# Patient Record
Sex: FEMALE | NOT HISPANIC OR LATINO | Employment: OTHER | URBAN - METROPOLITAN AREA
[De-identification: names, ages, dates, MRNs, and addresses within clinical notes are randomized per-mention and may not be internally consistent; named-entity substitution may affect disease eponyms.]

---

## 2017-01-17 ENCOUNTER — OFFICE VISIT - HEALTHEAST (OUTPATIENT)
Dept: RHEUMATOLOGY | Facility: CLINIC | Age: 62
End: 2017-01-17

## 2017-01-17 DIAGNOSIS — Z79.899 HIGH RISK MEDICATION USE: ICD-10-CM

## 2017-01-17 DIAGNOSIS — M05.79 SEROPOSITIVE RHEUMATOID ARTHRITIS OF MULTIPLE SITES (H): ICD-10-CM

## 2017-04-04 ENCOUNTER — HOSPITAL ENCOUNTER (OUTPATIENT)
Dept: MAMMOGRAPHY | Facility: CLINIC | Age: 62
Discharge: HOME | End: 2017-04-04
Attending: FAMILY MEDICINE

## 2017-04-04 DIAGNOSIS — Z12.31 VISIT FOR SCREENING MAMMOGRAM: ICD-10-CM

## 2017-04-07 ENCOUNTER — COMMUNICATION - HEALTHEAST (OUTPATIENT)
Dept: FAMILY MEDICINE | Facility: CLINIC | Age: 62
End: 2017-04-07

## 2017-04-07 ENCOUNTER — HOSPITAL ENCOUNTER (OUTPATIENT)
Dept: MAMMOGRAPHY | Facility: CLINIC | Age: 62
Discharge: HOME | End: 2017-04-07
Attending: FAMILY MEDICINE

## 2017-04-07 ENCOUNTER — HOSPITAL ENCOUNTER (OUTPATIENT)
Dept: ULTRASOUND IMAGING | Facility: CLINIC | Age: 62
Discharge: HOME | End: 2017-04-07
Attending: FAMILY MEDICINE

## 2017-04-07 DIAGNOSIS — N64.89 BREAST ASYMMETRY: ICD-10-CM

## 2017-07-19 ENCOUNTER — COMMUNICATION - HEALTHEAST (OUTPATIENT)
Dept: ADMINISTRATIVE | Facility: CLINIC | Age: 62
End: 2017-07-19

## 2017-07-19 DIAGNOSIS — M05.79 SEROPOSITIVE RHEUMATOID ARTHRITIS OF MULTIPLE SITES (H): ICD-10-CM

## 2017-07-20 ENCOUNTER — COMMUNICATION - HEALTHEAST (OUTPATIENT)
Dept: LAB | Facility: CLINIC | Age: 62
End: 2017-07-20

## 2017-07-20 ENCOUNTER — COMMUNICATION - HEALTHEAST (OUTPATIENT)
Dept: SCHEDULING | Facility: CLINIC | Age: 62
End: 2017-07-20

## 2017-07-21 ENCOUNTER — AMBULATORY - HEALTHEAST (OUTPATIENT)
Dept: LAB | Facility: CLINIC | Age: 62
End: 2017-07-21

## 2017-07-21 DIAGNOSIS — M05.79 SEROPOSITIVE RHEUMATOID ARTHRITIS OF MULTIPLE SITES (H): ICD-10-CM

## 2017-07-21 LAB
ALT SERPL W P-5'-P-CCNC: 16 U/L (ref 0–45)
CREAT SERPL-MCNC: 0.83 MG/DL (ref 0.6–1.1)
GFR SERPL CREATININE-BSD FRML MDRD: >60 ML/MIN/1.73M2

## 2017-07-26 ENCOUNTER — OFFICE VISIT - HEALTHEAST (OUTPATIENT)
Dept: RHEUMATOLOGY | Facility: CLINIC | Age: 62
End: 2017-07-26

## 2017-07-26 DIAGNOSIS — Z79.899 HIGH RISK MEDICATION USE: ICD-10-CM

## 2017-07-26 DIAGNOSIS — M05.79 SEROPOSITIVE RHEUMATOID ARTHRITIS OF MULTIPLE SITES (H): ICD-10-CM

## 2017-07-26 DIAGNOSIS — R76.8 CYCLIC CITRULLINATED PEPTIDE (CCP) ANTIBODY POSITIVE: ICD-10-CM

## 2017-07-26 ASSESSMENT — MIFFLIN-ST. JEOR: SCORE: 1416.35

## 2017-09-06 ENCOUNTER — COMMUNICATION - HEALTHEAST (OUTPATIENT)
Dept: RHEUMATOLOGY | Facility: CLINIC | Age: 62
End: 2017-09-06

## 2017-09-06 DIAGNOSIS — M05.79 SEROPOSITIVE RHEUMATOID ARTHRITIS OF MULTIPLE SITES (H): ICD-10-CM

## 2017-09-29 ENCOUNTER — RECORDS - HEALTHEAST (OUTPATIENT)
Dept: ADMINISTRATIVE | Facility: OTHER | Age: 62
End: 2017-09-29

## 2017-09-29 ENCOUNTER — TELEPHONE (OUTPATIENT)
Dept: TRANSPLANT | Facility: CLINIC | Age: 62
End: 2017-09-29

## 2017-09-29 NOTE — TELEPHONE ENCOUNTER
"[facility] B[facility]ZE    [id number]     LIVING KIDNEY DONOR EVALUATION  Donor First Name Elza Donor MRN Not in Epic  Donor Last Name Jam Completed 2017 12:02 AM   1955 Record ID [id number]  BREEZE Screen PASSED      Intended Recipient  Recipient First Name Basia Recipient MRN    Recipient Last Name Gillian Relationship Member of the same community  Recipient    Recipient Diagnosis    Recipient's ABO        Donor Information  Age 62 Gender Female  Height 5' 7'' Race Black or African American  Weight 177 Ethnicity Not /  BMI 27.7 Preferred Language English       Required No      Blood Type Unknown  Demographics  Home Address [address] [place] [address] # [phone]  [facility] Type [facility] #    [facility] Code [address] Type    Country [place] Preferred Contact day Mon  Email [email or web address] Preferred Contact time 09:00 AM-11:00 AM  Donor's Medical Information  Medical History Bile Duct Obstruction NOS   History of    History of pregnancy   Joint Pain/Arthritis NOS   Menopause   Perimenopausal   Postmenopausal   Rheumatoid Arthritis   Uterine Fibroids Medications \"flintstones complete children's multivitamin supplement\"   Calcium and Vitamin D   Hydroxychloroquine   Premarin  Surgical History Hysterectomy   Hysterectomy and Oophorectomy   Myomectomy, NOS Allergies Penicillin : Rash  Social History EtOH: Rare (1-2 drinks/month)   Illicit Drug Use: Denies   Tobacco: Remote; Quit ; (1 ppd x 20 years) Self-Reported Functional Status \"I am able to participate in moderate recreational activities like golf, double tennis, dancing, throwing a baseball or football\"  Family Medical History Cancer (Father, Mother)   Diabetes (Sibling, Mother, Aunt or Uncle)   Heart Disease (Sibling)   Hypertension (Father)   Kidney Disease (denies)   Kidney Stones (denies) Exercise Frequency Exercise (3 X per week)  Review of Organ Systems  Review of " Systems Airway or Lungs: No   Blood Disorder: No   Cancer: No   Diabetes,Thyroid,Adrenal,Endocrine Disorder: No   Digestive or Liver: Yes   Female Health: Yes   Heart or Circulatory System: No   Immune Diseases: Yes   Kidneys and Bladder: No   Muscles,Bones,Joints: Yes   Neuro: No   Psych: No  Donor's Social Information  Marital Status  Living Accommodation Owns own home/apartment  Level of Education Graduate or professional degree complete Living Arrangement Alone  Employment Status Full Time Concerns: health and life insurance No  Employer [employer] Concerns: job security and lost income No  Occupation        Medical Insurance Status Has medical insurance      High Risk Behavior  High Risk Behaviors Blood transfusion < 12 months. (NO)   Commercial sex < 12 months. (NO)   Illicit IV drug use < 5yrs. (NO)   Other high risk sexual contact < 12 months. (NO)  Reason for Donation  Referral From Other Donor Reason for Donation I'm a designated donor upon my death. I feel if someone is in need and I can gel, that I should.  Permission to Disclose Inquiry Yes Patient Comments    Donor Motivation Level Ready to start evaluation with reservations      PCP Contact  PCP Name Dr. Resendiz  [facility]  PCP Phone [phone]  Emergency Contact  First Name Yobani First Name Jaylan  Last Name Jam Last Name Jam  Phone # [phone] Phone # [phone]  Relationship Child Relationship Child  Office Use  Reviewed By Danisha  Reviewed 9/26/2017 11:04 AM  Admin Folder Accept  Comments 9/26: Passed eval. Not in Epic. - Danisha  Lost for Followup Not Checked  Extended Comments    BREEZE ID [email or web address]:[id number] survey status completed  Open Activities

## 2017-09-29 NOTE — TELEPHONE ENCOUNTER
"Unknown abo. Interested in PEP. 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  "

## 2017-12-06 ENCOUNTER — COMMUNICATION - HEALTHEAST (OUTPATIENT)
Dept: RHEUMATOLOGY | Facility: CLINIC | Age: 62
End: 2017-12-06

## 2017-12-06 DIAGNOSIS — M05.79 SEROPOSITIVE RHEUMATOID ARTHRITIS OF MULTIPLE SITES (H): ICD-10-CM

## 2018-01-19 ENCOUNTER — COMMUNICATION - HEALTHEAST (OUTPATIENT)
Dept: ADMINISTRATIVE | Facility: CLINIC | Age: 63
End: 2018-01-19

## 2018-01-23 ENCOUNTER — OFFICE VISIT - HEALTHEAST (OUTPATIENT)
Dept: RHEUMATOLOGY | Facility: CLINIC | Age: 63
End: 2018-01-23

## 2018-01-23 DIAGNOSIS — Z79.899 HIGH RISK MEDICATION USE: ICD-10-CM

## 2018-01-23 DIAGNOSIS — M05.79 SEROPOSITIVE RHEUMATOID ARTHRITIS OF MULTIPLE SITES (H): ICD-10-CM

## 2018-01-23 DIAGNOSIS — R76.8 CYCLIC CITRULLINATED PEPTIDE (CCP) ANTIBODY POSITIVE: ICD-10-CM

## 2018-01-23 LAB
ALBUMIN SERPL-MCNC: 3.8 G/DL (ref 3.5–5)
ALT SERPL W P-5'-P-CCNC: 16 U/L (ref 0–45)
CREAT SERPL-MCNC: 0.79 MG/DL (ref 0.6–1.1)
ERYTHROCYTE [DISTWIDTH] IN BLOOD BY AUTOMATED COUNT: 10.5 % (ref 11–14.5)
GFR SERPL CREATININE-BSD FRML MDRD: >60 ML/MIN/1.73M2
HCT VFR BLD AUTO: 36.1 % (ref 35–47)
HGB BLD-MCNC: 12.2 G/DL (ref 12–16)
MCH RBC QN AUTO: 30.3 PG (ref 27–34)
MCHC RBC AUTO-ENTMCNC: 33.9 G/DL (ref 32–36)
MCV RBC AUTO: 89 FL (ref 80–100)
PLATELET # BLD AUTO: 314 THOU/UL (ref 140–440)
PMV BLD AUTO: 7.6 FL (ref 7–10)
RBC # BLD AUTO: 4.04 MILL/UL (ref 3.8–5.4)
WBC: 7 THOU/UL (ref 4–11)

## 2018-01-23 ASSESSMENT — MIFFLIN-ST. JEOR: SCORE: 1416.35

## 2018-03-05 ENCOUNTER — MEDICAL CORRESPONDENCE (OUTPATIENT)
Dept: HEALTH INFORMATION MANAGEMENT | Facility: CLINIC | Age: 63
End: 2018-03-05

## 2018-03-12 DIAGNOSIS — Z00.5 TRANSPLANT DONOR EVALUATION: Primary | ICD-10-CM

## 2018-03-13 ENCOUNTER — AMBULATORY - HEALTHEAST (OUTPATIENT)
Dept: NURSING | Facility: CLINIC | Age: 63
End: 2018-03-13

## 2018-03-13 ENCOUNTER — AMBULATORY - HEALTHEAST (OUTPATIENT)
Dept: LAB | Facility: CLINIC | Age: 63
End: 2018-03-13

## 2018-03-13 DIAGNOSIS — Z00.5 EXAMINATION OF POTENTIAL DONOR OF ORGAN AND TISSUE: ICD-10-CM

## 2018-03-13 LAB
ALBUMIN UR-MCNC: NEGATIVE MG/DL
APPEARANCE UR: CLEAR
BILIRUB UR QL STRIP: NEGATIVE
COLOR UR AUTO: YELLOW
CREAT SERPL-MCNC: 0.79 MG/DL (ref 0.6–1.1)
CREAT UR-MCNC: 240.4 MG/DL
FASTING STATUS PATIENT QL REPORTED: YES
GFR SERPL CREATININE-BSD FRML MDRD: >60 ML/MIN/1.73M2
GLUCOSE BLD-MCNC: 94 MG/DL (ref 70–99)
GLUCOSE UR STRIP-MCNC: NEGATIVE MG/DL
HGB BLD-MCNC: 12.2 G/DL (ref 12–16)
HGB UR QL STRIP: NEGATIVE
KETONES UR STRIP-MCNC: NEGATIVE MG/DL
LEUKOCYTE ESTERASE UR QL STRIP: NEGATIVE
MICROALBUMIN UR-MCNC: 0.89 MG/DL (ref 0–1.99)
MICROALBUMIN/CREAT UR: 3.7 MG/G
NITRATE UR QL: NEGATIVE
PH UR STRIP: 6 [PH] (ref 5–8)
SP GR UR STRIP: 1.02 (ref 1–1.03)
UROBILINOGEN UR STRIP-ACNC: NORMAL

## 2018-03-13 ASSESSMENT — MIFFLIN-ST. JEOR: SCORE: 1400.24

## 2018-03-14 ENCOUNTER — DOCUMENTATION ONLY (OUTPATIENT)
Dept: TRANSPLANT | Facility: CLINIC | Age: 63
End: 2018-03-14

## 2018-03-14 LAB
ABO/RH(D): NORMAL
ABORH REPEAT: NORMAL

## 2018-03-20 ENCOUNTER — TELEPHONE (OUTPATIENT)
Dept: TRANSPLANT | Facility: CLINIC | Age: 63
End: 2018-03-20

## 2018-03-20 NOTE — TELEPHONE ENCOUNTER
Informed Elza her Phase 1's are OK. Average GFR=92Hesitant about PEP. Will now send her swabs to PROCESS.Req they be sent to work address:[address] [facility],[id number].

## 2018-04-17 ENCOUNTER — COMMUNICATION - HEALTHEAST (OUTPATIENT)
Dept: RHEUMATOLOGY | Facility: CLINIC | Age: 63
End: 2018-04-17

## 2018-04-17 DIAGNOSIS — M05.79 SEROPOSITIVE RHEUMATOID ARTHRITIS OF MULTIPLE SITES (H): ICD-10-CM

## 2018-06-29 ENCOUNTER — OFFICE VISIT - HEALTHEAST (OUTPATIENT)
Dept: FAMILY MEDICINE | Facility: CLINIC | Age: 63
End: 2018-06-29

## 2018-06-29 DIAGNOSIS — M05.79 SEROPOSITIVE RHEUMATOID ARTHRITIS OF MULTIPLE SITES (H): ICD-10-CM

## 2018-06-29 DIAGNOSIS — Z90.710 S/P TOTAL HYSTERECTOMY: ICD-10-CM

## 2018-06-29 DIAGNOSIS — Z00.00 ROUTINE GENERAL MEDICAL EXAMINATION AT A HEALTH CARE FACILITY: ICD-10-CM

## 2018-06-29 DIAGNOSIS — Z78.0 MENOPAUSE: ICD-10-CM

## 2018-06-29 DIAGNOSIS — Z12.31 VISIT FOR SCREENING MAMMOGRAM: ICD-10-CM

## 2018-06-29 DIAGNOSIS — Z12.11 SCREEN FOR COLON CANCER: ICD-10-CM

## 2018-06-29 LAB
ALBUMIN SERPL-MCNC: 3.9 G/DL (ref 3.5–5)
ALP SERPL-CCNC: 51 U/L (ref 45–120)
ALT SERPL W P-5'-P-CCNC: 23 U/L (ref 0–45)
ANION GAP SERPL CALCULATED.3IONS-SCNC: 9 MMOL/L (ref 5–18)
AST SERPL W P-5'-P-CCNC: 32 U/L (ref 0–40)
BASOPHILS # BLD AUTO: 0 THOU/UL (ref 0–0.2)
BASOPHILS NFR BLD AUTO: 1 % (ref 0–2)
BILIRUB SERPL-MCNC: 0.5 MG/DL (ref 0–1)
BUN SERPL-MCNC: 12 MG/DL (ref 8–22)
CALCIUM SERPL-MCNC: 9.7 MG/DL (ref 8.5–10.5)
CHLORIDE BLD-SCNC: 107 MMOL/L (ref 98–107)
CHOLEST SERPL-MCNC: 195 MG/DL
CO2 SERPL-SCNC: 26 MMOL/L (ref 22–31)
CREAT SERPL-MCNC: 0.76 MG/DL (ref 0.6–1.1)
EOSINOPHIL # BLD AUTO: 0.2 THOU/UL (ref 0–0.4)
EOSINOPHIL NFR BLD AUTO: 3 % (ref 0–6)
ERYTHROCYTE [DISTWIDTH] IN BLOOD BY AUTOMATED COUNT: 10.8 % (ref 11–14.5)
FASTING STATUS PATIENT QL REPORTED: YES
GFR SERPL CREATININE-BSD FRML MDRD: >60 ML/MIN/1.73M2
GLUCOSE BLD-MCNC: 88 MG/DL (ref 70–125)
HCT VFR BLD AUTO: 38 % (ref 35–47)
HDLC SERPL-MCNC: 78 MG/DL
HGB BLD-MCNC: 12.7 G/DL (ref 12–16)
LDLC SERPL CALC-MCNC: 103 MG/DL
LYMPHOCYTES # BLD AUTO: 1.9 THOU/UL (ref 0.8–4.4)
LYMPHOCYTES NFR BLD AUTO: 32 % (ref 20–40)
MCH RBC QN AUTO: 30.4 PG (ref 27–34)
MCHC RBC AUTO-ENTMCNC: 33.3 G/DL (ref 32–36)
MCV RBC AUTO: 91 FL (ref 80–100)
MONOCYTES # BLD AUTO: 0.3 THOU/UL (ref 0–0.9)
MONOCYTES NFR BLD AUTO: 5 % (ref 2–10)
NEUTROPHILS # BLD AUTO: 3.6 THOU/UL (ref 2–7.7)
NEUTROPHILS NFR BLD AUTO: 60 % (ref 50–70)
PLATELET # BLD AUTO: 269 THOU/UL (ref 140–440)
PMV BLD AUTO: 9 FL (ref 7–10)
POTASSIUM BLD-SCNC: 5.1 MMOL/L (ref 3.5–5)
PROT SERPL-MCNC: 6.7 G/DL (ref 6–8)
RBC # BLD AUTO: 4.16 MILL/UL (ref 3.8–5.4)
SODIUM SERPL-SCNC: 142 MMOL/L (ref 136–145)
TRIGL SERPL-MCNC: 72 MG/DL
WBC: 5.9 THOU/UL (ref 4–11)

## 2018-06-29 ASSESSMENT — MIFFLIN-ST. JEOR: SCORE: 1424.51

## 2018-07-02 LAB
25(OH)D3 SERPL-MCNC: 33.4 NG/ML (ref 30–80)
25(OH)D3 SERPL-MCNC: 33.4 NG/ML (ref 30–80)

## 2018-07-03 ENCOUNTER — HOSPITAL ENCOUNTER (OUTPATIENT)
Dept: MAMMOGRAPHY | Facility: CLINIC | Age: 63
Discharge: HOME | End: 2018-07-03
Attending: FAMILY MEDICINE

## 2018-07-03 DIAGNOSIS — Z12.31 VISIT FOR SCREENING MAMMOGRAM: ICD-10-CM

## 2018-07-17 ENCOUNTER — COMMUNICATION - HEALTHEAST (OUTPATIENT)
Dept: RHEUMATOLOGY | Facility: CLINIC | Age: 63
End: 2018-07-17

## 2018-07-17 DIAGNOSIS — M05.79 SEROPOSITIVE RHEUMATOID ARTHRITIS OF MULTIPLE SITES (H): ICD-10-CM

## 2018-08-13 ENCOUNTER — COMMUNICATION - HEALTHEAST (OUTPATIENT)
Dept: SCHEDULING | Facility: CLINIC | Age: 63
End: 2018-08-13

## 2018-08-14 ENCOUNTER — COMMUNICATION - HEALTHEAST (OUTPATIENT)
Dept: FAMILY MEDICINE | Facility: CLINIC | Age: 63
End: 2018-08-14

## 2018-08-22 ENCOUNTER — COMMUNICATION - HEALTHEAST (OUTPATIENT)
Dept: FAMILY MEDICINE | Facility: CLINIC | Age: 63
End: 2018-08-22

## 2018-08-24 ENCOUNTER — COMMUNICATION - HEALTHEAST (OUTPATIENT)
Dept: SCHEDULING | Facility: CLINIC | Age: 63
End: 2018-08-24

## 2018-08-24 ENCOUNTER — COMMUNICATION - HEALTHEAST (OUTPATIENT)
Dept: LAB | Facility: CLINIC | Age: 63
End: 2018-08-24

## 2018-08-27 ENCOUNTER — AMBULATORY - HEALTHEAST (OUTPATIENT)
Dept: LAB | Facility: CLINIC | Age: 63
End: 2018-08-27

## 2018-08-27 ENCOUNTER — AMBULATORY - HEALTHEAST (OUTPATIENT)
Dept: RHEUMATOLOGY | Facility: CLINIC | Age: 63
End: 2018-08-27

## 2018-08-27 DIAGNOSIS — M05.79 SEROPOSITIVE RHEUMATOID ARTHRITIS OF MULTIPLE SITES (H): ICD-10-CM

## 2018-08-27 LAB
ALBUMIN SERPL-MCNC: 3.9 G/DL (ref 3.5–5)
ALT SERPL W P-5'-P-CCNC: 20 U/L (ref 0–45)
CREAT SERPL-MCNC: 0.77 MG/DL (ref 0.6–1.1)
ERYTHROCYTE [DISTWIDTH] IN BLOOD BY AUTOMATED COUNT: 10.9 % (ref 11–14.5)
GFR SERPL CREATININE-BSD FRML MDRD: >60 ML/MIN/1.73M2
HCT VFR BLD AUTO: 35 % (ref 35–47)
HGB BLD-MCNC: 11.9 G/DL (ref 12–16)
MCH RBC QN AUTO: 30.4 PG (ref 27–34)
MCHC RBC AUTO-ENTMCNC: 34 G/DL (ref 32–36)
MCV RBC AUTO: 89 FL (ref 80–100)
PLATELET # BLD AUTO: 268 THOU/UL (ref 140–440)
PMV BLD AUTO: 7.8 FL (ref 7–10)
RBC # BLD AUTO: 3.92 MILL/UL (ref 3.8–5.4)
WBC: 5.6 THOU/UL (ref 4–11)

## 2018-08-28 ENCOUNTER — OFFICE VISIT - HEALTHEAST (OUTPATIENT)
Dept: RHEUMATOLOGY | Facility: CLINIC | Age: 63
End: 2018-08-28

## 2018-08-28 DIAGNOSIS — M05.79 SEROPOSITIVE RHEUMATOID ARTHRITIS OF MULTIPLE SITES (H): ICD-10-CM

## 2018-08-28 DIAGNOSIS — R76.8 CYCLIC CITRULLINATED PEPTIDE (CCP) ANTIBODY POSITIVE: ICD-10-CM

## 2018-08-28 DIAGNOSIS — M70.62 TROCHANTERIC BURSITIS, LEFT HIP: ICD-10-CM

## 2018-08-28 DIAGNOSIS — Z79.899 HIGH RISK MEDICATION USE: ICD-10-CM

## 2018-09-17 ENCOUNTER — OFFICE VISIT - HEALTHEAST (OUTPATIENT)
Dept: FAMILY MEDICINE | Facility: CLINIC | Age: 63
End: 2018-09-17

## 2018-09-17 DIAGNOSIS — M62.830 LUMBAR PARASPINAL MUSCLE SPASM: ICD-10-CM

## 2018-11-21 ENCOUNTER — RECORDS - HEALTHEAST (OUTPATIENT)
Dept: ADMINISTRATIVE | Facility: OTHER | Age: 63
End: 2018-11-21

## 2019-02-18 ENCOUNTER — AMBULATORY - HEALTHEAST (OUTPATIENT)
Dept: NURSING | Facility: CLINIC | Age: 64
End: 2019-02-18

## 2019-02-27 ENCOUNTER — OFFICE VISIT - HEALTHEAST (OUTPATIENT)
Dept: RHEUMATOLOGY | Facility: CLINIC | Age: 64
End: 2019-02-27

## 2019-02-27 DIAGNOSIS — R76.8 CYCLIC CITRULLINATED PEPTIDE (CCP) ANTIBODY POSITIVE: ICD-10-CM

## 2019-02-27 DIAGNOSIS — Z79.899 HIGH RISK MEDICATION USE: ICD-10-CM

## 2019-02-27 DIAGNOSIS — M05.79 SEROPOSITIVE RHEUMATOID ARTHRITIS OF MULTIPLE SITES (H): ICD-10-CM

## 2019-03-28 ENCOUNTER — COMMUNICATION - HEALTHEAST (OUTPATIENT)
Dept: RHEUMATOLOGY | Facility: CLINIC | Age: 64
End: 2019-03-28

## 2019-03-28 DIAGNOSIS — M05.79 SEROPOSITIVE RHEUMATOID ARTHRITIS OF MULTIPLE SITES (H): ICD-10-CM

## 2019-07-07 ENCOUNTER — COMMUNICATION - HEALTHEAST (OUTPATIENT)
Dept: RHEUMATOLOGY | Facility: CLINIC | Age: 64
End: 2019-07-07

## 2019-07-07 DIAGNOSIS — M05.79 SEROPOSITIVE RHEUMATOID ARTHRITIS OF MULTIPLE SITES (H): ICD-10-CM

## 2019-08-13 ENCOUNTER — COMMUNICATION - HEALTHEAST (OUTPATIENT)
Dept: FAMILY MEDICINE | Facility: CLINIC | Age: 64
End: 2019-08-13

## 2019-08-13 DIAGNOSIS — Z78.0 MENOPAUSE: ICD-10-CM

## 2019-08-27 ENCOUNTER — AMBULATORY - HEALTHEAST (OUTPATIENT)
Dept: LAB | Facility: CLINIC | Age: 64
End: 2019-08-27

## 2019-08-27 DIAGNOSIS — M05.79 SEROPOSITIVE RHEUMATOID ARTHRITIS OF MULTIPLE SITES (H): ICD-10-CM

## 2019-08-27 LAB
ALBUMIN SERPL-MCNC: 3.7 G/DL (ref 3.5–5)
ALT SERPL W P-5'-P-CCNC: 15 U/L (ref 0–45)
CREAT SERPL-MCNC: 0.75 MG/DL (ref 0.6–1.1)
ERYTHROCYTE [DISTWIDTH] IN BLOOD BY AUTOMATED COUNT: 11.1 % (ref 11–14.5)
GFR SERPL CREATININE-BSD FRML MDRD: >60 ML/MIN/1.73M2
HCT VFR BLD AUTO: 36.9 % (ref 35–47)
HGB BLD-MCNC: 12.3 G/DL (ref 12–16)
MCH RBC QN AUTO: 30.4 PG (ref 27–34)
MCHC RBC AUTO-ENTMCNC: 33.4 G/DL (ref 32–36)
MCV RBC AUTO: 91 FL (ref 80–100)
PLATELET # BLD AUTO: 291 THOU/UL (ref 140–440)
PMV BLD AUTO: 8.2 FL (ref 7–10)
RBC # BLD AUTO: 4.06 MILL/UL (ref 3.8–5.4)
WBC: 8.1 THOU/UL (ref 4–11)

## 2019-08-30 ENCOUNTER — OFFICE VISIT - HEALTHEAST (OUTPATIENT)
Dept: FAMILY MEDICINE | Facility: CLINIC | Age: 64
End: 2019-08-30

## 2019-08-30 DIAGNOSIS — Z11.4 ENCOUNTER FOR SCREENING FOR HIV: ICD-10-CM

## 2019-08-30 DIAGNOSIS — E55.9 VITAMIN D DEFICIENCY: ICD-10-CM

## 2019-08-30 DIAGNOSIS — Z00.00 ROUTINE GENERAL MEDICAL EXAMINATION AT A HEALTH CARE FACILITY: ICD-10-CM

## 2019-08-30 LAB
CHOLEST SERPL-MCNC: 203 MG/DL
FASTING STATUS PATIENT QL REPORTED: NO
HDLC SERPL-MCNC: 85 MG/DL
HIV 1+2 AB+HIV1 P24 AG SERPL QL IA: NEGATIVE
LDLC SERPL CALC-MCNC: 87 MG/DL
TRIGL SERPL-MCNC: 157 MG/DL

## 2019-08-30 ASSESSMENT — MIFFLIN-ST. JEOR: SCORE: 1456.37

## 2019-09-03 LAB
25(OH)D3 SERPL-MCNC: 28.7 NG/ML (ref 30–80)
25(OH)D3 SERPL-MCNC: 28.7 NG/ML (ref 30–80)

## 2019-09-04 ENCOUNTER — OFFICE VISIT - HEALTHEAST (OUTPATIENT)
Dept: RHEUMATOLOGY | Facility: CLINIC | Age: 64
End: 2019-09-04

## 2019-09-04 DIAGNOSIS — R76.8 CYCLIC CITRULLINATED PEPTIDE (CCP) ANTIBODY POSITIVE: ICD-10-CM

## 2019-09-04 DIAGNOSIS — M05.79 SEROPOSITIVE RHEUMATOID ARTHRITIS OF MULTIPLE SITES (H): ICD-10-CM

## 2019-09-04 DIAGNOSIS — M70.62 TROCHANTERIC BURSITIS, LEFT HIP: ICD-10-CM

## 2019-09-04 DIAGNOSIS — Z79.899 HIGH RISK MEDICATION USE: ICD-10-CM

## 2019-10-26 ENCOUNTER — COMMUNICATION - HEALTHEAST (OUTPATIENT)
Dept: RHEUMATOLOGY | Facility: CLINIC | Age: 64
End: 2019-10-26

## 2019-10-26 DIAGNOSIS — M05.79 SEROPOSITIVE RHEUMATOID ARTHRITIS OF MULTIPLE SITES (H): ICD-10-CM

## 2019-11-05 ENCOUNTER — COMMUNICATION - HEALTHEAST (OUTPATIENT)
Dept: RHEUMATOLOGY | Facility: CLINIC | Age: 64
End: 2019-11-05

## 2019-11-05 DIAGNOSIS — M05.79 SEROPOSITIVE RHEUMATOID ARTHRITIS OF MULTIPLE SITES (H): ICD-10-CM

## 2019-11-15 ENCOUNTER — AMBULATORY - HEALTHEAST (OUTPATIENT)
Dept: NURSING | Facility: CLINIC | Age: 64
End: 2019-11-15

## 2020-02-03 ENCOUNTER — RECORDS - HEALTHEAST (OUTPATIENT)
Dept: ADMINISTRATIVE | Facility: OTHER | Age: 65
End: 2020-02-03

## 2020-02-08 ENCOUNTER — COMMUNICATION - HEALTHEAST (OUTPATIENT)
Dept: RHEUMATOLOGY | Facility: CLINIC | Age: 65
End: 2020-02-08

## 2020-02-08 DIAGNOSIS — M05.79 SEROPOSITIVE RHEUMATOID ARTHRITIS OF MULTIPLE SITES (H): ICD-10-CM

## 2020-03-04 ENCOUNTER — OFFICE VISIT - HEALTHEAST (OUTPATIENT)
Dept: RHEUMATOLOGY | Facility: CLINIC | Age: 65
End: 2020-03-04

## 2020-03-04 DIAGNOSIS — M05.79 SEROPOSITIVE RHEUMATOID ARTHRITIS OF MULTIPLE SITES (H): ICD-10-CM

## 2020-03-04 DIAGNOSIS — Z79.899 HIGH RISK MEDICATION USE: ICD-10-CM

## 2020-03-04 DIAGNOSIS — R76.8 CYCLIC CITRULLINATED PEPTIDE (CCP) ANTIBODY POSITIVE: ICD-10-CM

## 2020-03-04 LAB
ALBUMIN SERPL-MCNC: 4 G/DL (ref 3.5–5)
ALT SERPL W P-5'-P-CCNC: 20 U/L (ref 0–45)
CREAT SERPL-MCNC: 0.72 MG/DL (ref 0.6–1.1)
ERYTHROCYTE [DISTWIDTH] IN BLOOD BY AUTOMATED COUNT: 10.5 % (ref 11–14.5)
GFR SERPL CREATININE-BSD FRML MDRD: >60 ML/MIN/1.73M2
HCT VFR BLD AUTO: 37.2 % (ref 35–47)
HGB BLD-MCNC: 12.7 G/DL (ref 12–16)
MCH RBC QN AUTO: 31.1 PG (ref 27–34)
MCHC RBC AUTO-ENTMCNC: 34.2 G/DL (ref 32–36)
MCV RBC AUTO: 91 FL (ref 80–100)
PLATELET # BLD AUTO: 263 THOU/UL (ref 140–440)
PMV BLD AUTO: 8.7 FL (ref 7–10)
RBC # BLD AUTO: 4.09 MILL/UL (ref 3.8–5.4)
WBC: 7.5 THOU/UL (ref 4–11)

## 2020-05-11 ENCOUNTER — COMMUNICATION - HEALTHEAST (OUTPATIENT)
Dept: RHEUMATOLOGY | Facility: CLINIC | Age: 65
End: 2020-05-11

## 2020-05-11 DIAGNOSIS — M05.79 SEROPOSITIVE RHEUMATOID ARTHRITIS OF MULTIPLE SITES (H): ICD-10-CM

## 2020-08-02 ENCOUNTER — COMMUNICATION - HEALTHEAST (OUTPATIENT)
Dept: FAMILY MEDICINE | Facility: CLINIC | Age: 65
End: 2020-08-02

## 2020-08-02 DIAGNOSIS — Z78.0 MENOPAUSE: ICD-10-CM

## 2020-08-19 ENCOUNTER — HOSPITAL ENCOUNTER (OUTPATIENT)
Dept: MAMMOGRAPHY | Facility: CLINIC | Age: 65
Discharge: HOME | End: 2020-08-19
Attending: FAMILY MEDICINE

## 2020-08-19 DIAGNOSIS — Z12.31 VISIT FOR SCREENING MAMMOGRAM: ICD-10-CM

## 2020-08-27 ENCOUNTER — COMMUNICATION - HEALTHEAST (OUTPATIENT)
Dept: RHEUMATOLOGY | Facility: CLINIC | Age: 65
End: 2020-08-27

## 2020-08-27 DIAGNOSIS — M05.79 SEROPOSITIVE RHEUMATOID ARTHRITIS OF MULTIPLE SITES (H): ICD-10-CM

## 2020-08-28 ENCOUNTER — AMBULATORY - HEALTHEAST (OUTPATIENT)
Dept: MAMMOGRAPHY | Facility: CLINIC | Age: 65
End: 2020-08-28

## 2020-08-28 ENCOUNTER — HOSPITAL ENCOUNTER (OUTPATIENT)
Dept: MAMMOGRAPHY | Facility: CLINIC | Age: 65
Discharge: HOME | End: 2020-08-28
Attending: FAMILY MEDICINE

## 2020-08-28 DIAGNOSIS — Z11.59 ENCOUNTER FOR SCREENING FOR OTHER VIRAL DISEASES: ICD-10-CM

## 2020-08-28 DIAGNOSIS — N64.89 BREAST ASYMMETRY: ICD-10-CM

## 2020-08-29 ENCOUNTER — AMBULATORY - HEALTHEAST (OUTPATIENT)
Dept: FAMILY MEDICINE | Facility: CLINIC | Age: 65
End: 2020-08-29

## 2020-08-29 DIAGNOSIS — Z11.59 ENCOUNTER FOR SCREENING FOR OTHER VIRAL DISEASES: ICD-10-CM

## 2020-08-31 ENCOUNTER — OFFICE VISIT - HEALTHEAST (OUTPATIENT)
Dept: FAMILY MEDICINE | Facility: CLINIC | Age: 65
End: 2020-08-31

## 2020-08-31 DIAGNOSIS — Z23 IMMUNIZATION DUE: ICD-10-CM

## 2020-08-31 DIAGNOSIS — Z00.00 ROUTINE GENERAL MEDICAL EXAMINATION AT A HEALTH CARE FACILITY: ICD-10-CM

## 2020-08-31 DIAGNOSIS — B36.0 TINEA VERSICOLOR: ICD-10-CM

## 2020-08-31 LAB
ALBUMIN SERPL-MCNC: 3.9 G/DL (ref 3.5–5)
ALP SERPL-CCNC: 58 U/L (ref 45–120)
ALT SERPL W P-5'-P-CCNC: 19 U/L (ref 0–45)
ANION GAP SERPL CALCULATED.3IONS-SCNC: 10 MMOL/L (ref 5–18)
AST SERPL W P-5'-P-CCNC: 23 U/L (ref 0–40)
BASOPHILS # BLD AUTO: 0 THOU/UL (ref 0–0.2)
BASOPHILS NFR BLD AUTO: 1 % (ref 0–2)
BILIRUB SERPL-MCNC: 0.4 MG/DL (ref 0–1)
BUN SERPL-MCNC: 11 MG/DL (ref 8–22)
CALCIUM SERPL-MCNC: 9.6 MG/DL (ref 8.5–10.5)
CHLORIDE BLD-SCNC: 105 MMOL/L (ref 98–107)
CHOLEST SERPL-MCNC: 199 MG/DL
CO2 SERPL-SCNC: 25 MMOL/L (ref 22–31)
CREAT SERPL-MCNC: 0.8 MG/DL (ref 0.6–1.1)
EOSINOPHIL # BLD AUTO: 0.2 THOU/UL (ref 0–0.4)
EOSINOPHIL NFR BLD AUTO: 3 % (ref 0–6)
ERYTHROCYTE [DISTWIDTH] IN BLOOD BY AUTOMATED COUNT: 10.5 % (ref 11–14.5)
FASTING STATUS PATIENT QL REPORTED: NO
GFR SERPL CREATININE-BSD FRML MDRD: >60 ML/MIN/1.73M2
GLUCOSE BLD-MCNC: 86 MG/DL (ref 70–125)
HCT VFR BLD AUTO: 38.1 % (ref 35–47)
HDLC SERPL-MCNC: 82 MG/DL
HGB BLD-MCNC: 12.6 G/DL (ref 12–16)
LDLC SERPL CALC-MCNC: 98 MG/DL
LYMPHOCYTES # BLD AUTO: 2 THOU/UL (ref 0.8–4.4)
LYMPHOCYTES NFR BLD AUTO: 34 % (ref 20–40)
MCH RBC QN AUTO: 30.1 PG (ref 27–34)
MCHC RBC AUTO-ENTMCNC: 32.9 G/DL (ref 32–36)
MCV RBC AUTO: 91 FL (ref 80–100)
MONOCYTES # BLD AUTO: 0.4 THOU/UL (ref 0–0.9)
MONOCYTES NFR BLD AUTO: 7 % (ref 2–10)
NEUTROPHILS # BLD AUTO: 3.3 THOU/UL (ref 2–7.7)
NEUTROPHILS NFR BLD AUTO: 56 % (ref 50–70)
PLATELET # BLD AUTO: 293 THOU/UL (ref 140–440)
PMV BLD AUTO: 9 FL (ref 7–10)
POTASSIUM BLD-SCNC: 4.6 MMOL/L (ref 3.5–5)
PROT SERPL-MCNC: 6.5 G/DL (ref 6–8)
RBC # BLD AUTO: 4.17 MILL/UL (ref 3.8–5.4)
SODIUM SERPL-SCNC: 140 MMOL/L (ref 136–145)
TRIGL SERPL-MCNC: 93 MG/DL
TSH SERPL DL<=0.005 MIU/L-ACNC: 1.32 UIU/ML (ref 0.3–5)
WBC: 6 THOU/UL (ref 4–11)

## 2020-08-31 ASSESSMENT — MIFFLIN-ST. JEOR: SCORE: 1497.65

## 2020-09-01 LAB
25(OH)D3 SERPL-MCNC: 30.2 NG/ML (ref 30–80)
25(OH)D3 SERPL-MCNC: 30.2 NG/ML (ref 30–80)
HPV SOURCE: NORMAL
HUMAN PAPILLOMA VIRUS 16 DNA: NEGATIVE
HUMAN PAPILLOMA VIRUS 18 DNA: NEGATIVE
HUMAN PAPILLOMA VIRUS FINAL DIAGNOSIS: NORMAL
HUMAN PAPILLOMA VIRUS OTHER HR: NEGATIVE
SPECIMEN DESCRIPTION: NORMAL

## 2020-09-02 ENCOUNTER — COMMUNICATION - HEALTHEAST (OUTPATIENT)
Dept: SCHEDULING | Facility: CLINIC | Age: 65
End: 2020-09-02

## 2020-09-03 ENCOUNTER — HOSPITAL ENCOUNTER (OUTPATIENT)
Dept: MAMMOGRAPHY | Facility: CLINIC | Age: 65
Discharge: HOME | End: 2020-09-03
Attending: FAMILY MEDICINE

## 2020-09-03 ENCOUNTER — AMBULATORY - HEALTHEAST (OUTPATIENT)
Dept: SURGERY | Facility: CLINIC | Age: 65
End: 2020-09-03

## 2020-09-03 DIAGNOSIS — N63.20 LEFT BREAST MASS: ICD-10-CM

## 2020-09-03 DIAGNOSIS — R22.32 AXILLARY MASS, LEFT: ICD-10-CM

## 2020-09-03 DIAGNOSIS — N64.89 BREAST ASYMMETRY: ICD-10-CM

## 2020-09-04 ENCOUNTER — COMMUNICATION - HEALTHEAST (OUTPATIENT)
Dept: ONCOLOGY | Facility: HOSPITAL | Age: 65
End: 2020-09-04

## 2020-09-04 LAB
CAP COMMENT: NORMAL
LAB AP CHARGES (HE HISTORICAL CONVERSION): NORMAL
LAB AP INITIAL CYTO EVAL (HE HISTORICAL CONVERSION): NORMAL
LAB MED GENERAL PATH INTERP (HE HISTORICAL CONVERSION): NORMAL
PATH REPORT.COMMENTS IMP SPEC: NORMAL
PATH REPORT.COMMENTS IMP SPEC: NORMAL
PATH REPORT.FINAL DX SPEC: NORMAL
PATH REPORT.MICROSCOPIC SPEC OTHER STN: NORMAL
PATH REPORT.RELEVANT HX SPEC: NORMAL
SPECIMEN DESCRIPTION: NORMAL

## 2020-09-08 ENCOUNTER — COMMUNICATION - HEALTHEAST (OUTPATIENT)
Dept: FAMILY MEDICINE | Facility: CLINIC | Age: 65
End: 2020-09-08

## 2020-09-08 ENCOUNTER — OFFICE VISIT - HEALTHEAST (OUTPATIENT)
Dept: RHEUMATOLOGY | Facility: CLINIC | Age: 65
End: 2020-09-08

## 2020-09-08 ENCOUNTER — COMMUNICATION - HEALTHEAST (OUTPATIENT)
Dept: ONCOLOGY | Facility: HOSPITAL | Age: 65
End: 2020-09-08

## 2020-09-08 DIAGNOSIS — Z79.899 HIGH RISK MEDICATION USE: ICD-10-CM

## 2020-09-08 DIAGNOSIS — R76.8 CYCLIC CITRULLINATED PEPTIDE (CCP) ANTIBODY POSITIVE: ICD-10-CM

## 2020-09-08 DIAGNOSIS — M05.79 SEROPOSITIVE RHEUMATOID ARTHRITIS OF MULTIPLE SITES (H): ICD-10-CM

## 2020-09-09 LAB
BKR LAB AP ABNORMAL BLEEDING: NO
BKR LAB AP BIRTH CONTROL/HORMONES: NORMAL
BKR LAB AP CERVICAL APPEARANCE: NORMAL
BKR LAB AP GYN ADEQUACY: NORMAL
BKR LAB AP GYN INTERPRETATION: NORMAL
BKR LAB AP HPV REFLEX: NORMAL
BKR LAB AP LMP: 2007
BKR LAB AP PATIENT STATUS: NORMAL
BKR LAB AP PREVIOUS ABNORMAL: NORMAL
BKR LAB AP PREVIOUS NORMAL: NORMAL
HIGH RISK?: NO
PATH REPORT.COMMENTS IMP SPEC: NORMAL
RESULT FLAG (HE HISTORICAL CONVERSION): NORMAL

## 2020-09-11 ENCOUNTER — HOSPITAL ENCOUNTER (OUTPATIENT)
Dept: SURGERY | Facility: CLINIC | Age: 65
Discharge: HOME | End: 2020-09-11
Attending: SPECIALIST

## 2020-09-11 DIAGNOSIS — C50.112 MALIGNANT NEOPLASM OF CENTRAL PORTION OF LEFT BREAST IN FEMALE, ESTROGEN RECEPTOR POSITIVE (H): ICD-10-CM

## 2020-09-11 DIAGNOSIS — Z17.0 MALIGNANT NEOPLASM OF CENTRAL PORTION OF LEFT BREAST IN FEMALE, ESTROGEN RECEPTOR POSITIVE (H): ICD-10-CM

## 2020-09-11 ASSESSMENT — MIFFLIN-ST. JEOR: SCORE: 1493.12

## 2020-09-15 ENCOUNTER — COMMUNICATION - HEALTHEAST (OUTPATIENT)
Dept: SURGERY | Facility: CLINIC | Age: 65
End: 2020-09-15

## 2020-09-16 ENCOUNTER — COMMUNICATION - HEALTHEAST (OUTPATIENT)
Dept: ONCOLOGY | Facility: HOSPITAL | Age: 65
End: 2020-09-16

## 2020-09-16 ENCOUNTER — AMBULATORY - HEALTHEAST (OUTPATIENT)
Dept: SURGERY | Facility: AMBULATORY SURGERY CENTER | Age: 65
End: 2020-09-16

## 2020-09-16 DIAGNOSIS — Z11.59 ENCOUNTER FOR SCREENING FOR OTHER VIRAL DISEASES: ICD-10-CM

## 2020-09-20 ENCOUNTER — AMBULATORY - HEALTHEAST (OUTPATIENT)
Dept: FAMILY MEDICINE | Facility: CLINIC | Age: 65
End: 2020-09-20

## 2020-09-20 DIAGNOSIS — Z11.59 ENCOUNTER FOR SCREENING FOR OTHER VIRAL DISEASES: ICD-10-CM

## 2020-09-22 ENCOUNTER — COMMUNICATION - HEALTHEAST (OUTPATIENT)
Dept: SCHEDULING | Facility: CLINIC | Age: 65
End: 2020-09-22

## 2020-09-22 ENCOUNTER — ANESTHESIA - HEALTHEAST (OUTPATIENT)
Dept: SURGERY | Facility: AMBULATORY SURGERY CENTER | Age: 65
End: 2020-09-22

## 2020-09-22 ASSESSMENT — MIFFLIN-ST. JEOR: SCORE: 1493.12

## 2020-09-23 ENCOUNTER — HOSPITAL ENCOUNTER (OUTPATIENT)
Dept: MAMMOGRAPHY | Facility: CLINIC | Age: 65
Discharge: HOME | End: 2020-09-23
Attending: SPECIALIST

## 2020-09-23 ENCOUNTER — HOSPITAL ENCOUNTER (OUTPATIENT)
Dept: NUCLEAR MEDICINE | Facility: HOSPITAL | Age: 65
Discharge: HOME | End: 2020-09-23
Attending: SPECIALIST

## 2020-09-23 ENCOUNTER — SURGERY - HEALTHEAST (OUTPATIENT)
Dept: SURGERY | Facility: AMBULATORY SURGERY CENTER | Age: 65
End: 2020-09-23

## 2020-09-23 DIAGNOSIS — C50.112 MALIGNANT NEOPLASM OF CENTRAL PORTION OF LEFT BREAST IN FEMALE, ESTROGEN RECEPTOR POSITIVE (H): ICD-10-CM

## 2020-09-23 DIAGNOSIS — Z17.0 MALIGNANT NEOPLASM OF CENTRAL PORTION OF LEFT BREAST IN FEMALE, ESTROGEN RECEPTOR POSITIVE (H): ICD-10-CM

## 2020-09-23 ASSESSMENT — MIFFLIN-ST. JEOR: SCORE: 1493.12

## 2020-09-28 ENCOUNTER — AMBULATORY - HEALTHEAST (OUTPATIENT)
Dept: SURGERY | Facility: CLINIC | Age: 65
End: 2020-09-28

## 2020-09-29 ENCOUNTER — HOSPITAL ENCOUNTER (OUTPATIENT)
Dept: SURGERY | Facility: CLINIC | Age: 65
Discharge: HOME | End: 2020-09-29
Attending: SPECIALIST

## 2020-09-29 DIAGNOSIS — Z17.0 MALIGNANT NEOPLASM OF CENTRAL PORTION OF LEFT BREAST IN FEMALE, ESTROGEN RECEPTOR POSITIVE (H): ICD-10-CM

## 2020-09-29 DIAGNOSIS — C50.112 MALIGNANT NEOPLASM OF CENTRAL PORTION OF LEFT BREAST IN FEMALE, ESTROGEN RECEPTOR POSITIVE (H): ICD-10-CM

## 2020-09-30 ENCOUNTER — AMBULATORY - HEALTHEAST (OUTPATIENT)
Dept: SURGERY | Facility: AMBULATORY SURGERY CENTER | Age: 65
End: 2020-09-30

## 2020-09-30 DIAGNOSIS — Z11.59 ENCOUNTER FOR SCREENING FOR OTHER VIRAL DISEASES: ICD-10-CM

## 2020-10-02 ENCOUNTER — COMMUNICATION - HEALTHEAST (OUTPATIENT)
Dept: SURGERY | Facility: CLINIC | Age: 65
End: 2020-10-02

## 2020-10-05 ENCOUNTER — COMMUNICATION - HEALTHEAST (OUTPATIENT)
Dept: SURGERY | Facility: CLINIC | Age: 65
End: 2020-10-05

## 2020-10-05 ENCOUNTER — RECORDS - HEALTHEAST (OUTPATIENT)
Dept: ADMINISTRATIVE | Facility: OTHER | Age: 65
End: 2020-10-05

## 2020-10-07 ENCOUNTER — RECORDS - HEALTHEAST (OUTPATIENT)
Dept: ADMINISTRATIVE | Facility: OTHER | Age: 65
End: 2020-10-07

## 2020-10-13 ENCOUNTER — COMMUNICATION - HEALTHEAST (OUTPATIENT)
Dept: SURGERY | Facility: CLINIC | Age: 65
End: 2020-10-13

## 2020-10-15 ENCOUNTER — COMMUNICATION - HEALTHEAST (OUTPATIENT)
Dept: FAMILY MEDICINE | Facility: CLINIC | Age: 65
End: 2020-10-15

## 2020-10-15 ENCOUNTER — AMBULATORY - HEALTHEAST (OUTPATIENT)
Dept: NURSING | Facility: CLINIC | Age: 65
End: 2020-10-15

## 2020-10-15 ENCOUNTER — AMBULATORY - HEALTHEAST (OUTPATIENT)
Dept: FAMILY MEDICINE | Facility: CLINIC | Age: 65
End: 2020-10-15

## 2020-10-15 DIAGNOSIS — Z23 NEED FOR IMMUNIZATION AGAINST INFLUENZA: ICD-10-CM

## 2020-10-28 LAB
LAB AP CHARGES (HE HISTORICAL CONVERSION): ABNORMAL
PATH REPORT.ADDENDUM SPEC: ABNORMAL
PATH REPORT.COMMENTS IMP SPEC: ABNORMAL
PATH REPORT.COMMENTS IMP SPEC: ABNORMAL
PATH REPORT.FINAL DX SPEC: ABNORMAL
PATH REPORT.GROSS SPEC: ABNORMAL
PATH REPORT.MICROSCOPIC SPEC OTHER STN: ABNORMAL
PATH REPORT.RELEVANT HX SPEC: ABNORMAL
RESULT FLAG (HE HISTORICAL CONVERSION): ABNORMAL

## 2020-11-05 ENCOUNTER — COMMUNICATION - HEALTHEAST (OUTPATIENT)
Dept: RHEUMATOLOGY | Facility: CLINIC | Age: 65
End: 2020-11-05

## 2020-11-05 DIAGNOSIS — M05.79 SEROPOSITIVE RHEUMATOID ARTHRITIS OF MULTIPLE SITES (H): ICD-10-CM

## 2020-12-31 ENCOUNTER — COMMUNICATION - HEALTHEAST (OUTPATIENT)
Dept: FAMILY MEDICINE | Facility: CLINIC | Age: 65
End: 2020-12-31

## 2021-01-05 ENCOUNTER — RECORDS - HEALTHEAST (OUTPATIENT)
Dept: ADMINISTRATIVE | Facility: OTHER | Age: 66
End: 2021-01-05

## 2021-01-06 ENCOUNTER — AMBULATORY - HEALTHEAST (OUTPATIENT)
Dept: NURSING | Facility: CLINIC | Age: 66
End: 2021-01-06

## 2021-01-06 ENCOUNTER — COMMUNICATION - HEALTHEAST (OUTPATIENT)
Dept: FAMILY MEDICINE | Facility: CLINIC | Age: 66
End: 2021-01-06

## 2021-01-06 DIAGNOSIS — Z78.0 MENOPAUSE: ICD-10-CM

## 2021-01-21 ENCOUNTER — RECORDS - HEALTHEAST (OUTPATIENT)
Dept: ADMINISTRATIVE | Facility: OTHER | Age: 66
End: 2021-01-21

## 2021-01-22 ENCOUNTER — RECORDS - HEALTHEAST (OUTPATIENT)
Dept: BONE DENSITY | Facility: CLINIC | Age: 66
End: 2021-01-22

## 2021-01-22 ENCOUNTER — RECORDS - HEALTHEAST (OUTPATIENT)
Dept: ADMINISTRATIVE | Facility: OTHER | Age: 66
End: 2021-01-22

## 2021-01-22 DIAGNOSIS — Z78.0 ASYMPTOMATIC MENOPAUSAL STATE: ICD-10-CM

## 2021-02-24 ENCOUNTER — RECORDS - HEALTHEAST (OUTPATIENT)
Dept: ADMINISTRATIVE | Facility: OTHER | Age: 66
End: 2021-02-24

## 2021-04-01 ENCOUNTER — COMMUNICATION - HEALTHEAST (OUTPATIENT)
Dept: RHEUMATOLOGY | Facility: CLINIC | Age: 66
End: 2021-04-01

## 2021-04-01 DIAGNOSIS — M05.79 SEROPOSITIVE RHEUMATOID ARTHRITIS OF MULTIPLE SITES (H): ICD-10-CM

## 2021-04-06 ENCOUNTER — COMMUNICATION - HEALTHEAST (OUTPATIENT)
Dept: RHEUMATOLOGY | Facility: CLINIC | Age: 66
End: 2021-04-06

## 2021-04-06 ENCOUNTER — OFFICE VISIT - HEALTHEAST (OUTPATIENT)
Dept: RHEUMATOLOGY | Facility: CLINIC | Age: 66
End: 2021-04-06

## 2021-04-06 DIAGNOSIS — Z79.899 HIGH RISK MEDICATION USE: ICD-10-CM

## 2021-04-06 DIAGNOSIS — M05.79 SEROPOSITIVE RHEUMATOID ARTHRITIS OF MULTIPLE SITES (H): ICD-10-CM

## 2021-04-06 DIAGNOSIS — R76.8 CYCLIC CITRULLINATED PEPTIDE (CCP) ANTIBODY POSITIVE: ICD-10-CM

## 2021-04-06 RX ORDER — ANASTROZOLE 1 MG/1
1 TABLET ORAL DAILY
Status: SHIPPED | COMMUNITY
Start: 2021-03-08

## 2021-04-06 RX ORDER — HYDROXYCHLOROQUINE SULFATE 200 MG/1
TABLET, FILM COATED ORAL
Qty: 180 TABLET | Refills: 0 | Status: SHIPPED | OUTPATIENT
Start: 2021-04-06 | End: 2021-10-11

## 2021-04-08 ENCOUNTER — AMBULATORY - HEALTHEAST (OUTPATIENT)
Dept: LAB | Facility: CLINIC | Age: 66
End: 2021-04-08

## 2021-04-08 DIAGNOSIS — M05.79 SEROPOSITIVE RHEUMATOID ARTHRITIS OF MULTIPLE SITES (H): ICD-10-CM

## 2021-04-08 LAB
ALBUMIN SERPL-MCNC: 3.8 G/DL (ref 3.5–5)
ALT SERPL W P-5'-P-CCNC: 22 U/L (ref 0–45)
CREAT SERPL-MCNC: 0.77 MG/DL (ref 0.6–1.1)
ERYTHROCYTE [DISTWIDTH] IN BLOOD BY AUTOMATED COUNT: 12.2 % (ref 11–14.5)
GFR SERPL CREATININE-BSD FRML MDRD: >60 ML/MIN/1.73M2
HCT VFR BLD AUTO: 38.4 % (ref 35–47)
HGB BLD-MCNC: 12.3 G/DL (ref 12–16)
MCH RBC QN AUTO: 29 PG (ref 27–34)
MCHC RBC AUTO-ENTMCNC: 32 G/DL (ref 32–36)
MCV RBC AUTO: 91 FL (ref 80–100)
PLATELET # BLD AUTO: 275 THOU/UL (ref 140–440)
PMV BLD AUTO: 9.7 FL (ref 7–10)
RBC # BLD AUTO: 4.24 MILL/UL (ref 3.8–5.4)
WBC: 5.1 THOU/UL (ref 4–11)

## 2021-04-21 ENCOUNTER — RECORDS - HEALTHEAST (OUTPATIENT)
Dept: ADMINISTRATIVE | Facility: OTHER | Age: 66
End: 2021-04-21

## 2021-05-24 ENCOUNTER — RECORDS - HEALTHEAST (OUTPATIENT)
Dept: ADMINISTRATIVE | Facility: CLINIC | Age: 66
End: 2021-05-24

## 2021-05-25 ENCOUNTER — RECORDS - HEALTHEAST (OUTPATIENT)
Dept: ADMINISTRATIVE | Facility: CLINIC | Age: 66
End: 2021-05-25

## 2021-05-26 ENCOUNTER — RECORDS - HEALTHEAST (OUTPATIENT)
Dept: ADMINISTRATIVE | Facility: CLINIC | Age: 66
End: 2021-05-26

## 2021-05-27 ENCOUNTER — RECORDS - HEALTHEAST (OUTPATIENT)
Dept: ADMINISTRATIVE | Facility: CLINIC | Age: 66
End: 2021-05-27

## 2021-05-29 ENCOUNTER — RECORDS - HEALTHEAST (OUTPATIENT)
Dept: ADMINISTRATIVE | Facility: CLINIC | Age: 66
End: 2021-05-29

## 2021-05-30 ENCOUNTER — RECORDS - HEALTHEAST (OUTPATIENT)
Dept: ADMINISTRATIVE | Facility: CLINIC | Age: 66
End: 2021-05-30

## 2021-05-30 VITALS — BODY MASS INDEX: 27.25 KG/M2 | WEIGHT: 179.2 LBS

## 2021-05-31 VITALS — BODY MASS INDEX: 27.16 KG/M2 | HEIGHT: 68 IN | WEIGHT: 179.2 LBS

## 2021-05-31 VITALS — HEIGHT: 68 IN | BODY MASS INDEX: 27.16 KG/M2 | WEIGHT: 179.2 LBS

## 2021-06-01 VITALS — BODY MASS INDEX: 27.43 KG/M2 | HEIGHT: 68 IN | WEIGHT: 181 LBS

## 2021-06-01 VITALS — HEIGHT: 68 IN | WEIGHT: 176 LBS | BODY MASS INDEX: 26.67 KG/M2

## 2021-06-01 VITALS — WEIGHT: 178 LBS | BODY MASS INDEX: 27.06 KG/M2

## 2021-06-01 NOTE — PROGRESS NOTES
ASSESSMENT AND PLAN:  Elza Polk 64 y.o. female  is here for follow-up.  She has seropositive rheumatoid arthritis doing great on hydroxychloroquine.  Due for eye examination.  Her joint symptoms likely secondary to osteoarthritis such as the right fifth digit PIP, DIP involvement, and left trochanteric area bursitis.  Management principles were reviewed.  Continue with the current plan.  Follow-up here in 6 months, recent labs normal.     Diagnoses and all orders for this visit:    Seropositive rheumatoid arthritis of multiple sites (H)    Osteoarthritis Of The Knee    Cyclic citrullinated peptide (CCP) antibody positive    High risk medication use    Trochanteric bursitis, left hip           HISTORY OF PRESENTING ILLNESS:  Elza Polk 64 y.o.  is here for followup of Rheumatoid Arthritis.  This is associated with anti-CCP antibody.  She continues doing great with hydroxychloroquine only.  Except pain in her left wrist where she had a fracture some time ago.  Overall pain level to be 2.0 this time.  She has noted intermittent discomfort in her left wrist where she sustained a fracture couple of years ago.  She has noted pain in her right fifth digit PIP DIP areas more so with activity.  Left trochanteric region pain especially at night when she rolls over on that side without radiation.  She is able to do all her day-to-day activities.  She has noted morning stiffness no more than 5 to 10 minutes.  She is aware that she is due for eye examination with hydroxychloroquine on board.  She had a labs drawn last week which are within normal range.  There is no fever or weight loss blurry vision eye redness mouth also nausea there is no rash.  She had some cough she feels that this is likely due to respiratory tract infection which typically self-limited.  Joints affected include multiple joints. Her arthropathy presented a few years ago. Onset was gradual. Her rheumatoid arthritis   symptoms are stable.Symptoms included joint pain, joint swelling and morning stiffness and were of moderate severity.Symptoms are made worse by: nothing. Symptoms are helped by current regimen of only hydroxychloroquine.  Patient denies associated nausea, new headache, nodules, oral ulcers, Raynaud's and seizures.  Overall disease activity:  stable. Limitation on activities as noted in the MDHAQ scanned in the EMR.  Further historical information, including ROS as noted in the multidimensional health assessment questionnaire scanned in the EMR and in the assessment and plan section.  Rheumatoid Arthritis Disease Activity Measure used: RAPID3, reviewed  today and scanned in the chart.        ALLERGIES:Penicillins and Penicillin    PAST MEDICAL/ACTIVE PROBLEMS/MEDICATION/SOCIAL DATA  Past Medical History:   Diagnosis Date     H/O small bowel obstruction      Rheumatoid arthritis (H)      Social History     Tobacco Use   Smoking Status Former Smoker   Smokeless Tobacco Never Used     Patient Active Problem List   Diagnosis     Osteoarthritis Of The Knee     Ulnar neuropathy     High risk medication use     Health care maintenance     Seropositive rheumatoid arthritis of multiple sites (H)     Cyclic citrullinated peptide (CCP) antibody positive     Menopause     S/P total hysterectomy     Trochanteric bursitis, left hip     Lumbar paraspinal muscle spasm     Current Outpatient Medications   Medication Sig Dispense Refill     CALCIUM CARBONATE/VITAMIN D3 (CALCIUM+D ORAL) Take 1 tablet by mouth daily.       hydroxychloroquine (PLAQUENIL) 200 mg tablet TAKE 1 TABLET(200 MG) BY MOUTH TWICE DAILY WITH MEALS 180 tablet 0     multivitamin (ONE A DAY) per tablet Take 1 tablet by mouth daily.       PREMARIN 0.625 mg tablet TAKE 1 TABLET(0.625 MG TOTAL) BY MOUTH DAILY 90 tablet 3     No current facility-administered medications for this visit.        DETAILED EXAMINATION  09/04/19  :  Vitals:    09/04/19 1654   BP: 108/62    Patient Site: Right Arm   Patient Position: Sitting   Cuff Size: Adult Large   Pulse: 68   Weight: 188 lb (85.3 kg)     Alert oriented. Head including the face is examined for malar rash, heliotropes, scarring, lupus pernio. Eyes examined for redness such as in episcleritis/scleritis, periorbital lesions.   Neck/ Face examined for parotid gland swelling, range of motion of neck.  Left upper and lower and right upper and lower extremities examined for tenderness, swelling, warmth of the appendicular joints, range of motion, edema, rash.  Some of the important findings included: She does not have synovitis in the palpable appendicular joints of the upper extremities, knees.  Mild tenderness at the right fifth DIP PIP.  Mild discomfort left wrist.  Scar from previous injury.  LAB / IMAGING DATA:  ALT   Date Value Ref Range Status   08/27/2019 15 0 - 45 U/L Final   08/27/2018 20 0 - 45 U/L Final   06/29/2018 23 0 - 45 U/L Final     Albumin   Date Value Ref Range Status   08/27/2019 3.7 3.5 - 5.0 g/dL Final   08/27/2018 3.9 3.5 - 5.0 g/dL Final   06/29/2018 3.9 3.5 - 5.0 g/dL Final     Creatinine   Date Value Ref Range Status   08/27/2019 0.75 0.60 - 1.10 mg/dL Final   08/27/2018 0.77 0.60 - 1.10 mg/dL Final   06/29/2018 0.76 0.60 - 1.10 mg/dL Final       WBC   Date Value Ref Range Status   08/27/2019 8.1 4.0 - 11.0 thou/uL Final   08/27/2018 5.6 4.0 - 11.0 thou/uL Final   08/06/2015 6.8 4.0 - 11.0 thou/uL Final   06/08/2015 7.5 4.0 - 11.0 thou/uL Final     Hemoglobin   Date Value Ref Range Status   08/27/2019 12.3 12.0 - 16.0 g/dL Final   08/27/2018 11.9 (L) 12.0 - 16.0 g/dL Final   06/29/2018 12.7 12.0 - 16.0 g/dL Final     Platelets   Date Value Ref Range Status   08/27/2019 291 140 - 440 thou/uL Final   08/27/2018 268 140 - 440 thou/uL Final   06/29/2018 269 140 - 440 thou/uL Final       Lab Results   Component Value Date    RF 17 01/14/2011    SEDRATE 28 (H) 01/31/2011

## 2021-06-02 VITALS — WEIGHT: 178 LBS | BODY MASS INDEX: 27.06 KG/M2

## 2021-06-02 NOTE — TELEPHONE ENCOUNTER
I called LM on the pt's personal VM for her to c/b to inform as to when the pt's last eye exam was.

## 2021-06-03 VITALS — WEIGHT: 188.9 LBS | BODY MASS INDEX: 28.63 KG/M2 | HEIGHT: 68 IN

## 2021-06-03 VITALS
DIASTOLIC BLOOD PRESSURE: 62 MMHG | BODY MASS INDEX: 28.8 KG/M2 | WEIGHT: 188 LBS | HEART RATE: 68 BPM | SYSTOLIC BLOOD PRESSURE: 108 MMHG

## 2021-06-04 VITALS
BODY MASS INDEX: 28.34 KG/M2 | DIASTOLIC BLOOD PRESSURE: 76 MMHG | SYSTOLIC BLOOD PRESSURE: 122 MMHG | WEIGHT: 185 LBS | HEART RATE: 74 BPM

## 2021-06-05 VITALS
HEIGHT: 68 IN | RESPIRATION RATE: 16 BRPM | DIASTOLIC BLOOD PRESSURE: 74 MMHG | TEMPERATURE: 97.5 F | BODY MASS INDEX: 30.01 KG/M2 | WEIGHT: 198 LBS | HEART RATE: 80 BPM | SYSTOLIC BLOOD PRESSURE: 130 MMHG

## 2021-06-05 VITALS — HEIGHT: 68 IN | WEIGHT: 197 LBS | BODY MASS INDEX: 29.86 KG/M2

## 2021-06-05 VITALS — WEIGHT: 197 LBS | HEIGHT: 68 IN | BODY MASS INDEX: 29.86 KG/M2

## 2021-06-06 NOTE — PROGRESS NOTES
ASSESSMENT AND PLAN:  Elza Polk 64 y.o. female  is here for follow-up of seropositive rheumatoid arthritis.  She is on hydroxychloroquine.  Her synovitis is well controlled.  She has had her eyes examined last fall.  Due for labs today.  Management principles of OA reviewed.  Follow-up in 6 months.          Diagnoses and all orders for this visit:    Seropositive rheumatoid arthritis of multiple sites (H)  -     hydroxychloroquine (PLAQUENIL) 200 mg tablet; TAKE 1 TABLET(200 MG) BY MOUTH TWICE DAILY WITH MEALS  Dispense: 180 tablet; Refill: 0    High risk medication use    Cyclic citrullinated peptide (CCP) antibody positive           HISTORY OF PRESENTING ILLNESS:  Elza Polk 64 y.o.  is here for followup of seropositive rheumatoid arthritis.  This is associated with anti-CCP antibody.  She continues doing great with hydroxychloroquine only.  Except pain in her left wrist where she had a fracture some time ago.  Overall pain level to be 0/10 this time she has not had any sustained stiffness.  This does happen if she were to take more sugar than she considers higher than usual consumption.  She had her eyes examined this past fall.  She is due for labs.  She is aware that she is due for eye examination with hydroxychloroquine on board.  She had a labs drawn last week which are within normal range.  There is no fever or weight loss blurry vision eye redness mouth also nausea there is no rash.  She had some cough she feels that this is likely due to respiratory tract infection which typically self-limited.  Joints affected include multiple joints. Her arthropathy presented a few years ago. Onset was gradual. Her rheumatoid arthritis  symptoms are stable.Symptoms included joint pain, joint swelling and morning stiffness and were of moderate severity.Symptoms are made worse by: nothing. Symptoms are helped by current regimen of only hydroxychloroquine.  Patient denies associated  nausea, new headache, nodules, oral ulcers, Raynaud's and seizures.  Overall disease activity:  stable. Limitation on activities as noted in the MDHAQ scanned in the EMR.  Further historical information, including ROS as noted in the multidimensional health assessment questionnaire scanned in the EMR and in the assessment and plan section.  Rheumatoid Arthritis Disease Activity Measure used: RAPID3, reviewed  today and scanned in the chart.        ALLERGIES:Penicillins and Penicillin    PAST MEDICAL/ACTIVE PROBLEMS/MEDICATION/SOCIAL DATA  Past Medical History:   Diagnosis Date     H/O small bowel obstruction      Rheumatoid arthritis (H)      Social History     Tobacco Use   Smoking Status Former Smoker   Smokeless Tobacco Never Used     Patient Active Problem List   Diagnosis     Osteoarthritis Of The Knee     Ulnar neuropathy     High risk medication use     Health care maintenance     Seropositive rheumatoid arthritis of multiple sites (H)     Cyclic citrullinated peptide (CCP) antibody positive     Menopause     S/P total hysterectomy     Trochanteric bursitis, left hip     Lumbar paraspinal muscle spasm     Current Outpatient Medications   Medication Sig Dispense Refill     CALCIUM CARBONATE/VITAMIN D3 (CALCIUM+D ORAL) Take 1 tablet by mouth daily.       hydroxychloroquine (PLAQUENIL) 200 mg tablet TAKE 1 TABLET(200 MG) BY MOUTH TWICE DAILY WITH MEALS 180 tablet 0     multivitamin (ONE A DAY) per tablet Take 1 tablet by mouth daily.       PREMARIN 0.625 mg tablet TAKE 1 TABLET(0.625 MG TOTAL) BY MOUTH DAILY 90 tablet 3     No current facility-administered medications for this visit.        DETAILED EXAMINATION  03/04/20  :  Vitals:    03/04/20 1625   BP: 122/76   Patient Site: Right Arm   Patient Position: Sitting   Cuff Size: Adult Regular   Pulse: 74   Weight: 185 lb (83.9 kg)     Alert oriented. Head including the face is examined for malar rash, heliotropes, scarring, lupus pernio. Eyes examined for redness  such as in episcleritis/scleritis, periorbital lesions.   Neck/ Face examined for parotid gland swelling, range of motion of neck.  Left upper and lower and right upper and lower extremities examined for tenderness, swelling, warmth of the appendicular joints, range of motion, edema, rash.  Some of the important findings included: she does not have evidence of synovitis in the palpable joints of the upper extremities.  No significant deformities of the digits.  no Heberden nodes.  Range of motion of the shoulders show full abduction.  No JLT effusion or warmth of the knees.          LAB / IMAGING DATA:  ALT   Date Value Ref Range Status   08/27/2019 15 0 - 45 U/L Final   08/27/2018 20 0 - 45 U/L Final   06/29/2018 23 0 - 45 U/L Final     Albumin   Date Value Ref Range Status   08/27/2019 3.7 3.5 - 5.0 g/dL Final   08/27/2018 3.9 3.5 - 5.0 g/dL Final   06/29/2018 3.9 3.5 - 5.0 g/dL Final     Creatinine   Date Value Ref Range Status   08/27/2019 0.75 0.60 - 1.10 mg/dL Final   08/27/2018 0.77 0.60 - 1.10 mg/dL Final   06/29/2018 0.76 0.60 - 1.10 mg/dL Final       WBC   Date Value Ref Range Status   08/27/2019 8.1 4.0 - 11.0 thou/uL Final   08/27/2018 5.6 4.0 - 11.0 thou/uL Final   08/06/2015 6.8 4.0 - 11.0 thou/uL Final   06/08/2015 7.5 4.0 - 11.0 thou/uL Final     Hemoglobin   Date Value Ref Range Status   08/27/2019 12.3 12.0 - 16.0 g/dL Final   08/27/2018 11.9 (L) 12.0 - 16.0 g/dL Final   06/29/2018 12.7 12.0 - 16.0 g/dL Final     Platelets   Date Value Ref Range Status   08/27/2019 291 140 - 440 thou/uL Final   08/27/2018 268 140 - 440 thou/uL Final   06/29/2018 269 140 - 440 thou/uL Final       Lab Results   Component Value Date    RF 17 01/14/2011    SEDRATE 28 (H) 01/31/2011

## 2021-06-08 NOTE — PROGRESS NOTES
ASSESSMENT AND PLAN:  Elza Polk 61 y.o. female  Has seropositive rheumatoid arthritis, Recently she fell and had fracture of left wrist distal radius.  She had ORIF may require another surgery shortly.  The surgeon had asked her to not take methotrexate.  She has fortunately not seem to have every so far she is on hydroxychloroquine.    I have asked her to return for follow-up in 6 months.    Diagnoses and all orders for this visit:    Seropositive rheumatoid arthritis of multiple sites    Osteoarthritis Of The Knee    High risk medication use           HISTORY OF PRESENTING ILLNESS:  Elza Polk 61 y.o. is here for followup of seropositive Rheumatoid Arthritis. Joints affected include multiple joints. Her arthropathy presented a few years. Onset was gradual. Her rheumatoid arthritis  symptoms are stable.Symptoms included joint pain, joint swelling and morning stiffness and were of moderate severity.Symptoms are made worse by: nothing. Symptoms are helped by current regimen.  Patient denies associated nausea, new headache, nodules, oral ulcers, Raynaud's and seizures.  Overall disease activity:  stable. Limitation on activities as noted in the MDHAQ scanned in the EMR.  Further historical information, including ROS as noted in the multidimensional health assessment questionnaire scanned in the EMR and in the assessment and plan section.  Rheumatoid Arthritis Disease Activity Measure used: RAPID3, reviewed  today and scanned in the chart.        ALLERGIES:Penicillins    PAST MEDICAL/ACTIVE PROBLEMS/MEDICATION/SOCIAL DATA  Past Medical History   Diagnosis Date     H/O small bowel obstruction      Rheumatoid arthritis      History   Smoking Status     Former Smoker   Smokeless Tobacco     Not on file     Patient Active Problem List   Diagnosis     Osteoarthritis Of The Knee     Ulnar neuropathy     High risk medication use     Health care maintenance     Seropositive rheumatoid arthritis of multiple  sites     Left wrist pain     Current Outpatient Prescriptions   Medication Sig Dispense Refill     b complex vitamins tablet Take 1 tablet by mouth daily.       CALCIUM CARBONATE/VITAMIN D3 (CALCIUM+D ORAL) Take 1 tablet by mouth daily.       cyclobenzaprine (FLEXERIL) 10 MG tablet Take 10 mg by mouth bedtime as needed.       doxycycline (ADOXA) 100 MG tablet        estrogens, conjugated, (PREMARIN) 0.625 MG tablet Take 0.625 mg by mouth daily.       hydroxychloroquine (PLAQUENIL) 200 mg tablet TAKE 1 TABLET BY MOUTH TWICE DAILY WITH FOOD. 60 tablet 0     multivitamin (ONE A DAY) per tablet Take 1 tablet by mouth daily.       omega-3 fatty acids (SUPER OMEGA-3) 1,000 mg cap Take 1 capsule by mouth daily.       oxyCODONE-acetaminophen (PERCOCET) 5-325 mg per tablet Take 1 tablet by mouth every 4 (four) hours as needed for pain.       No current facility-administered medications for this visit.          DETAILED EXAMINATION:  Vitals:    01/17/17 1514   BP: 118/78   Patient Site: Right Arm   Patient Position: Sitting   Cuff Size: Adult Regular   Pulse: 64   Weight: 179 lb 3.2 oz (81.3 kg)    Comfortable.  Alert oriented.  Eyes are without inflammatory changes.  Examination of both upper and lower extremities is performed for swollen & tender joints, range of motion, rash, weakness, discoloration, warmth, swelling.  The skin examined for nodules. The salient normal / abnormal findings are appended.  No tenderness, swelling or synovitis in any of the palpable appendicular joints.except swelling of the left hand dorsum/MCP area.  Has a left wrist brace.    LAB / IMAGING DATA:  ALT   Date Value Ref Range Status   12/23/2016 17 0 - 45 U/L Final   09/30/2016 10 0 - 45 U/L Final   07/20/2016 14 0 - 45 U/L Final     ALBUMIN   Date Value Ref Range Status   12/23/2016 3.9 3.5 - 5.0 g/dL Final   09/30/2016 3.8 3.5 - 5.0 g/dL Final   07/20/2016 3.7 3.5 - 5.0 g/dL Final     CREATININE   Date Value Ref Range Status   12/23/2016  0.81 0.60 - 1.10 mg/dL Final   09/30/2016 0.73 0.60 - 1.10 mg/dL Final   07/20/2016 0.77 0.60 - 1.10 mg/dL Final       WBC   Date Value Ref Range Status   12/23/2016 6.0 4.0 - 11.0 thou/uL Final   09/30/2016 6.0 4.0 - 11.0 thou/uL Final   08/06/2015 6.8 4.0 - 11.0 thou/uL Final   06/08/2015 7.5 4.0 - 11.0 thou/uL Final     HEMOGLOBIN   Date Value Ref Range Status   12/23/2016 11.9 (L) 12.0 - 16.0 g/dL Final   09/30/2016 12.4 12.0 - 16.0 g/dL Final   07/20/2016 11.6 (L) 12.0 - 16.0 g/dL Final     PLATELETS   Date Value Ref Range Status   12/23/2016 274 140 - 440 thou/uL Final   09/30/2016 280 140 - 440 thou/uL Final   07/20/2016 265 140 - 440 thou/uL Final       Lab Results   Component Value Date    RF 17 01/14/2011    SEDRATE 28 (H) 01/31/2011

## 2021-06-10 NOTE — TELEPHONE ENCOUNTER
Refill Approved    Rx renewed per Medication Renewal Policy. Medication was last renewed on 8/14/19.    Beth Fletcher, [facility] Triage/Med Refill 8/2/2020     Requested Prescriptions   Pending Prescriptions Disp Refills     PREMARIN 0.625 mg tablet [Pharmacy Med Name: PREMARIN 0.625MG TABS] 90 tablet 3     Sig: TAKE 1 TABLET(0.625 MG TOTAL) BY MOUTH DAILY       Hormone Replacement Therapy Refill Protocol Passed - 8/2/2020  3:11 AM        Passed - PCP or prescribing provider visit in past 12 months       Last office visit with prescriber/PCP: 9/17/2018 Deyanira Meraz MD OR same dept: Visit date not found OR same specialty: 9/17/2018 Deyanira Meraz MD  Last physical: 8/30/2019 Last MTM visit: Visit date not found     Next visit within 3 mo: Visit date not found  Next physical within 3 mo: Visit date not found  Prescriber OR PCP: Deyanira Meraz MD  Last diagnosis associated with med order: 1. Menopause  - PREMARIN 0.625 mg tablet [Pharmacy Med Name: PREMARIN 0.625MG TABS]; TAKE 1 TABLET(0.625 MG TOTAL) BY MOUTH DAILY  Dispense: 90 tablet; Refill: 3     If protocol passes may refill for 3 months.

## 2021-06-11 NOTE — TELEPHONE ENCOUNTER
Telephoned and spoke with Elza to share the pathology results from her left breast US-guided needle core biopsy and FNA of left axillary lymph node performed on 9/3/2020 at [facility].    We discussed breast anatomy, histologic type, and next steps.  Prognostic markers are pending and Elza knows I will call her when those have reported.      Patient already has surgical consult scheduled with Dr. Анна Coffey on 9/11/2020.    Emotional support and encouragement provided and questions answered.  I remain a resource to Elza as needed and an e-mail with links to reputable web sites has been sent. Deena Zepeda RN

## 2021-06-11 NOTE — PROGRESS NOTES
3rd attempt   LVMTCB- to go through rooming questions.   Unable to go via rooming questions, but provider  Successfully completed a VC with pt.     Bárbara Hatfield CMA [facility] 9/8/2020 4:28 PM

## 2021-06-11 NOTE — PROGRESS NOTES
2nd attempt   LVMTCB- to go through rooming questions. Will try again later     Bárbara Hatfield CMA [facility] 9/8/2020 3:57 PM

## 2021-06-11 NOTE — PROGRESS NOTES
"This is a 65 y.o.  female who I'm asked to see by Deyanira Meraz MD for evaluation of a left breast cancer.  This was picked up  on screening mammogram.  The patient cannot feel a mass.  A needle biopsy was done which shows an invasive lobular carcinoma.  It is estrogen receptor positive, progesterone receptor positive and HER-2 Is indeterminate and has been sent for FISH.    She has no family history of breast cancer.      PAST MEDICAL HISTORY:  Past Medical History:   Diagnosis Date     H/O small bowel obstruction      Rheumatoid arthritis (H)      Past Surgical History:   Procedure Laterality Date     HYSTERECTOMY  2008     OOPHORECTOMY  2008     MT FREEING BOWEL ADHESION,ENTEROLYSIS      Description: Laparoscopic Lysis Of Intestinal Adhesions;  Recorded: 08/22/2008;     MT TOTAL ABDOM HYSTERECTOMY      Description: Total Abdominal Hysterectomy;  Proc Date: 01/01/2005;  Comments: for uterine fibroids and adhesions; ovaries are gone, too     US BIOPSY FINE NEEDLE ASPIRATION LYMPH NODE (BREAST) LEFT Left 9/3/2020     US BREAST CORE BIOPSY LEFT Left 9/3/2020       Medications:    Current Outpatient Medications:      CALCIUM CARBONATE/VITAMIN D3 (CALCIUM+D ORAL), Take 1 tablet by mouth daily., Disp: , Rfl:      hydrOXYchloroQUINE (PLAQUENIL) 200 mg tablet, Take 1 tablet (200 mg total) by mouth 2 (two) times a day., Disp: 180 tablet, Rfl: 0     multivitamin (ONE A DAY) per tablet, Take 1 tablet by mouth daily., Disp: , Rfl:     Allergies:  Allergies   Allergen Reactions     Penicillins      Penicillin Rash       Social History:   reports that she has quit smoking. She has never used smokeless tobacco. She reports current alcohol use.    ROS:  A 12 point comprehensive review of systems was negative except as noted.    Physical Exam  Resp 16   Ht 5' 7.75\" (1.721 m)   Wt 197 lb (89.4 kg)   LMP 12/30/2008   Breastfeeding No   BMI 30.18 kg/m    General:alert, appears stated age and " cooperative  Lungs:clear to auscultation bilaterally  CV:regular rate and rhythm, S1, S2 normal, no murmur, click, rub or gallop  Breasts: Subtle palpable mass in the central portion of the left breast just superior medial to the nipple.  Hard to tell if this is just bruising from her needle biopsy.  Lymph Nodes:no axillary nodes palpated  Neuro:Grossly normal  Musculoskeletal: Normal range of motion of her upper extremities.  Skin: Normal skin turgor.  Psych: Very calm demeanor    Reviewed her mammograms and ultrasound and pathology.     Impression: Left Breast Cancer. Clinically T1, N0.  Discussed the surgical options for treatment of breast cancer which generally are a lumpectomy (partial mastectomy) combined with radiation versus a mastectomy.  Explained that the survival benefit is the same for both.  The difference is in local recurrence risk.  The patient is a Excellent candidate for a lumpectomy.  I did warn her that lobular carcinomas can sometimes be a little more tricky and can sometimes be bigger than what they appear to be on mammogram and ultrasound but I think she is a very good candidate to at least try a lumpectomy.  That is what she would like to try to do.  Discussed SLN biopsy.  The procedure and rationale were explained.  Discussed that at this point we do not know yet whether or not she will need chemotherapy and we may not know until we get all of the results of surgery back.  If the HER-2 does come back positive then she for sure will be recommended to do chemotherapy but likely it will come back negative and then we will need an Oncotype after surgery to determine chemotherapy or not.      Plan: We'll schedule for a left  lumpectomy with wire localization with sentinel lymph node biopsy.  This is typically an outpatient procedure under local MAC anesthetic.  The risks and benefits of surgery were explained.  Also talked about expected recovery time.  The patient wants to go over the material  over the weekend and then will contact my  on Monday to get surgery set up.

## 2021-06-11 NOTE — PROGRESS NOTES
Assessment:      Healthy female exam.    1. Routine general medical examination at a health care facility  Comprehensive Metabolic Panel    Lipid Cascade FASTING    HM1(CBC and Differential)    Thyroid Stimulating Hormone (TSH)    Vitamin D, Total (25-Hydroxy)    HM1 (CBC with Diff)    Gynecologic Cytology (PAP Smear)    HPV High Risk DNA Cervical   2. Immunization due  Varicella Zoster, Recombinant Vaccine IM   3. Tinea versicolor  ketoconazole (NIZORAL) 2 % shampoo          Plan:      This is a 64 yo female here for physical exam:  1.  Health Maintenance - due for labs; recent mammogram - had abnormality - needs a biopsy, this is scheduled soon; due for pap smear/HPV - done/sent  2.  Immumizations due - need for shingles vaccine - ordered  3.  Tinea versicolor - discoloration at neck - recurrent - would try Ketoconazole    Subjective:      Elza Polk is a 65 y.o. female who presents for an annual exam.. The patient reports that there is not domestic violence in her life.     Has left breast biopsy on Thursday     Had some skin blotchiness this summer  Had some texture issue on skin   Used Aloe Vera -   Discoloration more severe on neck        Healthy Habits:   Regular Exercise: Yes  Sunscreen Use: No  Healthy Diet: Yes  Dental Visits Regularly: Yes  Seat Belt: Yes  Sexually active: No  Self Breast Exam Monthly:irregular  Hemoccults: No  Flex Sig: No  Colonoscopy: Yes and up to date          Immunization History   Administered Date(s) Administered     DT (pediatric) 07/01/1994, 05/03/2006     DTaP, 5 Pertussis 07/01/1994, 05/03/2006     INFLUENZA,SEASONAL QUAD, PF, =/> 6months 12/23/2016, 02/18/2019, 11/15/2019     Influenza, Seasonal, Inj PF IIV3 01/26/2010     Influenza, inj, historic,unspecified 01/11/2017     Influenza, seasonal,quad inj 6-35 mos 01/26/2010, 11/22/2010, 12/01/2011, 11/26/2014, 10/11/2017     Influenza,seasonal, Inj IIV3 10/31/2003, 11/22/2010, 12/01/2011, 11/26/2014     Td, adult  adsorbed, PF 2006     Td,adult,historic,unspecified 2006     Tdap 06/15/2015     ZOSTER, RECOMBINANT, IM 2019, 2020     Immunization status: reviewed - .    No exam data present    Gynecologic History  Patient's last menstrual period was 2008.  Contraception: post menopausal status  Last Pap: unknown  Results were: normal  Last mammogram: this week. Results were: abnormal      OB History    Para Term  AB Living   2 2 2         SAB TAB Ectopic Multiple Live Births                  # Outcome Date GA Lbr Carlos Alberto/2nd Weight Sex Delivery Anes PTL Lv   2 Term            1 Term                Current Outpatient Medications   Medication Sig Dispense Refill     CALCIUM CARBONATE/VITAMIN D3 (CALCIUM+D ORAL) Take 1 tablet by mouth daily.       hydrOXYchloroQUINE (PLAQUENIL) 200 mg tablet TAKE 1 TABLET(200 MG) BY MOUTH TWICE DAILY WITH MEALS 180 tablet 0     multivitamin (ONE A DAY) per tablet Take 1 tablet by mouth daily.       PREMARIN 0.625 mg tablet TAKE 1 TABLET(0.625 MG TOTAL) BY MOUTH DAILY 90 tablet 0     No current facility-administered medications for this visit.      Past Medical History:   Diagnosis Date     H/O small bowel obstruction      Rheumatoid arthritis (H)      Past Surgical History:   Procedure Laterality Date     HYSTERECTOMY       OOPHORECTOMY       RI FREEING BOWEL ADHESION,ENTEROLYSIS      Description: Laparoscopic Lysis Of Intestinal Adhesions;  Recorded: 2008;     RI TOTAL ABDOM HYSTERECTOMY      Description: Total Abdominal Hysterectomy;  Proc Date: 2005;  Comments: for uterine fibroids and adhesions; ovaries are gone, too     US BIOPSY FINE NEEDLE ASPIRATION LYMPH NODE (BREAST) LEFT Left 9/3/2020     US BREAST CORE BIOPSY LEFT Left 9/3/2020     Penicillins and Penicillin  Family History   Problem Relation Age of Onset     Prostate cancer Father      Hypertension Mother      Social History     Socioeconomic History     Marital status:  "     Spouse name: Not on file     Number of children: Not on file     Years of education: Not on file     Highest education level: Not on file   Occupational History     Not on file   Social Needs     Financial resource strain: Not on file     Food insecurity     Worry: Not on file     Inability: Not on file     Transportation needs     Medical: Not on file     Non-medical: Not on file   Tobacco Use     Smoking status: Former Smoker     Smokeless tobacco: Never Used   Substance and Sexual Activity     Alcohol use: Yes     Comment: occasional      Drug use: Not on file     Sexual activity: Not on file   Lifestyle     Physical activity     Days per week: Not on file     Minutes per session: Not on file     Stress: Not on file   Relationships     Social connections     Talks on phone: Not on file     Gets together: Not on file     Attends Hinduism service: Not on file     Active member of club or organization: Not on file     Attends meetings of clubs or organizations: Not on file     Relationship status: Not on file     Intimate partner violence     Fear of current or ex partner: Not on file     Emotionally abused: Not on file     Physically abused: Not on file     Forced sexual activity: Not on file   Other Topics Concern     Not on file   Social History Narrative     Not on file       Review of Systems  Review of Systems     Pertinent positives as noted in HPI; otherwise 12 point ROS negative.        Objective:         Vitals:    08/31/20 0811   BP: 130/74   Pulse: 80   Resp: 16   Temp: 97.5  F (36.4  C)   TempSrc: Tympanic   Weight: 198 lb (89.8 kg)   Height: 5' 7.75\" (1.721 m)     Body mass index is 30.33 kg/m .    Physical  Physical Exam    EXAM:  /74 (Patient Site: Right Arm, Patient Position: Sitting, Cuff Size: Adult Regular)   Pulse 80   Temp 97.5  F (36.4  C) (Tympanic)   Resp 16   Ht 5' 7.75\" (1.721 m)   Wt 198 lb (89.8 kg)   LMP 12/30/2008   BMI 30.33 kg/m     Gen:  NAD, appears well, " well-hydrated  HEENT:  TMs nl, oropharynx benign, nasal mucosa nl, conjunctiva clear  Neck:  Supple, no adenopathy, no thyromegaly, no carotid bruits, no JVD  Lungs:  Clear to auscultation bilaterally  Breast exam:  No breast lumps, no skin changes, no nipple discharge, no axillary adenopathy  Cor:  RRR no murmur  Abd:  Soft, nontender, BS+, no masses, no guarding or rebound, no HSM  PELVIC EXAM:External genitalia: normal  Vaginal mucosa normal  Vaginal discharge: white - minimal  Speculum exam shows a normal appearing cervix .   Bimanual exam: Cervix closed, firm, non tender  to motion.  Uterus  firm, regular, mobile, non tender to palpation. No adnexal masses or tenderness.   Extr:  Neg.  Neuro:  No asymmetry, Nl motor tone/strength, nl sensation, reflexes =, gait nl, nl coordination, CN intact,   Skin:  Warm/dry; sl darker patches - irregular spaces

## 2021-06-11 NOTE — PROGRESS NOTES
Per Dr. Coffey's  request, order for Oncotype submitted through [facility] online portal.

## 2021-06-11 NOTE — TELEPHONE ENCOUNTER
Telephoned and spoke with Elza informing her of results of HER2 by FISH for her left breast US-guided needle core biopsy and FNA of left axillary lymph node performed on 9/3/2020 at [facility].  She is already scheduled for a lumpectomy and sentinel lymph node biopsy on 9/23.  Deena Zepeda RN

## 2021-06-11 NOTE — PROGRESS NOTES
"Elza Polk is a 65 y.o. female who is being evaluated via a billable video visit.      The patient has been notified of following:     \"This video visit will be conducted via a call between you and your physician/provider. We have found that certain health care needs can be provided without the need for an in-person physical exam.  This service lets us provide the care you need with a video conversation.  If a prescription is necessary we can send it directly to your pharmacy.  If lab work is needed we can place an order for that and you can then stop by our lab to have the test done at a later time.    Video visits are billed at different rates depending on your insurance coverage. Please reach out to your insurance provider with any questions.    If during the course of the call the physician/provider feels a video visit is not appropriate, you will not be charged for this service.\"    Patient has given verbal consent to a Video visit? Yes  How would you like to obtain your AVS? AVS Preference: MyChart.  If dropped by the video visit, the video invitation should be sent to: Text to cell phone: [phone]   Will anyone else be joining your video visit? No          Video-Visit Details    Type of service:  Video Visit    Originating Location (pt. Location): Home    Distant Location (provider location):  [facility]     Platform used for Video Visit: [facility]      ASSESSMENT AND PLAN:    Diagnoses and all orders for this visit:    Seropositive rheumatoid arthritis of multiple sites (H)  -     hydrOXYchloroQUINE (PLAQUENIL) 200 mg tablet; Take 1 tablet (200 mg total) by mouth 2 (two) times a day.  Dispense: 180 tablet; Refill: 0    High risk medication use    Cyclic citrullinated peptide (CCP) antibody positive          HISTORY OF PRESENTING ILLNESS:  Elza Polk 65 y.o. is evaluated here via video link.  She is here for follow-up.  This is for rheumatoid arthritis.  This is gone on for " several years.  This is seropositive (CCP antibody.  She is done remarkably well hydroxychloroquine.  She has noted discomfort in her right ring finger.  This is off and on, sometimes it seems to lock click, without swelling.  No other joint areas similarly affected.  In other joint areas she is doing great.  She had her eyes examination recently.  She had a full physical in August and those labs reviewed within acceptable range.     ROS enquiry held for fever, ocular symptoms, rash, headache,  GI issues.  Today we also discussed the issues related to the current pandemic, the pros and cons of the current treatment plan, the CDC guidelines such as social distancing washing the hands covering the cough.  ALLERGIES:Penicillins and Penicillin    PAST MEDICAL/ACTIVE PROBLEMS/MEDICATION/SOCIAL DATA  Past Medical History:   Diagnosis Date     H/O small bowel obstruction      Rheumatoid arthritis (H)      Social History     Tobacco Use   Smoking Status Former Smoker   Smokeless Tobacco Never Used     Patient Active Problem List   Diagnosis     Osteoarthritis Of The Knee     Ulnar neuropathy     High risk medication use     Health care maintenance     Seropositive rheumatoid arthritis of multiple sites (H)     Cyclic citrullinated peptide (CCP) antibody positive     Menopause     S/P total hysterectomy     Trochanteric bursitis, left hip     Lumbar paraspinal muscle spasm     Current Outpatient Medications   Medication Sig Dispense Refill     CALCIUM CARBONATE/VITAMIN D3 (CALCIUM+D ORAL) Take 1 tablet by mouth daily.       hydrOXYchloroQUINE (PLAQUENIL) 200 mg tablet TAKE 1 TABLET(200 MG) BY MOUTH TWICE DAILY WITH MEALS 180 tablet 0     multivitamin (ONE A DAY) per tablet Take 1 tablet by mouth daily.       PREMARIN 0.625 mg tablet TAKE 1 TABLET(0.625 MG TOTAL) BY MOUTH DAILY 90 tablet 0     No current facility-administered medications for this visit.          EXAMINATION:    Using the audio and video link as best as  possible the constitutional, neck, neurologic, psych, skin, both upper extremities areas/organ system were evaluated during this assessment.  Some of the important findings: There is no swelling of the small joints of the fingers she is able to fully flex all the digits into a fist bilaterally abduction shoulder is normal.      LAB / IMAGING DATA:  ALT   Date Value Ref Range Status   08/31/2020 19 0 - 45 U/L Final   03/04/2020 20 0 - 45 U/L Final   08/27/2019 15 0 - 45 U/L Final     Albumin   Date Value Ref Range Status   08/31/2020 3.9 3.5 - 5.0 g/dL Final   03/04/2020 4.0 3.5 - 5.0 g/dL Final   08/27/2019 3.7 3.5 - 5.0 g/dL Final     Creatinine   Date Value Ref Range Status   08/31/2020 0.80 0.60 - 1.10 mg/dL Final   03/04/2020 0.72 0.60 - 1.10 mg/dL Final   08/27/2019 0.75 0.60 - 1.10 mg/dL Final       WBC   Date Value Ref Range Status   08/31/2020 6.0 4.0 - 11.0 thou/uL Final   03/04/2020 7.5 4.0 - 11.0 thou/uL Final   08/06/2015 6.8 4.0 - 11.0 thou/uL Final   06/08/2015 7.5 4.0 - 11.0 thou/uL Final     Hemoglobin   Date Value Ref Range Status   08/31/2020 12.6 12.0 - 16.0 g/dL Final   03/04/2020 12.7 12.0 - 16.0 g/dL Final   08/27/2019 12.3 12.0 - 16.0 g/dL Final     Platelets   Date Value Ref Range Status   08/31/2020 293 140 - 440 thou/uL Final   03/04/2020 263 140 - 440 thou/uL Final   08/27/2019 291 140 - 440 thou/uL Final       Lab Results   Component Value Date    RF 17 01/14/2011    SEDRATE 28 (H) 01/31/2011     Duration of the call:8  Minutes  Start: 09/08/2020 04:14 pm   Stop: 09/08/2020 04:22 pm

## 2021-06-11 NOTE — PROGRESS NOTES
1st attempt   LVMTCB- to go through rooming questions. Will try again later     Bárbara Hatfield CMA [facility] 9/8/2020 1:56 PM

## 2021-06-11 NOTE — ANESTHESIA CARE TRANSFER NOTE
Last vitals:   Vitals:    09/23/20 1604   BP: 120/62   Pulse: 78   Resp: 16   Temp: 36.1  C (96.9  F)   SpO2: 100%     Patient's level of consciousness is drowsy  Spontaneous respirations: yes  Maintains airway independently: yes  Dentition unchanged: yes  Oropharynx: oropharynx clear of all foreign objects    QCDR Measures:  ASA# 20 - Surgical Safety Checklist: WHO surgical safety checklist completed prior to induction    PQRS# 430 - Adult PONV Prevention: 4558F - Pt received => 2 anti-emetic agents (different classes) preop & intraop  ASA# 8 - Peds PONV Prevention: NA - Not pediatric patient, not GA or 2 or more risk factors NOT present  PQRS# 424 - Felicia-op Temp Management: 4559F - At least one body temp DOCUMENTED => 35.5C or 95.9F within required timeframe  PQRS# 426 - PACU Transfer Protocol: - Transfer of care checklist used  ASA# 14 - Acute Post-op Pain: ASA14B - Patient did NOT experience pain >= 7 out of 10

## 2021-06-11 NOTE — PROGRESS NOTES
"In for follow-up of her left lumpectomy  with sentinel lymph node biopsy.  She is feeling well.  She is having very minimal pain.      Physical exam:  Appears well.  Does not appear in any discomfort.  Breasts: Incisions are healing nicely with no signs of infection.  No swelling.    Pathology: The tumor was 1.7 cm.  The margins are positive.  Her sentinel lymph node is positive for isolated tumor cells.    Impression: Postop visit.  Doing well physically.  I went over her pathology.  She unfortunately does need a reexcision lumpectomy.  I also explained that we are waiting for the Oncotype to come back to help determine whether or not she needs chemotherapy.  Unfortunately she was extremely disappointed about anything I had to say.  She felt that I had not warned her that this was a possibility even though it is standard practice for me to tell people that a reexcision lumpectomy is a possibility and I even particularly reinforce this with people with lobular carcinomas.  They are just trickier.  They are harder to feel.  The only way to make sure you do not have to do a reexcision is to do a mastectomy on everyone.  I also tell everyone that we do not know for sure that you do not need chemotherapy until we get all of the final pathology back including test that can take a couple weeks after surgery and that is the Oncotype.  She was very down throughout the visit but is agreeable to going ahead with the reexcision.  Even my  felt that some of her comments were worrisome.  Using wording such as \"money you do not have, life you do not have, great\".  I tried to be reassuring to her to let her know that this surgery is a very minor operation compared to the first one she had.  I also was very clear that there are no guarantees that we will have a negative margin with this operation, again unless I remove her entire breast.  It is always a little tricky to know just how much tissue to take as you do not want " to remove so much as to cause a horrible cosmetic deficit but you want to try to get around the cancer.  Plan: Reexcision lumpectomy.  And holding off on referral to oncology until we get all of the final results back.

## 2021-06-11 NOTE — ANESTHESIA POSTPROCEDURE EVALUATION
Patient: Elza Polk  Procedure(s):  Left Lumpectomy after Wire Loclaization; [place] Lymph Node Biopsy (Left)  Anesthesia type: MAC    Patient location: Phase II Recovery  Last vitals:   Vitals Value Taken Time   /64 9/23/2020  4:30 PM   Temp 36.1  C (96.9  F) 9/23/2020  4:04 PM   Pulse 75 9/23/2020  4:35 PM   Resp 16 9/23/2020  4:04 PM   SpO2 97 % 9/23/2020  4:35 PM   Vitals shown include unvalidated device data.  Post vital signs: stable  Level of consciousness: awake and responds to simple questions  Post-anesthesia pain: pain controlled  Post-anesthesia nausea and vomiting: no  Pulmonary: unassisted, return to baseline  Cardiovascular: stable and blood pressure at baseline  Hydration: adequate  Anesthetic events: no    QCDR Measures:  ASA# 11 - Felicia-op Cardiac Arrest: ASA11B - Patient did NOT experience unanticipated cardiac arrest  ASA# 12 - Felicia-op Mortality Rate: ASA12B - Patient did NOT die  ASA# 13 - PACU Re-Intubation Rate: NA - No ETT / LMA used for case  ASA# 10 - Composite Anes Safety: ASA10A - No serious adverse event    Additional Notes:

## 2021-06-11 NOTE — PROGRESS NOTES
Elza presents to [facility] for a post op appointment with Dr. Coffey .  She is accompanied by herself for appointment.    Patient met with Dr. Coffey .  See dictation for details of visit.  She needs to have re-excision of margins.  Walked her to [facility] for surgery scheduling.  Oncotype has been ordered and is pending.    RN time 12 mins

## 2021-06-11 NOTE — PROGRESS NOTES
Per radiologist request, made patient an appointment to see Dr. Coffey for surgical consult on Friday, 9-11-20 at 0940.

## 2021-06-11 NOTE — TELEPHONE ENCOUNTER
Telephoned and spoke with Elza returning her call from this morning.  The ER/OK from her left breast US-guided needle core biopsy and FNA of left axillary lymph node performed on 9/3/2020 at [facility] have also reported.  We discussed the prognostic markers and she knows HER2 by FISH is pending and writer will call her in 7-10 business days when those report.  We reviewed the pathology report again and her questions were answered.    Elza has been taking Premarin 0.625 mg tablet, by mouth, daily.  Advised her to communicate with prescribing provider about the new finding of estrogen/progesterone sensitive breast cancer at her earliest convenience.      Deena Zeepda RN

## 2021-06-11 NOTE — PROGRESS NOTES
"Elza presents to [facility] [place] today for a surgical consult with Dr. oCffey  regarding her newly diagnosed left breast cancer.  She is accompanied by herself for consult.  RN assessment and EMR update. 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  Patient given a Breast Cancer Packet, contents reviewed.  She met with Dr. Coffey  see dictation for details of visit. She will plan to think over her options and call to schedule next week. Her 2 pending by FISH, will await those results.   Support provided, invited calls.  RN time 18 mins  "

## 2021-06-11 NOTE — ANESTHESIA PREPROCEDURE EVALUATION
Anesthesia Evaluation      No history of anesthetic complications     Airway   Mallampati: III  Neck ROM: full   Pulmonary - normal exam   (+) a smoker (former)                         Cardiovascular - negative ROS  Exercise tolerance: > or = 4 METS  (-) angina  Rhythm: regular        Neuro/Psych    Neuromuscular disease: Ulnar neuropathy.    Endo/Other    (+) arthritis (RA), obesity,      GI/Hepatic/Renal - negative ROS     Comments: Hx of SBO     Other findings:     NPO > 8 hrs     Results for HO BROOKS (MRN [id number]) as of 9/23/2020 9/20/2020 14:06  2019-nCOV: Not Detected        Dental    (+) upper dentures    Comment: Partial                       Anesthesia Plan  Planned anesthetic: MAC      Toradol if ok with surgeon    ASA 3     Anesthetic plan and risks discussed with: patient  Anesthesia plan special considerations: antiemetics,   Post-op plan: routine recovery

## 2021-06-12 NOTE — PROGRESS NOTES
ASSESSMENT AND PLAN:  Elza Polk 61 y.o. female is here for follow-up of seropositive rheumatoid arthritis.  She is doing great just on hydroxychloroquine reminded her of the eye examination.  Recent labs are within acceptable range.  Persistent mild anemia.  Her left is no longer troubles at that is where she had fallen and fractured and had ORIF.  The knees do not trouble her either.  She is able to do all her day-to-day activities without difficulty.  Continue hydroxychloroquine return for follow-up in 6 months or sooner.    .    Diagnoses and all orders for this visit:    Seropositive rheumatoid arthritis of multiple sites    High risk medication use    Cyclic citrullinated peptide (CCP) antibody positive           HISTORY OF PRESENTING ILLNESS:  Elza Polk 61 y.o.  is here for followup of Rheumatoid Arthritis.  This is associated with anti-CCP antibody.  Joints affected include multiple joints. Her arthropathy presented a few years ago. Onset was gradual. Her rheumatoid arthritis  symptoms are stable.Symptoms included joint pain, joint swelling and morning stiffness and were of moderate severity.Symptoms are made worse by: nothing. Symptoms are helped by current regimen of only hydroxychloroquine.  Patient denies associated nausea, new headache, nodules, oral ulcers, Raynaud's and seizures.  Overall disease activity:  stable. Limitation on activities as noted in the MDHAQ scanned in the EMR.  Further historical information, including ROS as noted in the multidimensional health assessment questionnaire scanned in the EMR and in the assessment and plan section.  Rheumatoid Arthritis Disease Activity Measure used: RAPID3, reviewed  today and scanned in the chart.        ALLERGIES:Penicillins    PAST MEDICAL/ACTIVE PROBLEMS/MEDICATION/SOCIAL DATA  Past Medical History:   Diagnosis Date     H/O small bowel obstruction      Rheumatoid arthritis      History   Smoking Status     Former Smoker  "  Smokeless Tobacco     Not on file     Patient Active Problem List   Diagnosis     Osteoarthritis Of The Knee     Ulnar neuropathy     High risk medication use     Health care maintenance     Seropositive rheumatoid arthritis of multiple sites     Current Outpatient Prescriptions   Medication Sig Dispense Refill     CALCIUM CARBONATE/VITAMIN D3 (CALCIUM+D ORAL) Take 1 tablet by mouth daily.       estrogens, conjugated, (PREMARIN) 0.625 MG tablet Take 0.625 mg by mouth daily.       hydroxychloroquine (PLAQUENIL) 200 mg tablet TAKE 1 TABLET BY MOUTH TWICE DAILY WITH FOOD. 180 tablet 0     multivitamin (ONE A DAY) per tablet Take 1 tablet by mouth daily.       No current facility-administered medications for this visit.        DETAILED EXAMINATION  07/26/17  :  Vitals:    07/26/17 1458   BP: 108/66   Patient Site: Left Arm   Patient Position: Sitting   Cuff Size: Adult Regular   Pulse: 68   Weight: 179 lb 3.2 oz (81.3 kg)   Height: 5' 8\" (1.727 m)     Alert oriented. Head including the face is examined for malar rash, heliotropes, scarring, lupus pernio. Eyes examined for redness such as in episcleritis/scleritis, periorbital lesions.   Neck examined  for lymph nodes, range of motion Both upper and lower extremities (all four) examined for swollen, warm &/or  tender joints, range of motion, rash, muscle weakness, edema. The salient normal / abnormal findings are appended. No appreciable synovitis in any of the palpable appendicular joints.  She has a scar on her left wrist from the ORIF.  There is no joint line tenderness.  Full range of motion of the shoulders.   LAB / IMAGING DATA:  ALT   Date Value Ref Range Status   07/21/2017 16 0 - 45 U/L Final   12/23/2016 17 0 - 45 U/L Final   09/30/2016 10 0 - 45 U/L Final     Albumin   Date Value Ref Range Status   07/21/2017 3.6 3.5 - 5.0 g/dL Final   12/23/2016 3.9 3.5 - 5.0 g/dL Final   09/30/2016 3.8 3.5 - 5.0 g/dL Final     Creatinine   Date Value Ref Range Status "   07/21/2017 0.83 0.60 - 1.10 mg/dL Final   12/23/2016 0.81 0.60 - 1.10 mg/dL Final   09/30/2016 0.73 0.60 - 1.10 mg/dL Final       WBC   Date Value Ref Range Status   07/21/2017 6.7 4.0 - 11.0 thou/uL Final   12/23/2016 6.0 4.0 - 11.0 thou/uL Final   08/06/2015 6.8 4.0 - 11.0 thou/uL Final   06/08/2015 7.5 4.0 - 11.0 thou/uL Final     Hemoglobin   Date Value Ref Range Status   07/21/2017 11.6 (L) 12.0 - 16.0 g/dL Final   12/23/2016 11.9 (L) 12.0 - 16.0 g/dL Final   09/30/2016 12.4 12.0 - 16.0 g/dL Final     Platelets   Date Value Ref Range Status   07/21/2017 318 140 - 440 thou/uL Final   12/23/2016 274 140 - 440 thou/uL Final   09/30/2016 280 140 - 440 thou/uL Final       Lab Results   Component Value Date    RF 17 01/14/2011    SEDRATE 28 (H) 01/31/2011

## 2021-06-12 NOTE — TELEPHONE ENCOUNTER
Received a message from Elza today at 3:59 PM. Elza would like to cancel her Surgery on 10/7 with Dr. Coffey as well as her COVID19 test and post op appointment.    Kay Louis  Surgery Scheduler  [facility]  Surgery [facility]  Direct line: [phone]   Main Line: [phone]

## 2021-06-12 NOTE — TELEPHONE ENCOUNTER
Called Elza after hearing from [facility] that she cancelled her surgery for her + margins and post op appointment with Dr. Coffey.  LMOM for her that I was calling to check in and see how she is doing.  Asked her if there was anything I could help her with, such as setting up a second opinion surgical consult.  Told her to call back to let me know her plans as we just want to make sure she is receiving care somewhere.

## 2021-06-12 NOTE — TELEPHONE ENCOUNTER
Relayed Provider's message re: PCV 13 and 23 to patient via voice mail.  Included clinic phone number and date of call in message.

## 2021-06-12 NOTE — TELEPHONE ENCOUNTER
Called PCP clinic.  PCP is being covered by another Provider; Viviane Medrano.    Message forwarded to covering Provider.  Unable to reach anyone by phone.    Will inform patient attempt was made to obtain vaccine order.

## 2021-06-12 NOTE — TELEPHONE ENCOUNTER
At 3:13 patient scheduled appt for flu and pneumonia vaccines 4:15 today.     Left voice message informing patient that flu shot will be available for her but no order in place for pneumonia vaccine.    Included clinic phone number and date of call in message.    Will Provider place order for pneumonia vaccine if indicated?  Thank you.

## 2021-06-14 NOTE — TELEPHONE ENCOUNTER
See 10/15/20 encounter.  Called patient re: PCV-13 injection not administered that date; order now in place.  Patient scheduled for CSS appt 1/6/21 3:00.

## 2021-06-14 NOTE — TELEPHONE ENCOUNTER
I'm not sure what a referral to a radiologist is for?  What is this for?  I will set up referral for DXA scan.

## 2021-06-14 NOTE — TELEPHONE ENCOUNTER
I called and spoke with patient. She stated that her [facility] doctors is wanting her to do a follow up radiology appointment with them at the [facility]. She is not able to go down to [facility] and wants to get it done in the [facility]. I ask patient to call her [facility] since they are the one ordering for the radiology appointment. Patient understood no further questions.     Other message was given to patient about the DXA scan.

## 2021-06-14 NOTE — TELEPHONE ENCOUNTER
Reason for Call: Request for an order or referral:    Order or referral being requested: bone dexa scan      Also would like recommendation for radiologist here in the city   Date needed: as soon as possible    Has the patient been seen by the PCP for this problem? YES    Additional comments: [facility] is recommending she have this scan  Prior to medication to be prescribed   results to be sent to [facility]  Phone number Patient can be reached at:    Cell number on file:    Telephone Information:   Mobile [phone]       Best Time:  anytime    Can we leave a detailed message on this number?  Yes    Call taken on 1/6/2021 at 1:04 PM by Catrachita Duncan

## 2021-06-14 NOTE — PROGRESS NOTES
During chart review, noted that PCV 13 was not administered.  Future order for PCV-23 is in and is to be administered at or greater than 8 weeks after PCV-13.  Will call patient to schedule CSS appointment.

## 2021-06-15 NOTE — PROGRESS NOTES
ASSESSMENT AND PLAN:  Elza Polk 62 y.o. female is here for follow-up.  She has seropositive rheumatoid arthritis with significant titers of anti-CCP antibody.  She is doing great simply with the hydroxychloroquine.  Reminded of the eye examination.  She is due for labs to be done today.  She is going to return for follow-up here in 6 months or sooner.  .    Diagnoses and all orders for this visit:    Seropositive rheumatoid arthritis of multiple sites  -     hydroxychloroquine (PLAQUENIL) 200 mg tablet; TAKE 1 TABLET BY MOUTH TWICE DAILY WITH FOOD  Dispense: 180 tablet; Refill: 1  -     ALT (SGPT)  -     Creatinine  -     Albumin  -     HM2(CBC w/o Differential)    Osteoarthritis Of The Knee    High risk medication use  -     ALT (SGPT)  -     Creatinine  -     Albumin  -     HM2(CBC w/o Differential)    Cyclic citrullinated peptide (CCP) antibody positive         HISTORY OF PRESENTING ILLNESS:  Elza Polk 62 y.o.  is here for followup of Rheumatoid Arthritis.  This is associated with anti-CCP antibody.  She continues doing great with hydroxychloroquine only.  She noted pain level of 0.5/10.  Mild discomfort in the left wrist especially after long days work.  This is where she had at fracture.  She is able to do otherwise all her day-to-day activities without difficulty.  She has noted morning stiffness is limited to 30 minutes there is no fever or weight loss blurry vision eye redness mouth also nausea there is no rash.  She had some cough she feels that this is likely due to respiratory tract infection which typically self-limited.  Joints affected include multiple joints. Her arthropathy presented a few years ago. Onset was gradual. Her rheumatoid arthritis  symptoms are stable.Symptoms included joint pain, joint swelling and morning stiffness and were of moderate severity.Symptoms are made worse by: nothing. Symptoms are helped by current regimen of only hydroxychloroquine.  Patient denies  "associated nausea, new headache, nodules, oral ulcers, Raynaud's and seizures.  Overall disease activity:  stable. Limitation on activities as noted in the MDHAQ scanned in the EMR.  Further historical information, including ROS as noted in the multidimensional health assessment questionnaire scanned in the EMR and in the assessment and plan section.  Rheumatoid Arthritis Disease Activity Measure used: RAPID3, reviewed  today and scanned in the chart.        ALLERGIES:Penicillins    PAST MEDICAL/ACTIVE PROBLEMS/MEDICATION/SOCIAL DATA  Past Medical History:   Diagnosis Date     H/O small bowel obstruction      Rheumatoid arthritis      History   Smoking Status     Former Smoker   Smokeless Tobacco     Never Used     Patient Active Problem List   Diagnosis     Osteoarthritis Of The Knee     Ulnar neuropathy     High risk medication use     Health care maintenance     Seropositive rheumatoid arthritis of multiple sites     Cyclic citrullinated peptide (CCP) antibody positive     Current Outpatient Prescriptions   Medication Sig Dispense Refill     CALCIUM CARBONATE/VITAMIN D3 (CALCIUM+D ORAL) Take 1 tablet by mouth daily.       estrogens, conjugated, (PREMARIN) 0.625 MG tablet Take 0.625 mg by mouth daily.       hydroxychloroquine (PLAQUENIL) 200 mg tablet TAKE 1 TABLET BY MOUTH TWICE DAILY WITH FOOD 180 tablet 0     multivitamin (ONE A DAY) per tablet Take 1 tablet by mouth daily.       No current facility-administered medications for this visit.        DETAILED EXAMINATION  01/23/18  :  Vitals:    01/23/18 1557   BP: 122/78   Weight: 179 lb 3.2 oz (81.3 kg)   Height: 5' 8\" (1.727 m)     Alert oriented. Head including the face is examined for malar rash, heliotropes, scarring, lupus pernio. Eyes examined for redness such as in episcleritis/scleritis, periorbital lesions.   Neck/ Face examined for parotid gland swelling, range of motion of neck.  Left upper and lower and right upper and lower extremities examined for " tenderness, swelling, warmth of the appendicular joints, range of motion, edema, rash.  Some of the important findings included: She does not have synovitis in any of the palpable appendicular joints.  She has a scar on the left wrist dorsum off.  No JLT of the knees.     LAB / IMAGING DATA:  ALT   Date Value Ref Range Status   07/21/2017 16 0 - 45 U/L Final   12/23/2016 17 0 - 45 U/L Final   09/30/2016 10 0 - 45 U/L Final     Albumin   Date Value Ref Range Status   07/21/2017 3.6 3.5 - 5.0 g/dL Final   12/23/2016 3.9 3.5 - 5.0 g/dL Final   09/30/2016 3.8 3.5 - 5.0 g/dL Final     Creatinine   Date Value Ref Range Status   07/21/2017 0.83 0.60 - 1.10 mg/dL Final   12/23/2016 0.81 0.60 - 1.10 mg/dL Final   09/30/2016 0.73 0.60 - 1.10 mg/dL Final       WBC   Date Value Ref Range Status   07/21/2017 6.7 4.0 - 11.0 thou/uL Final   12/23/2016 6.0 4.0 - 11.0 thou/uL Final   08/06/2015 6.8 4.0 - 11.0 thou/uL Final   06/08/2015 7.5 4.0 - 11.0 thou/uL Final     Hemoglobin   Date Value Ref Range Status   07/21/2017 11.6 (L) 12.0 - 16.0 g/dL Final   12/23/2016 11.9 (L) 12.0 - 16.0 g/dL Final   09/30/2016 12.4 12.0 - 16.0 g/dL Final     Platelets   Date Value Ref Range Status   07/21/2017 318 140 - 440 thou/uL Final   12/23/2016 274 140 - 440 thou/uL Final   09/30/2016 280 140 - 440 thou/uL Final       Lab Results   Component Value Date    RF 17 01/14/2011    SEDRATE 28 (H) 01/31/2011

## 2021-06-16 PROBLEM — C50.112 MALIGNANT NEOPLASM OF CENTRAL PORTION OF LEFT BREAST IN FEMALE, ESTROGEN RECEPTOR POSITIVE (H): Status: ACTIVE | Noted: 2020-09-16

## 2021-06-16 PROBLEM — Z17.0 MALIGNANT NEOPLASM OF CENTRAL PORTION OF LEFT BREAST IN FEMALE, ESTROGEN RECEPTOR POSITIVE (H): Status: ACTIVE | Noted: 2020-09-16

## 2021-06-16 PROBLEM — M70.62 TROCHANTERIC BURSITIS, LEFT HIP: Status: ACTIVE | Noted: 2018-08-28

## 2021-06-16 PROBLEM — R76.8 CYCLIC CITRULLINATED PEPTIDE (CCP) ANTIBODY POSITIVE: Status: ACTIVE | Noted: 2017-07-26

## 2021-06-16 PROBLEM — Z90.710 S/P TOTAL HYSTERECTOMY: Status: ACTIVE | Noted: 2018-06-29

## 2021-06-16 PROBLEM — M62.830 LUMBAR PARASPINAL MUSCLE SPASM: Status: ACTIVE | Noted: 2018-09-17

## 2021-06-16 PROBLEM — Z78.0 MENOPAUSE: Status: ACTIVE | Noted: 2018-06-29

## 2021-06-16 NOTE — TELEPHONE ENCOUNTER
Refill request from [facility] [place]    Medication: Hydroxychloroquine 200mg  Last Refill: 1/4/21  Last OV: 9/8/20  Last lab: 8/31/20  No future appt

## 2021-06-16 NOTE — PROGRESS NOTES
Elza Polk is a 65 y.o. female who is being evaluated via a billable video visit.      How would you like to obtain your AVS? MyChart.  If dropped from the video visit, the video invitation should be resent by: [phone]  Will anyone else be joining your video visit? No      Video Start Time: 2:34 PM  Video-Visit Details    Type of service:  Video Visit    Video End Time (time video stopped): 2:40 PM  Originating Location (pt. Location): Home    [facility]     Platform used for Video Visit: Mindie     This document was created using a software with less than 100% fidelity, at times resulting in unintended, even erroneous syntax and grammar.  The reader is advised to keep this under consideration while reviewing, interpreting this note.           ASSESSMENT AND PLAN:    Diagnoses and all orders for this visit:    Seropositive rheumatoid arthritis of multiple sites (H)  -     hydrOXYchloroQUINE (PLAQUENIL) 200 mg tablet; TAKE 1 TABLET(200 MG) BY MOUTH TWICE DAILY WITH MEALS  Dispense: 180 tablet; Refill: 0    Cyclic citrullinated peptide (CCP) antibody positive    High risk medication use    Osteoarthritis Of The Knee        Follow up in 6 months      HISTORY OF PRESENTING ILLNESS:  Elza Polk 65 y.o. is evaluated here via video/audio link. She is here for follow-up.  This is for rheumatoid arthritis.  This is gone on for several years.  This is seropositive (CCP antibody.  She is done remarkably well hydroxychloroquine.  Since her previous visit she has had breast cancer that is been addressed.  She has no residual issues there.  She has had eye examination coming up in August.  She will come in for labs.  Follow-up in 6 months.  Continue hydroxychloroquine.  Osteoarthritis such as the knees management principles reviewed.     ROS enquiry held for fever, ocular symptoms, rash, headache,  GI issues.  Today we also discussed the  issues related to the current pandemic, the pros and cons of the current treatment plan, the CDC guidelines such as social distancing washing the hands covering the cough.  ALLERGIES:Penicillins and Penicillin    PAST MEDICAL/ACTIVE PROBLEMS/MEDICATION/SOCIAL DATA  Past Medical History:   Diagnosis Date     H/O small bowel obstruction      Rheumatoid arthritis (H)      Social History     Tobacco Use   Smoking Status Former Smoker   Smokeless Tobacco Never Used     Patient Active Problem List   Diagnosis     Osteoarthritis Of The Knee     Ulnar neuropathy     High risk medication use     Health care maintenance     Seropositive rheumatoid arthritis of multiple sites (H)     Cyclic citrullinated peptide (CCP) antibody positive     Menopause     S/P total hysterectomy     Trochanteric bursitis, left hip     Lumbar paraspinal muscle spasm     Malignant neoplasm of central portion of left breast in female, estrogen receptor positive (H)     Current Outpatient Medications   Medication Sig Dispense Refill     anastrozole (ARIMIDEX) 1 mg tablet Take 1 mg by mouth daily.       CALCIUM CARBONATE/VITAMIN D3 (CALCIUM+D ORAL) Take 1 tablet by mouth daily.       hydrOXYchloroQUINE (PLAQUENIL) 200 mg tablet TAKE 1 TABLET(200 MG) BY MOUTH TWICE DAILY WITH MEALS 180 tablet 0     multivitamin (ONE A DAY) per tablet Take 1 tablet by mouth daily.       HYDROcodone-acetaminophen 5-325 mg per tablet Take 1 tablet by mouth every 4 (four) hours as needed for pain. 25 tablet 0     No current facility-administered medications for this visit.          EXAMINATION:    Using the audio and video link as best as possible the constitutional, neck, neurologic, psych, skin, both upper extremities areas/organ system were evaluated during this assessment.  Some of the important findings: Alert, oriented, speech fluent.    Able to fully flex the digits, into fists bilaterally, wrist and elbow range of motion appear normal, abduction of the shoulder is  normal.      LAB / IMAGING DATA:  ALT   Date Value Ref Range Status   08/31/2020 19 0 - 45 U/L Final   03/04/2020 20 0 - 45 U/L Final   08/27/2019 15 0 - 45 U/L Final     Albumin   Date Value Ref Range Status   08/31/2020 3.9 3.5 - 5.0 g/dL Final   03/04/2020 4.0 3.5 - 5.0 g/dL Final   08/27/2019 3.7 3.5 - 5.0 g/dL Final     Creatinine   Date Value Ref Range Status   08/31/2020 0.80 0.60 - 1.10 mg/dL Final   03/04/2020 0.72 0.60 - 1.10 mg/dL Final   08/27/2019 0.75 0.60 - 1.10 mg/dL Final       WBC   Date Value Ref Range Status   08/31/2020 6.0 4.0 - 11.0 thou/uL Final   03/04/2020 7.5 4.0 - 11.0 thou/uL Final   08/06/2015 6.8 4.0 - 11.0 thou/uL Final   06/08/2015 7.5 4.0 - 11.0 thou/uL Final     Hemoglobin   Date Value Ref Range Status   08/31/2020 12.6 12.0 - 16.0 g/dL Final   03/04/2020 12.7 12.0 - 16.0 g/dL Final   08/27/2019 12.3 12.0 - 16.0 g/dL Final     Platelets   Date Value Ref Range Status   08/31/2020 293 140 - 440 thou/uL Final   03/04/2020 263 140 - 440 thou/uL Final   08/27/2019 291 140 - 440 thou/uL Final       Lab Results   Component Value Date    RF 17 01/14/2011    SEDRATE 28 (H) 01/31/2011

## 2021-06-19 ENCOUNTER — HEALTH MAINTENANCE LETTER (OUTPATIENT)
Age: 66
End: 2021-06-19

## 2021-06-19 NOTE — PROGRESS NOTES
Assessment:      Healthy female exam.    1. Routine general medical examination at a health care facility  Comprehensive Metabolic Panel    Lipid Profile    HM1(CBC and Differential)    HM1 (CBC with Diff)    Vitamin D, Total (25-Hydroxy)   2. Screen for colon cancer  Ambulatory referral for Colonoscopy   3. Seropositive rheumatoid arthritis of multiple sites (H)     4. Menopause  estrogens, conjugated, (PREMARIN) 0.625 MG tablet   5. S/P total hysterectomy     6. Visit for screening mammogram  Mammo Screening Bilateral          Plan:      This is a 61 yo female here for physical exam:  1.  Health Maintenance - is s/p hysterectomy - not due for pap smear screening; due for mammogram - will refer; due for colonoscopy - referred  2.  Seropositive rheumatoid arthritis - sees rheumatology  3.  Menopause - takes estrogen therapy currently      Subjective:      Elza Polk is a 62 y.o. female who presents for an annual exam. The patient reports that there is not domestic violence in her life.     Here for physical  No specific concerns        Healthy Habits:   Regular Exercise: Yes  Sunscreen Use: Yes  Healthy Diet: Yes  Dental Visits Regularly: Yes  Seat Belt: Yes  Sexually active: No  Self Breast Exam Monthly:Yes  Hemoccults: No  Flex Sig: No  Colonoscopy: last done 10/2018        Immunization History   Administered Date(s) Administered     DT (pediatric) 07/01/1994, 05/03/2006     DTaP, 5 Pertussis 07/01/1994, 05/03/2006     Influenza, Seasonal, Inj PF IIV3 01/26/2010     Influenza, inj, historic,unspecified 01/11/2017     Influenza, seasonal,quad inj 6-35 mos 01/26/2010, 11/22/2010, 12/01/2011, 11/26/2014     Influenza,seasonal quad, PF, 36+MOS 12/23/2016     Influenza,seasonal, Inj IIV3 10/31/2003, 11/22/2010, 12/01/2011, 11/26/2014     Td, adult adsorbed, PF 05/03/2006     Td,adult,historic,unspecified 05/03/2006     Tdap 06/15/2015     Immunization status: reviewed.    No exam data present    Gynecologic  History  Patient's last menstrual period was 2008.  Contraception: post menopausal status  Last Pap: unknown. Results were: normal  Last mammogram: 2017. Results were: normal      OB History    Para Term  AB Living   2 2 2      SAB TAB Ectopic Multiple Live Births             # Outcome Date GA Lbr Carlos Alberto/2nd Weight Sex Delivery Anes PTL Lv   2 Term            1 Term                   Current Outpatient Prescriptions   Medication Sig Dispense Refill     CALCIUM CARBONATE/VITAMIN D3 (CALCIUM+D ORAL) Take 1 tablet by mouth daily.       estrogens, conjugated, (PREMARIN) 0.625 MG tablet Take 1 tablet (0.625 mg total) by mouth daily. 90 tablet 3     multivitamin (ONE A DAY) per tablet Take 1 tablet by mouth daily.       hydroxychloroquine (PLAQUENIL) 200 mg tablet TAKE 1 TABLET BY MOUTH TWICE DAILY WITH FOOD 180 tablet 0     No current facility-administered medications for this visit.      Past Medical History:   Diagnosis Date     H/O small bowel obstruction      Rheumatoid arthritis (H)      Past Surgical History:   Procedure Laterality Date     HYSTERECTOMY       OOPHORECTOMY       ID FREEING BOWEL ADHESION,ENTEROLYSIS      Description: Laparoscopic Lysis Of Intestinal Adhesions;  Recorded: 2008;     ID TOTAL ABDOM HYSTERECTOMY      Description: Total Abdominal Hysterectomy;  Proc Date: 2005;  Comments: for uterine fibroids and adhesions; ovaries are gone, too     Penicillins  Family History   Problem Relation Age of Onset     Prostate cancer Father      Hypertension Mother      Social History     Social History     Marital status:      Spouse name: N/A     Number of children: N/A     Years of education: N/A     Occupational History     Not on file.     Social History Main Topics     Smoking status: Former Smoker     Smokeless tobacco: Never Used     Alcohol use Yes      Comment: occasional      Drug use: Not on file     Sexual activity: Not on file     Other Topics Concern  "    Not on file     Social History Narrative       Review of Systems  Review of Systems     Pertinent positives as noted in HPI; otherwise 12 point ROS negative.        Objective:         Vitals:    06/29/18 0821   BP: 114/76   Pulse: 66   Resp: 14   Temp: 97.8  F (36.6  C)   TempSrc: Oral   SpO2: 96%   Weight: 181 lb (82.1 kg)   Height: 5' 8\" (1.727 m)     Body mass index is 27.52 kg/(m^2).    Physical  Physical Exam    EXAM:  /76 (Patient Site: Right Arm, Patient Position: Sitting, Cuff Size: Adult Regular)  Pulse 66  Temp 97.8  F (36.6  C) (Oral)   Resp 14  Ht 5' 8\" (1.727 m)  Wt 181 lb (82.1 kg)  LMP 12/30/2008  SpO2 96%  BMI 27.52 kg/m2   Gen:  NAD, appears well, well-hydrated  HEENT:  TMs nl, oropharynx benign, nasal mucosa nl, conjunctiva clear  Neck:  Supple, no adenopathy, no thyromegaly, no carotid bruits, no JVD  Lungs:  Clear to auscultation bilaterally  Breast exam:  No breast lumps, no skin changes, no nipple discharge, no axillary adenopathy  Cor:  RRR no murmur  Abd:  Soft, nontender, BS+, no masses, no guarding or rebound, no HSM  Extr:  Neg.  Neuro:  No asymmetry  Skin:  Warm/dry        "
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
## 2021-06-20 NOTE — PROGRESS NOTES
ASSESSMENT AND PLAN:  Elza Polk 63 y.o. female is here for follow-up.  She has seropositive rheumatoid arthritis with significant titers of anti-CCP antibody.  She is doing great simply with the hydroxychloroquine. She has noted pain in the left trochanteric area consistent with trochanteric bursitis.  We discussed options.  She would rather defer the corticosteroid injection.  She may consider physical therapy this is being arranged.  She has had eye examination.  We will meet in 6 months or sooner.        .    Diagnoses and all orders for this visit:    Seropositive rheumatoid arthritis of multiple sites (H)  -     hydroxychloroquine (PLAQUENIL) 200 mg tablet; Take 1 tablet (200 mg total) by mouth 2 (two) times a day with meals.  Dispense: 180 tablet; Refill: 0  -     Ambulatory referral to PT/OT    High risk medication use    Cyclic citrullinated peptide (CCP) antibody positive    Trochanteric bursitis, left hip  -     Ambulatory referral to PT/OT           HISTORY OF PRESENTING ILLNESS:  Elza Polk 63 y.o.  is here for followup of Rheumatoid Arthritis.  This is associated with anti-CCP antibody.  She continues doing great with hydroxychloroquine only.  Except pain in her hip area.  She rated that is mild with enough to keep her up at night sometimes.  There is no history of fall.  She noted pain level to be 2.5.  There is no radiation down the leg.  The right side does not trouble her the same way.  Rest of her joints doing great.  She has noted low back discomfort for which she is due to be seen her primary physician's office.  Her morning stiffness ranges between 10-20 minutes. there is no fever or weight loss blurry vision eye redness mouth also nausea there is no rash.  She had some cough she feels that this is likely due to respiratory tract infection which typically self-limited.  Joints affected include multiple joints. Her arthropathy presented a few years ago. Onset was gradual. Her  rheumatoid arthritis  symptoms are stable.Symptoms included joint pain, joint swelling and morning stiffness and were of moderate severity.Symptoms are made worse by: nothing. Symptoms are helped by current regimen of only hydroxychloroquine.  Patient denies associated nausea, new headache, nodules, oral ulcers, Raynaud's and seizures.  Overall disease activity:  stable. Limitation on activities as noted in the MDHAQ scanned in the EMR.  Further historical information, including ROS as noted in the multidimensional health assessment questionnaire scanned in the EMR and in the assessment and plan section.  Rheumatoid Arthritis Disease Activity Measure used: RAPID3, reviewed  today and scanned in the chart.        ALLERGIES:Penicillins    PAST MEDICAL/ACTIVE PROBLEMS/MEDICATION/SOCIAL DATA  Past Medical History:   Diagnosis Date     H/O small bowel obstruction      Rheumatoid arthritis (H)      History   Smoking Status     Former Smoker   Smokeless Tobacco     Never Used     Patient Active Problem List   Diagnosis     Osteoarthritis Of The Knee     Ulnar neuropathy     High risk medication use     Health care maintenance     Seropositive rheumatoid arthritis of multiple sites (H)     Cyclic citrullinated peptide (CCP) antibody positive     Menopause     S/P total hysterectomy     Current Outpatient Prescriptions   Medication Sig Dispense Refill     CALCIUM CARBONATE/VITAMIN D3 (CALCIUM+D ORAL) Take 1 tablet by mouth daily.       estrogens, conjugated, (PREMARIN) 0.625 MG tablet Take 1 tablet (0.625 mg total) by mouth daily. 90 tablet 3     hydroxychloroquine (PLAQUENIL) 200 mg tablet TAKE 1 TABLET BY MOUTH TWICE DAILY WITH FOOD 180 tablet 0     hydroxychloroquine (PLAQUENIL) 200 mg tablet TAKE 1 TABLET BY MOUTH TWICE DAILY WITH FOOD 180 tablet 0     multivitamin (ONE A DAY) per tablet Take 1 tablet by mouth daily.       No current facility-administered medications for this visit.        DETAILED EXAMINATION  08/28/18   :  Vitals:    08/28/18 1606   BP: 108/64   Patient Site: Right Arm   Patient Position: Sitting   Cuff Size: Adult Regular   Pulse: 84   Weight: 178 lb (80.7 kg)     Alert oriented. Head including the face is examined for malar rash, heliotropes, scarring, lupus pernio. Eyes examined for redness such as in episcleritis/scleritis, periorbital lesions.   Neck/ Face examined for parotid gland swelling, range of motion of neck.  Left upper and lower and right upper and lower extremities examined for tenderness, swelling, warmth of the appendicular joints, range of motion, edema, rash.  Some of the important findings included: She does not have synovitis in the palpable appendicular joints of the upper extremities, knees.  She has tenderness of the left trochanteric area.  There is no tenderness on the right corresponding side.  LAB / IMAGING DATA:  ALT   Date Value Ref Range Status   08/27/2018 20 0 - 45 U/L Final   06/29/2018 23 0 - 45 U/L Final   01/23/2018 16 0 - 45 U/L Final     Albumin   Date Value Ref Range Status   08/27/2018 3.9 3.5 - 5.0 g/dL Final   06/29/2018 3.9 3.5 - 5.0 g/dL Final   01/23/2018 3.8 3.5 - 5.0 g/dL Final     Creatinine   Date Value Ref Range Status   08/27/2018 0.77 0.60 - 1.10 mg/dL Final   06/29/2018 0.76 0.60 - 1.10 mg/dL Final   03/13/2018 0.79 0.60 - 1.10 mg/dL Final       WBC   Date Value Ref Range Status   08/27/2018 5.6 4.0 - 11.0 thou/uL Final   06/29/2018 5.9 4.0 - 11.0 thou/uL Final   08/06/2015 6.8 4.0 - 11.0 thou/uL Final   06/08/2015 7.5 4.0 - 11.0 thou/uL Final     Hemoglobin   Date Value Ref Range Status   08/27/2018 11.9 (L) 12.0 - 16.0 g/dL Final   06/29/2018 12.7 12.0 - 16.0 g/dL Final   03/13/2018 12.2 12.0 - 16.0 g/dL Final     Platelets   Date Value Ref Range Status   08/27/2018 268 140 - 440 thou/uL Final   06/29/2018 269 140 - 440 thou/uL Final   01/23/2018 314 140 - 440 thou/uL Final       Lab Results   Component Value Date    RF 17 01/14/2011    SEDRATE 28 (H)  01/31/2011

## 2021-06-23 ENCOUNTER — OFFICE VISIT - HEALTHEAST (OUTPATIENT)
Dept: FAMILY MEDICINE | Facility: CLINIC | Age: 66
End: 2021-06-23

## 2021-06-23 DIAGNOSIS — Z17.0 MALIGNANT NEOPLASM OF CENTRAL PORTION OF LEFT BREAST IN FEMALE, ESTROGEN RECEPTOR POSITIVE (H): ICD-10-CM

## 2021-06-23 DIAGNOSIS — M05.79 SEROPOSITIVE RHEUMATOID ARTHRITIS OF MULTIPLE SITES (H): ICD-10-CM

## 2021-06-23 DIAGNOSIS — Z00.00 ROUTINE GENERAL MEDICAL EXAMINATION AT A HEALTH CARE FACILITY: ICD-10-CM

## 2021-06-23 DIAGNOSIS — C50.112 MALIGNANT NEOPLASM OF CENTRAL PORTION OF LEFT BREAST IN FEMALE, ESTROGEN RECEPTOR POSITIVE (H): ICD-10-CM

## 2021-06-23 DIAGNOSIS — E55.9 VITAMIN D DEFICIENCY: ICD-10-CM

## 2021-06-23 DIAGNOSIS — Z79.899 HIGH RISK MEDICATION USE: ICD-10-CM

## 2021-06-23 DIAGNOSIS — N95.1 MENOPAUSAL SYNDROME (HOT FLASHES): ICD-10-CM

## 2021-06-23 DIAGNOSIS — Z13.220 LIPID SCREENING: ICD-10-CM

## 2021-06-23 LAB
ALBUMIN SERPL-MCNC: 4.1 G/DL (ref 3.5–5)
ALP SERPL-CCNC: 80 U/L (ref 45–120)
ALT SERPL W P-5'-P-CCNC: 23 U/L (ref 0–45)
ANION GAP SERPL CALCULATED.3IONS-SCNC: 11 MMOL/L (ref 5–18)
AST SERPL W P-5'-P-CCNC: 25 U/L (ref 0–40)
BILIRUB SERPL-MCNC: 0.6 MG/DL (ref 0–1)
BUN SERPL-MCNC: 15 MG/DL (ref 8–22)
CALCIUM SERPL-MCNC: 9.5 MG/DL (ref 8.5–10.5)
CHLORIDE BLD-SCNC: 106 MMOL/L (ref 98–107)
CHOLEST SERPL-MCNC: 201 MG/DL
CO2 SERPL-SCNC: 22 MMOL/L (ref 22–31)
CREAT SERPL-MCNC: 0.78 MG/DL (ref 0.6–1.1)
ERYTHROCYTE [DISTWIDTH] IN BLOOD BY AUTOMATED COUNT: 12.3 % (ref 11–14.5)
FASTING STATUS PATIENT QL REPORTED: YES
GFR SERPL CREATININE-BSD FRML MDRD: >60 ML/MIN/1.73M2
GLUCOSE BLD-MCNC: 89 MG/DL (ref 70–125)
HCT VFR BLD AUTO: 37.7 % (ref 35–47)
HDLC SERPL-MCNC: 63 MG/DL
HGB BLD-MCNC: 12.6 G/DL (ref 12–16)
LDLC SERPL CALC-MCNC: 119 MG/DL
MCH RBC QN AUTO: 29.7 PG (ref 27–34)
MCHC RBC AUTO-ENTMCNC: 33.4 G/DL (ref 32–36)
MCV RBC AUTO: 89 FL (ref 80–100)
PLATELET # BLD AUTO: 315 THOU/UL (ref 140–440)
PMV BLD AUTO: 9.7 FL (ref 7–10)
POTASSIUM BLD-SCNC: 4.3 MMOL/L (ref 3.5–5)
PROT SERPL-MCNC: 6.7 G/DL (ref 6–8)
RBC # BLD AUTO: 4.24 MILL/UL (ref 3.8–5.4)
SODIUM SERPL-SCNC: 139 MMOL/L (ref 136–145)
TRIGL SERPL-MCNC: 96 MG/DL
TSH SERPL DL<=0.005 MIU/L-ACNC: 1.34 UIU/ML (ref 0.3–5)
WBC: 6.3 THOU/UL (ref 4–11)

## 2021-06-23 ASSESSMENT — MIFFLIN-ST. JEOR: SCORE: 1482.35

## 2021-06-24 LAB — 25(OH)D3 SERPL-MCNC: 27.6 NG/ML (ref 30–80)
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
## 2021-06-27 NOTE — PROGRESS NOTES
Progress Notes by Deyanira Meraz MD at 8/30/2019  3:00 PM     Author: Deyanira Meraz MD Service: -- Author Type: Physician    Filed: 9/2/2019 12:37 AM Encounter Date: 8/30/2019 Status: Signed    : Deyanira Meraz MD (Physician)       FEMALE PREVENTATIVE EXAM    Assessment and Plan:     1. Routine general medical examination at a health care facility  Lipid Profile    Vitamin D, Total (25-Hydroxy)    Varicella Zoster, Recombinant Vaccine IM    CANCELED: Varicella Zoster, Live Vaccine SQ   2. Encounter for screening for HIV  HIV Antigen/Antibody Screening [place]   3. Vitamin D deficiency  Vitamin D, Total (25-Hydroxy)     This is a 65 yo female here for physical exam:  1.  Health Maintenance - due for cervix cancer screening before age 65 (has had hysterectomy but after discussion has agreed to 1 additional cytology screening); due for shingles vaccine - ordered; labs ordered; discussed recommendation for HIV screening - patient agreeable.  2.  Vitamin D deficiency - check Vitamin D level  3.  Patient sees rheumatology for her rheumatoid arthritis and does have some lab screening for this.       Next follow up:  Return in about 1 year (around 8/30/2020) for Annual physical.    Immunization Review  Adult Imm Review: discussed need for zoster vaccine  Not a smoker    I discussed the following with the patient:   Adult Healthy Living: Importance of regular exercise  Healthy nutrition  Getting adequate sleep  Stress management    I have had an Advance Directives discussion with the patient.    Subjective:   Chief Complaint: Elza Polk is an 64 y.o. female here for a preventative health visit.     HPI:    Has had trouble with iron count -   Does children's multivitamin - helps with constipation  Hit or miss to donate blood for [place]      Healthy Habits  Are you taking a daily aspirin? No  Do you typically exercising at least 40 min, 3-4 times per week?  Yes  Do  "you usually eat at least 4 servings of fruit and vegetables a day, include whole grains and fiber and avoid regularly eating high fat foods? Yes  Have you had an eye exam in the past two years? Yes  Do you see a dentist twice per year? Yes  Do you have any concerns regarding sleep? No    Safety Screen  If you own firearms, are they secured in a locked gun cabinet or with trigger locks? NO  Do you feel you are safe where you are living?: Yes (8/30/2019  3:25 PM)  Do you feel you are safe in your relationship(s)?: Yes (8/30/2019  3:25 PM)      Review of Systems:  Please see above.  The rest of the review of systems are negative for all systems.     Pap History:   Status post hysterectomy.  PAP still indicated. will get pap smear before age 65  Cancer Screening       Status Date      COLONOSCOPY Next Due 11/21/2019      Done 11/21/2018 COLONOSCOPY EXTERNAL RESULT     Patient has more history with this topic...    MAMMOGRAM Next Due 7/3/2020      Done 7/3/2018 MAMMO SCREENING BILATERAL     Patient has more history with this topic...    PAP SMEAR This plan is no longer active.           Patient Care Team:  Deyanira Meraz MD as PCP - General        History     Reviewed By Date/Time Sections Reviewed    Deyanira Meraz MD 8/30/2019  3:52 PM Medical, Surgical, Tobacco, Alcohol, Drug Use, Sexual Activity, Family    Michelle Rodríguez CMA 8/30/2019  3:30 PM Tobacco            Objective:   Vital Signs:   Visit Vitals  /68 (Patient Site: Right Arm, Patient Position: Sitting, Cuff Size: Adult Regular)   Pulse 85   Temp 97.7  F (36.5  C) (Oral)   Resp 16   Ht 5' 7.75\" (1.721 m)   Wt 188 lb 14.4 oz (85.7 kg)   LMP 12/30/2008   SpO2 98%   Breastfeeding? No   BMI 28.93 kg/m           PHYSICAL EXAM  EXAM:  /68 (Patient Site: Right Arm, Patient Position: Sitting, Cuff Size: Adult Regular)   Pulse 85   Temp 97.7  F (36.5  C) (Oral)   Resp 16   Ht 5' 7.75\" (1.721 m)   Wt 188 lb 14.4 oz (85.7 " kg)   LMP 12/30/2008   SpO2 98%   Breastfeeding? No   BMI 28.93 kg/m     Gen:  NAD, appears well, well-hydrated  HEENT:  TMs nl, oropharynx benign, nasal mucosa nl, conjunctiva clear  Neck:  Supple, no adenopathy, no thyromegaly, no carotid bruits, no JVD  Lungs:  Clear to auscultation bilaterally  Breast exam:  No breast lumps, no skin changes, no nipple discharge, no axillary adenopathy  Cor:  RRR no murmur  Abd:  Soft, nontender, BS+, no masses, no guarding or rebound, no HSM  Extr:  Neg.  Neuro:  No asymmetry, Nl motor tone/strength, nl sensation, reflexes =, gait nl, nl coordination, CN intact,   Skin:  Warm/dry        The ASCVD Risk score (Ariaottoniel CISNEROS Jr., et al., 2013) failed to calculate for the following reasons:    Unable to determine if patient is Non- African American         Medication List           Accurate as of 8/30/19 11:59 PM. If you have any questions, ask your nurse or doctor.               CONTINUE taking these medications    CALCIUM+D ORAL  INSTRUCTIONS:  Take 1 tablet by mouth daily.        hydroxychloroquine 200 mg tablet  Also known as:  PLAQUENIL  INSTRUCTIONS:  TAKE 1 TABLET(200 MG) BY MOUTH TWICE DAILY WITH MEALS        multivitamin per tablet  Also known as:  ONE A DAY  INSTRUCTIONS:  Take 1 tablet by mouth daily.        PREMARIN 0.625 MG tablet  INSTRUCTIONS:  TAKE 1 TABLET(0.625 MG TOTAL) BY MOUTH DAILY  Generic drug:  estrogens (conjugated)               Additional Screenings Completed Today:

## 2021-07-04 NOTE — PROGRESS NOTES
"Progress Notes by Deyanira Meraz MD at 6/23/2021  8:40 AM     Author: Deyanira Meraz MD Service: -- Author Type: Physician    Filed: 6/30/2021 10:52 PM Encounter Date: 6/23/2021 Status: Signed    : Deyanira Meraz MD (Physician)       FEMALE PREVENTATIVE EXAM    Assessment and Plan:     1. Routine general medical examination at a health care facility     2. High risk medication use     3. Seropositive rheumatoid arthritis of multiple sites (H)     4. Malignant neoplasm of central portion of left breast in female, estrogen receptor positive (H)  Comprehensive Metabolic Panel    HM2(CBC w/o Differential)   5. Vitamin D deficiency  Vitamin D, Total (25-Hydroxy)   6. Menopausal syndrome (hot flashes)  Thyroid Stimulating Hormone (TSH)   7. Lipid screening  Lipid [place] FASTING     This is a 64 yo female here for physical exam -   1.  Recent (last year) diagnosis of breast cancer - treated with [facility] - doing well currently - had radiation therapy - \"tired me out\", but doing well.  2.  Rheumatoid arthritis - on high risk medication - check labs   3.  Vitamin D deficiency - check levels  4.  Menopause symptoms/hot flashes - check TSH  5.  Lipid screening - check lipids    Patient has been advised of split billing requirements and indicates understanding: Yes        Next follow up:  Return in about 1 year (around 6/23/2022) for Annual physical.    Immunization Review  Adult Imm Review: reviewed  No tobacco use.    I discussed the following with the patient:   Adult Healthy Living: Importance of regular exercise  Healthy nutrition  Getting adequate sleep    I have had an Advance Directives discussion with the patient.    Subjective:   Chief Complaint: Elza Polk is an 65 y.o. female here for a preventative health visit.    Patient has been advised of split billing requirements and indicates understanding: Yes  HPI:      Off work for the summer -   Done " "with treatment -   No particular concerns  Sees oncologist on Friday - thinks due to medication - Arimidex - rash on forearms/shins - also has poor sleep on this medication ;    Had Prevnar 1/6/2021 - due for 23 in 1 year - will wait until age 67    Had allergy to PCN - but was tested at [place] - no longer allergic      Healthy Habits  Are you taking a daily aspirin? No  Do you typically exercising at least 40 min, 3-4 times per week?  Yes  Do you usually eat at least 4 servings of fruit and vegetables a day, include whole grains and fiber and avoid regularly eating high fat foods? Yes  Have you had an eye exam in the past two years? Yes  Do you see a dentist twice per year? Yes  Do you have any concerns regarding sleep? YES. Side affect from Rx    Safety Screen  If you own firearms, are they secured in a locked gun cabinet or with trigger locks? NO  Do you feel you are safe where you are living?: Yes (6/23/2021  8:35 AM)  Do you feel you are safe in your relationship(s)?: Yes (6/23/2021  8:35 AM)      Review of Systems:  Please see above.  The rest of the review of systems are negative for all systems.     Pap History:   last done 8/31/2020  Cancer Screening       Status Date      MAMMOGRAM Next Due 10/27/2022      Done 10/27/2020 Ext Proc: [facility] SCREENING MAMMOGRAPHY BI 2-VIEW BREAST INC CAD     Patient has more history with this topic...          Patient Care Team:  Deyanira Meraz MD as PCP - General  Deyanira Meraz MD as Assigned PCP        History     Reviewed By Date/Time Sections Reviewed    Deyanira Meraz MD 6/30/2021 10:48 PM Medical, Surgical, Tobacco, Alcohol, Drug Use, Sexual Activity, Family    Ragini Ty LPN 6/23/2021  8:36 AM Tobacco            Objective:   Vital Signs:   Visit Vitals  /64 (Patient Site: Right Arm, Patient Position: Sitting)   Pulse 73   Temp 98.1  F (36.7  C) (Oral)   Resp 16   Ht 5' 8.5\" (1.74 m)   Wt 192 lb (87.1 kg)   LMP 12/30/2008 " "  SpO2 98%   BMI 28.77 kg/m           PHYSICAL EXAM  EXAM:  /64 (Patient Site: Right Arm, Patient Position: Sitting)   Pulse 73   Temp 98.1  F (36.7  C) (Oral)   Resp 16   Ht 5' 8.5\" (1.74 m)   Wt 192 lb (87.1 kg)   LMP 12/30/2008   SpO2 98%   BMI 28.77 kg/m     Gen:  NAD, appears well, well-hydrated  HEENT:  TMs nl, oropharynx benign, nasal mucosa nl, conjunctiva clear  Neck:  Supple, no adenopathy, no thyromegaly, no carotid bruits, no JVD  Lungs:  Clear to auscultation bilaterally  Breast exam:  No breast lumps, no skin changes, no nipple discharge, no axillary adenopathy  Cor:  RRR no murmur  Abd:  Soft, nontender, BS+, no masses, no guarding or rebound, no HSM  Extr:  Neg.  Neuro:  No asymmetry  Skin:  Warm/dry        The ASCVD Risk score (Aria BLAYNE Arvizu., et al., 2013) failed to calculate for the following reasons:    Unable to determine if patient is Non- African American         Medication List          Accurate as of June 23, 2021 11:59 PM. If you have any questions, ask your nurse or doctor.            CONTINUE taking these medications    anastrozole 1 mg tablet  Also known as: ARIMIDEX  INSTRUCTIONS: Take 1 mg by mouth daily.        CALCIUM+D ORAL  INSTRUCTIONS: Take 1 tablet by mouth daily.        hydrOXYchloroQUINE 200 mg tablet  Also known as: PLAQUENIL  INSTRUCTIONS: TAKE 1 TABLET(200 MG) BY MOUTH TWICE DAILY WITH MEALS        multivitamin per tablet  Also known as: ONE A DAY  INSTRUCTIONS: Take 1 tablet by mouth daily.           STOP taking these medications    HYDROcodone-acetaminophen 5-325 mg per tablet  Stopped by: Deyanira Meraz MD            Additional Screenings Completed Today:          "

## 2021-07-05 ENCOUNTER — COMMUNICATION - HEALTHEAST (OUTPATIENT)
Dept: RHEUMATOLOGY | Facility: CLINIC | Age: 66
End: 2021-07-05

## 2021-07-05 DIAGNOSIS — M05.79 SEROPOSITIVE RHEUMATOID ARTHRITIS OF MULTIPLE SITES (H): ICD-10-CM

## 2021-07-05 RX ORDER — HYDROXYCHLOROQUINE SULFATE 200 MG/1
TABLET, FILM COATED ORAL
Qty: 180 TABLET | Refills: 1 | Status: SHIPPED | OUTPATIENT
Start: 2021-07-05 | End: 2021-10-11

## 2021-07-05 NOTE — TELEPHONE ENCOUNTER
Telephone Encounter by Gerhardt, Judy at 7/5/2021  7:28 AM     Author: Gerhardt, Judy Service: -- Author Type: --    Filed: 7/5/2021  7:28 AM Encounter Date: 7/5/2021 Status: Signed    : Gerhardt, Judy       Refill request from [facility] [address] [place], [place]    Medication: Hydroxychloroquine 200mg  Last Refill: 4/6/21  Last OV: 4/6/21  Last lab: 4/8/21  Future OV: 10/11/21

## 2021-07-06 VITALS
WEIGHT: 192 LBS | DIASTOLIC BLOOD PRESSURE: 64 MMHG | OXYGEN SATURATION: 98 % | RESPIRATION RATE: 16 BRPM | HEART RATE: 73 BPM | BODY MASS INDEX: 28.44 KG/M2 | TEMPERATURE: 98.1 F | SYSTOLIC BLOOD PRESSURE: 128 MMHG | HEIGHT: 69 IN

## 2021-07-13 ENCOUNTER — RECORDS - HEALTHEAST (OUTPATIENT)
Dept: ADMINISTRATIVE | Facility: CLINIC | Age: 66
End: 2021-07-13

## 2021-07-20 ENCOUNTER — TRANSFERRED RECORDS (OUTPATIENT)
Dept: HEALTH INFORMATION MANAGEMENT | Facility: CLINIC | Age: 66
End: 2021-07-20

## 2021-07-21 ENCOUNTER — RECORDS - HEALTHEAST (OUTPATIENT)
Dept: ADMINISTRATIVE | Facility: CLINIC | Age: 66
End: 2021-07-21

## 2021-08-18 ENCOUNTER — MYC MEDICAL ADVICE (OUTPATIENT)
Dept: FAMILY MEDICINE | Facility: CLINIC | Age: 66
End: 2021-08-18

## 2021-08-18 DIAGNOSIS — C50.112 MALIGNANT NEOPLASM OF CENTRAL PORTION OF LEFT BREAST IN FEMALE, ESTROGEN RECEPTOR POSITIVE (H): Primary | ICD-10-CM

## 2021-08-18 DIAGNOSIS — C50.912 LOBULAR CARCINOMA OF LEFT BREAST (H): ICD-10-CM

## 2021-08-18 DIAGNOSIS — Z17.0 MALIGNANT NEOPLASM OF CENTRAL PORTION OF LEFT BREAST IN FEMALE, ESTROGEN RECEPTOR POSITIVE (H): Primary | ICD-10-CM

## 2021-08-18 NOTE — TELEPHONE ENCOUNTER
Patients message for you...      I've had appts. and seen doctors for annual screenings.  However, haven't had annual mammogram.     Spoke with Radiology dept at [facility],  told my primary physician would need to put in orders.       Thanks in advance for your attention to this matter.

## 2021-10-09 ENCOUNTER — HEALTH MAINTENANCE LETTER (OUTPATIENT)
Age: 66
End: 2021-10-09

## 2021-10-11 ENCOUNTER — VIRTUAL VISIT (OUTPATIENT)
Dept: RHEUMATOLOGY | Facility: CLINIC | Age: 66
End: 2021-10-11
Payer: COMMERCIAL

## 2021-10-11 DIAGNOSIS — R76.8 CYCLIC CITRULLINATED PEPTIDE (CCP) ANTIBODY POSITIVE: ICD-10-CM

## 2021-10-11 DIAGNOSIS — M17.10 UNILATERAL PRIMARY OSTEOARTHRITIS, UNSPECIFIED KNEE: ICD-10-CM

## 2021-10-11 DIAGNOSIS — M05.79 SEROPOSITIVE RHEUMATOID ARTHRITIS OF MULTIPLE SITES (H): Primary | ICD-10-CM

## 2021-10-11 DIAGNOSIS — Z79.899 HIGH RISK MEDICATION USE: ICD-10-CM

## 2021-10-11 PROCEDURE — 99214 OFFICE O/P EST MOD 30 MIN: CPT | Mod: GT | Performed by: INTERNAL MEDICINE

## 2021-10-11 RX ORDER — HYDROXYCHLOROQUINE SULFATE 200 MG/1
TABLET, FILM COATED ORAL
Qty: 180 TABLET | Refills: 0 | Status: SHIPPED | OUTPATIENT
Start: 2021-10-11 | End: 2022-04-06

## 2021-10-11 NOTE — PROGRESS NOTES
Elza is a 66 year old who is being evaluated via a billable video visit.      How would you like to obtain your AVS? MyChart  If the video visit is dropped, the invitation should be resent by: Text to cell phone: [phone]   Will anyone else be joining your video visit? No      Video Start Time: 3:19 PM  Video-Visit Details    Type of service:  Video Visit    Video End Time:3:26 PM    Originating Location (pt. Location): Home    Distant Location (provider location):  [facility]     Platform used for Video Visit: [email or web address]    This document was created using a software with less than 100% fidelity, at times resulting in unintended, even erroneous syntax and grammar.  The reader is advised to keep this under consideration while reviewing, interpreting this note.           ASSESSMENT AND PLAN:    Diagnoses and all orders for this visit:  Seropositive rheumatoid arthritis of multiple sites (H)  Cyclic citrullinated peptide (CCP) antibody positive  High risk medication use  Osteoarthritis Of The Knee      Follow up in 6 months      HISTORY OF PRESENTING ILLNESS:  Elza Polk 66 year old is evaluated here via video/audio link.  for follow-up.  This is for rheumatoid arthritis.  This is gone on for several years.  This is seropositive (CCP antibody.  She is done remarkably well hydroxychloroquine.  Since her previous visit she has had breast cancer that is been addressed.  She has no residual issues there.  She has had eye examination coming up in August.  In the interim she had developed breast cancer.  She status post treatment for this.  She wonders if she could hold back or even stop hydroxychloroquine.  Pros and cons were outlined.  She would really like to see how she goes without it.  She is going to hold off for now.  We will meet here in 6 months or sooner.           ROS enquiry held for fever, ocular symptoms, rash, headache,  GI issues.  Today we also discussed the issues  related to the current pandemic, the pros and cons of the current treatment plan, the CDC guidelines such as social distancing washing the hands covering the cough.  ALLERGIES:Patient has no known allergies.    PAST MEDICAL/ACTIVE PROBLEMS/MEDICATION/SOCIAL DATA  Past Medical History:   Diagnosis Date     H/O small bowel obstruction      Rheumatoid arthritis (H)      History   Smoking Status     Former Smoker   Smokeless Tobacco     Never Used     Patient Active Problem List   Diagnosis     Osteoarthritis Of The Knee     Ulnar neuropathy     High risk medication use     Health care maintenance     Seropositive rheumatoid arthritis of multiple sites (H)     Cyclic citrullinated peptide (CCP) antibody positive     Menopause     S/P total hysterectomy     Trochanteric bursitis, left hip     Lumbar paraspinal muscle spasm     Malignant neoplasm of central portion of left breast in female, estrogen receptor positive (H)     Current Outpatient Medications   Medication Sig Dispense Refill     anastrozole (ARIMIDEX) 1 mg tablet [ANASTROZOLE (ARIMIDEX) 1 MG TABLET] Take 1 mg by mouth daily.       CALCIUM CARBONATE/VITAMIN D3 (CALCIUM+D ORAL) [CALCIUM CARBONATE/VITAMIN D3 (CALCIUM+D ORAL)] Take 1 tablet by mouth daily.       hydrOXYchloroQUINE (PLAQUENIL) 200 mg tablet [HYDROXYCHLOROQUINE (PLAQUENIL) 200 MG TABLET] TAKE 1 TABLET(200 MG) BY MOUTH TWICE DAILY WITH MEALS 180 tablet 1     hydrOXYchloroQUINE (PLAQUENIL) 200 mg tablet [HYDROXYCHLOROQUINE (PLAQUENIL) 200 MG TABLET] TAKE 1 TABLET(200 MG) BY MOUTH TWICE DAILY WITH MEALS 180 tablet 0     multivitamin (ONE A DAY) per tablet [MULTIVITAMIN (ONE A DAY) PER TABLET] Take 1 tablet by mouth daily.           EXAMINATION:     On the phone she sounded comfortable, alert, oriented, her speech was fluent.  Her memory recall appeared normal.  She did not sound like she was in pain or short of breath.    LAB / IMAGING DATA:  ALT   Date Value Ref Range Status   06/23/2021 23 0 - 45  U/L Final   04/08/2021 22 0 - 45 U/L Final   08/31/2020 19 0 - 45 U/L Final     Albumin   Date Value Ref Range Status   06/23/2021 4.1 3.5 - 5.0 g/dL Final   04/08/2021 3.8 3.5 - 5.0 g/dL Final   08/31/2020 3.9 3.5 - 5.0 g/dL Final       WBC   Date Value Ref Range Status   06/23/2021 6.3 4.0 - 11.0 thou/uL Final   04/08/2021 5.1 4.0 - 11.0 thou/uL Final     Hemoglobin   Date Value Ref Range Status   06/23/2021 12.6 12.0 - 16.0 g/dL Final   04/08/2021 12.3 12.0 - 16.0 g/dL Final   08/31/2020 12.6 12.0 - 16.0 g/dL Final     Platelet Count   Date Value Ref Range Status   06/23/2021 315 140 - 440 thou/uL Final   04/08/2021 275 140 - 440 thou/uL Final   08/31/2020 293 140 - 440 thou/uL Final       No results found for: [place]

## 2021-10-13 ENCOUNTER — ANCILLARY PROCEDURE (OUTPATIENT)
Dept: MAMMOGRAPHY | Facility: CLINIC | Age: 66
End: 2021-10-13
Attending: FAMILY MEDICINE
Payer: COMMERCIAL

## 2021-10-13 DIAGNOSIS — C50.112 MALIGNANT NEOPLASM OF CENTRAL PORTION OF LEFT BREAST IN FEMALE, ESTROGEN RECEPTOR POSITIVE (H): ICD-10-CM

## 2021-10-13 DIAGNOSIS — Z17.0 MALIGNANT NEOPLASM OF CENTRAL PORTION OF LEFT BREAST IN FEMALE, ESTROGEN RECEPTOR POSITIVE (H): ICD-10-CM

## 2021-10-13 DIAGNOSIS — C50.912 LOBULAR CARCINOMA OF LEFT BREAST (H): ICD-10-CM

## 2021-10-13 PROCEDURE — 77063 BREAST TOMOSYNTHESIS BI: CPT

## 2021-10-21 ENCOUNTER — IMMUNIZATION (OUTPATIENT)
Dept: NURSING | Facility: CLINIC | Age: 66
End: 2021-10-21
Payer: COMMERCIAL

## 2021-10-21 PROCEDURE — 90471 IMMUNIZATION ADMIN: CPT

## 2021-10-21 PROCEDURE — 0013A PR COVID VAC MODERNA 100 MCG/0.5 ML IM: CPT

## 2021-10-21 PROCEDURE — 90682 RIV4 VACC RECOMBINANT DNA IM: CPT

## 2021-10-21 PROCEDURE — 91301 PR COVID VAC MODERNA 100 MCG/0.5 ML IM: CPT

## 2021-11-11 ENCOUNTER — TRANSFERRED RECORDS (OUTPATIENT)
Dept: HEALTH INFORMATION MANAGEMENT | Facility: CLINIC | Age: 66
End: 2021-11-11
Payer: COMMERCIAL

## 2021-12-29 DIAGNOSIS — M05.79 SEROPOSITIVE RHEUMATOID ARTHRITIS OF MULTIPLE SITES (H): ICD-10-CM

## 2021-12-30 RX ORDER — HYDROXYCHLOROQUINE SULFATE 200 MG/1
TABLET, FILM COATED ORAL
Qty: 180 TABLET | Refills: 0 | OUTPATIENT
Start: 2021-12-30

## 2022-02-17 ENCOUNTER — TRANSFERRED RECORDS (OUTPATIENT)
Dept: HEALTH INFORMATION MANAGEMENT | Facility: CLINIC | Age: 67
End: 2022-02-17
Payer: COMMERCIAL

## 2022-03-30 ENCOUNTER — LAB (OUTPATIENT)
Dept: LAB | Facility: CLINIC | Age: 67
End: 2022-03-30
Payer: COMMERCIAL

## 2022-03-30 DIAGNOSIS — M05.79 SEROPOSITIVE RHEUMATOID ARTHRITIS OF MULTIPLE SITES (H): ICD-10-CM

## 2022-03-30 DIAGNOSIS — Z79.899 HIGH RISK MEDICATION USE: ICD-10-CM

## 2022-03-30 LAB
ALBUMIN SERPL-MCNC: 3.8 G/DL (ref 3.5–5)
ALT SERPL W P-5'-P-CCNC: 21 U/L (ref 0–45)
CREAT SERPL-MCNC: 0.77 MG/DL (ref 0.6–1.1)
ERYTHROCYTE [DISTWIDTH] IN BLOOD BY AUTOMATED COUNT: 11.8 % (ref 10–15)
GFR SERPL CREATININE-BSD FRML MDRD: 86 ML/MIN/1.73M2
HCT VFR BLD AUTO: 37 % (ref 35–47)
HGB BLD-MCNC: 12.3 G/DL (ref 11.7–15.7)
MCH RBC QN AUTO: 29.7 PG (ref 26.5–33)
MCHC RBC AUTO-ENTMCNC: 33.2 G/DL (ref 31.5–36.5)
MCV RBC AUTO: 89 FL (ref 78–100)
PLATELET # BLD AUTO: 282 10E3/UL (ref 150–450)
RBC # BLD AUTO: 4.14 10E6/UL (ref 3.8–5.2)
WBC # BLD AUTO: 5.9 10E3/UL (ref 4–11)

## 2022-03-30 PROCEDURE — 85027 COMPLETE CBC AUTOMATED: CPT

## 2022-03-30 PROCEDURE — 82565 ASSAY OF CREATININE: CPT

## 2022-03-30 PROCEDURE — 84460 ALANINE AMINO (ALT) (SGPT): CPT

## 2022-03-30 PROCEDURE — 82040 ASSAY OF SERUM ALBUMIN: CPT

## 2022-03-30 PROCEDURE — 36415 COLL VENOUS BLD VENIPUNCTURE: CPT

## 2022-04-06 ENCOUNTER — OFFICE VISIT (OUTPATIENT)
Dept: RHEUMATOLOGY | Facility: CLINIC | Age: 67
End: 2022-04-06
Payer: COMMERCIAL

## 2022-04-06 ENCOUNTER — IMMUNIZATION (OUTPATIENT)
Dept: NURSING | Facility: CLINIC | Age: 67
End: 2022-04-06
Payer: COMMERCIAL

## 2022-04-06 VITALS
DIASTOLIC BLOOD PRESSURE: 82 MMHG | SYSTOLIC BLOOD PRESSURE: 130 MMHG | BODY MASS INDEX: 29.67 KG/M2 | WEIGHT: 198 LBS | HEART RATE: 78 BPM

## 2022-04-06 DIAGNOSIS — M17.10 UNILATERAL PRIMARY OSTEOARTHRITIS, UNSPECIFIED KNEE: ICD-10-CM

## 2022-04-06 DIAGNOSIS — M05.79 SEROPOSITIVE RHEUMATOID ARTHRITIS OF MULTIPLE SITES (H): Primary | ICD-10-CM

## 2022-04-06 DIAGNOSIS — Z79.899 HIGH RISK MEDICATION USE: ICD-10-CM

## 2022-04-06 DIAGNOSIS — R76.8 CYCLIC CITRULLINATED PEPTIDE (CCP) ANTIBODY POSITIVE: ICD-10-CM

## 2022-04-06 PROCEDURE — 99214 OFFICE O/P EST MOD 30 MIN: CPT | Performed by: INTERNAL MEDICINE

## 2022-04-06 PROCEDURE — 91306 COVID-19,PF,MODERNA (18+ YRS BOOSTER .25ML): CPT

## 2022-04-06 PROCEDURE — 0064A COVID-19,PF,MODERNA (18+ YRS BOOSTER .25ML): CPT

## 2022-04-06 NOTE — PROGRESS NOTES
Rheumatology follow-up visit note     Elza is a 66 year old female presents today for follow-up.    Elza was seen today for recheck.    Diagnoses and all orders for this visit:    Seropositive rheumatoid arthritis of multiple sites (H)    Cyclic citrullinated peptide (CCP) antibody positive    High risk medication use    Osteoarthritis Of The Knee        Since her previous visit in October last year which is subsequent to breast cancer discovery, lumpectomy, radiation treatment, she had opted to stop hydroxychloroquine and has not had significant joint pains.  She feels that with and without hydroxychloroquine she remains active, without assistance stiffness in the morning or swelling of the joints.  This is remarkable.  She is going to follow-up here 1 more time in 12 months sooner if needed.    Follow up in 1 year.    HPI    Elza Polk is a 66 year old female is here for follow-up of rheumatoid arthritis.  This is gone on for several years.  This is seropositive CCP antibody.  On her previous visit she had indicated the desire to stop hydroxychloroquine.  Since then over the past 6 months she has not had a flareup of RA.  She is able do all her day-to-day activities without difficulty.  She has noted overall achiness 0.5/10 with no stiffness or swelling in her joints in the morning.  This is no different from what it was when she was on hydroxychloroquine.  As noted on her previous visit, she had developed breast cancer.  She status post lumpectomy and radiation for this.        DETAILED EXAMINATION  04/06/22  :    Vitals:    04/06/22 1055   BP: 130/82   Pulse: 78   Weight: 89.8 kg (198 lb)     Alert oriented. Head including the face is examined for malar rash, heliotropes, scarring, lupus pernio. Eyes examined for redness such as in episcleritis/scleritis, periorbital lesions.   Neck/ Face examined for parotid gland swelling, range of motion of neck.  Left upper and lower and right upper and  lower extremities examined for tenderness, swelling, warmth of the appendicular joints, range of motion, edema, rash.  Some of the important findings included: she does not have evidence of synovitis in the palpable joints of the upper extremities.  No significant deformities of the digits.  No [place] nodes.  Range of motion of the shoulders  show full abduction.  No JLT effusion or warmth of the knees.  she does not have dactylitis of the digits.     Patient Active Problem List    Diagnosis Date Noted     Malignant neoplasm of central portion of left breast in female, estrogen receptor positive (H) 09/16/2020     Priority: Medium     Added automatically from request for surgery [id number]         Lumbar paraspinal muscle spasm 09/17/2018     Priority: Medium     Trochanteric bursitis, left hip 08/28/2018     Priority: Medium     Menopause 06/29/2018     Priority: Medium     S/P total hysterectomy 06/29/2018     Priority: Medium     Done for fibroid         Cyclic citrullinated peptide (CCP) antibody positive 07/26/2017     Priority: Medium     Osteoarthritis Of The Knee      Priority: Medium     Created by Conversion  Replacement Utility updated for latest IMO load    Replacing diagnoses that were inactivated after the 10/1/2021 regulatory import.       Seropositive rheumatoid arthritis of multiple sites (H) 03/30/2016     Priority: Medium     Health care maintenance 02/04/2016     Priority: Medium     High risk medication use 10/05/2015     Priority: Medium     Ulnar neuropathy 06/15/2015     Priority: Medium     Past Surgical History:   Procedure Laterality Date     HYSTERECTOMY  2008     OOPHORECTOMY  2008     MA MASTECTOMY, PARTIAL Left 9/23/2020    Procedure: Left Lumpectomy after Wire Loclaization; Candor Lymph Node Biopsy;  Surgeon: Анна Coffey MD;  Location: [facility];  Service: General     US BIOPSY FINE NEEDLE ASPIRATION LYMPH NODE (BREAST) LEFT Left 9/3/2020     US BREAST CORE BIOPSY LEFT  Left 9/3/2020     US BREAST LOC W SENT NODE INJ LEFT Left 9/23/2020     [facility] FREEING BOWEL ADHESION,ENTEROLYSIS      Description: Laparoscopic Lysis Of Intestinal Adhesions;  Recorded: 08/22/2008;     [facility] TOTAL ABDOM HYSTERECTOMY      Description: Total Abdominal Hysterectomy;  Proc Date: 01/01/2005;  Comments: for uterine fibroids and adhesions; ovaries are gone, too      Past Medical History:   Diagnosis Date     H/O small bowel obstruction      Rheumatoid arthritis (H)      No Known Allergies  Current Outpatient Medications   Medication Sig Dispense Refill     anastrozole (ARIMIDEX) 1 mg tablet [ANASTROZOLE (ARIMIDEX) 1 MG TABLET] Take 1 mg by mouth daily.       CALCIUM CARBONATE/VITAMIN D3 (CALCIUM+D ORAL) [CALCIUM CARBONATE/VITAMIN D3 (CALCIUM+D ORAL)] Take 1 tablet by mouth daily.       multivitamin (ONE A DAY) per tablet [MULTIVITAMIN (ONE A DAY) PER TABLET] Take 1 tablet by mouth daily.       hydroxychloroquine (PLAQUENIL) 200 MG tablet [HYDROXYCHLOROQUINE (PLAQUENIL) 200 MG TABLET] TAKE 1 TABLET(200 MG) BY MOUTH TWICE DAILY WITH MEALS 180 tablet 0     family history includes Hypertension in her mother; Prostate Cancer in her father.  Social Connections: Not on file          WBC Count   Date Value Ref Range Status   03/30/2022 5.9 4.0 - 11.0 10e3/uL Final     RBC Count   Date Value Ref Range Status   03/30/2022 4.14 3.80 - 5.20 10e6/uL Final     Hemoglobin   Date Value Ref Range Status   03/30/2022 12.3 11.7 - 15.7 g/dL Final     Hematocrit   Date Value Ref Range Status   03/30/2022 37.0 35.0 - 47.0 % Final     MCV   Date Value Ref Range Status   03/30/2022 89 78 - 100 fL Final     MCH   Date Value Ref Range Status   03/30/2022 29.7 26.5 - 33.0 pg Final     Platelet Count   Date Value Ref Range Status   03/30/2022 282 150 - 450 10e3/uL Final     % Lymphocytes   Date Value Ref Range Status   08/31/2020 34 20 - 40 % Final     AST   Date Value Ref Range Status   06/23/2021 25 0 - 40 U/L Final     ALT   Date  Value Ref Range Status   03/30/2022 21 0 - 45 U/L Final     Albumin   Date Value Ref Range Status   03/30/2022 3.8 3.5 - 5.0 g/dL Final     Alkaline Phosphatase   Date Value Ref Range Status   06/23/2021 80 45 - 120 U/L Final     Creatinine   Date Value Ref Range Status   03/30/2022 0.77 0.60 - 1.10 mg/dL Final     GFR Estimate   Date Value Ref Range Status   03/30/2022 86 >60 mL/min/1.73m2 Final     Comment:     Effective December 21, 2021 eGFRcr in adults is calculated using the 2021 CKD-EPI creatinine equation which includes age and gender (Tsering et al., NEJM, DOI: 10.1056/NYDRok6687486)   06/23/2021 >60 >60 mL/min/1.73m2 Final     GFR Estimate If Black   Date Value Ref Range Status   06/23/2021 >60 >60 mL/min/1.73m2 Final     Creatinine Urine mg/dL (External)   Date Value Ref Range Status   03/13/2018 240.4 mg/dL Final

## 2022-06-15 ENCOUNTER — OFFICE VISIT (OUTPATIENT)
Dept: RHEUMATOLOGY | Facility: CLINIC | Age: 67
End: 2022-06-15
Payer: COMMERCIAL

## 2022-06-15 VITALS
WEIGHT: 198 LBS | DIASTOLIC BLOOD PRESSURE: 78 MMHG | HEIGHT: 69 IN | BODY MASS INDEX: 29.33 KG/M2 | SYSTOLIC BLOOD PRESSURE: 136 MMHG | HEART RATE: 92 BPM

## 2022-06-15 DIAGNOSIS — M05.79 SEROPOSITIVE RHEUMATOID ARTHRITIS OF MULTIPLE SITES (H): Primary | ICD-10-CM

## 2022-06-15 DIAGNOSIS — Z79.899 HIGH RISK MEDICATION USE: ICD-10-CM

## 2022-06-15 DIAGNOSIS — R76.8 CYCLIC CITRULLINATED PEPTIDE (CCP) ANTIBODY POSITIVE: ICD-10-CM

## 2022-06-15 DIAGNOSIS — M17.10 UNILATERAL PRIMARY OSTEOARTHRITIS, UNSPECIFIED KNEE: ICD-10-CM

## 2022-06-15 PROCEDURE — 99214 OFFICE O/P EST MOD 30 MIN: CPT | Performed by: INTERNAL MEDICINE

## 2022-06-15 RX ORDER — HYDROXYCHLOROQUINE SULFATE 200 MG/1
400 TABLET, FILM COATED ORAL DAILY
Qty: 180 TABLET | Refills: 0 | Status: SHIPPED | OUTPATIENT
Start: 2022-06-15 | End: 2022-08-30

## 2022-06-15 RX ORDER — HYDROXYCHLOROQUINE SULFATE 200 MG/1
400 TABLET, FILM COATED ORAL DAILY
COMMUNITY
End: 2022-06-15

## 2022-06-15 NOTE — PROGRESS NOTES
"      Rheumatology follow-up visit note     Elza is a 66 year old female presents today for follow-up.    Elza was seen today for recheck.    Diagnoses and all orders for this visit:    Seropositive rheumatoid arthritis of multiple sites (H)  -     hydroxychloroquine (PLAQUENIL) 200 MG tablet; Take 2 tablets (400 mg) by mouth daily for 90 days    Cyclic citrullinated peptide (CCP) antibody positive  -     hydroxychloroquine (PLAQUENIL) 200 MG tablet; Take 2 tablets (400 mg) by mouth daily for 90 days    High risk medication use    Osteoarthritis Of The Knee        This patient has features of palindromic rheumatoid arthritis after having stopped the hydroxychloroquine 6 weeks or so ago, she is doing to go back on hydroxychloroquine offered her prednisone she would like to defer.  We will meet here in 3 months.    Follow up in 3 months.    HPI    Elza Polk is a 66 year old female is here for follow-up.  She has rheumatoid arthritis.  In April during her visit she had indicated a desire to drop the remaining hydroxychloroquine.  Which she did.  A week and a half ago she started experiencing pain.  This is a migratory process.  It goes from 1 joint to the other such as the hands shoulders the knees, lasting a few hours to a day associated with swelling and then resolution of symptoms to spontaneously.  For example the other day she could not have \"lifted\" her left above her head.  Today she has more pain in her right knee.  She recalls that several years ago when she started following up.  That is how her RA had presented there. As noted on her previous visit, she had developed breast cancer.  She status post lumpectomy and radiation for this.    She has observed swelling status in the first MCP area right knee the wrists.  And again that too is intermittent.        DETAILED EXAMINATION  06/15/22  :    Vitals:    06/15/22 1447   BP: 136/78   BP Location: Right arm   Patient Position: Sitting   Cuff Size: " "Adult Regular   Pulse: 92   Weight: 89.8 kg (198 lb)   Height: 1.74 m (5' 8.5\")     Alert oriented. Head including the face is examined for malar rash, heliotropes, scarring, lupus pernio. Eyes examined for redness such as in episcleritis/scleritis, periorbital lesions.   Neck/ Face examined for parotid gland swelling, range of motion of neck.  Left upper and lower and right upper and lower extremities examined for tenderness, swelling, warmth of the appendicular joints, range of motion, edema, rash.  Some of the important findings included: she does not have evidence of synovitis in the palpable joints of the upper extremities.  No significant deformities of the digits.  Small Heber[place] nodes.  Range of motion of the shoulders  show full abduction.    she does not have dactylitis of the digits.  She has tiny effusion in the right knee not suitable for aspiration.     Patient Active Problem List    Diagnosis Date Noted     Malignant neoplasm of central portion of left breast in female, estrogen receptor positive (H) 09/16/2020     Priority: Medium     Added automatically from request for surgery [id number]         Lumbar paraspinal muscle spasm 09/17/2018     Priority: Medium     Trochanteric bursitis, left hip 08/28/2018     Priority: Medium     Menopause 06/29/2018     Priority: Medium     S/P total hysterectomy 06/29/2018     Priority: Medium     Done for fibroid         Cyclic citrullinated peptide (CCP) antibody positive 07/26/2017     Priority: Medium     Osteoarthritis Of The Knee      Priority: Medium     Created by Conversion  Replacement Utility updated for latest IMO load    Replacing diagnoses that were inactivated after the 10/1/2021 regulatory import.       Seropositive rheumatoid arthritis of multiple sites (H) 03/30/2016     Priority: Medium     Health care maintenance 02/04/2016     Priority: Medium     High risk medication use 10/05/2015     Priority: Medium     Ulnar neuropathy 06/15/2015     Priority: " Medium     Past Surgical History:   Procedure Laterality Date     HYSTERECTOMY  2008     OOPHORECTOMY  2008     [place] MASTECTOMY, PARTIAL Left 9/23/2020    Procedure: Left Lumpectomy after Wire Loclaization; [place] Lymph Node Biopsy;  Surgeon: Анна Coffey MD;  Location: [facility];  Service: General     US BIOPSY FINE NEEDLE ASPIRATION LYMPH NODE (BREAST) LEFT Left 9/3/2020     US BREAST CORE BIOPSY LEFT Left 9/3/2020     US BREAST LOC W SENT NODE INJ LEFT Left 9/23/2020     [facility] FREEING BOWEL ADHESION,ENTEROLYSIS      Description: Laparoscopic Lysis Of Intestinal Adhesions;  Recorded: 08/22/2008;     [facility] TOTAL ABDOM HYSTERECTOMY      Description: Total Abdominal Hysterectomy;  Proc Date: 01/01/2005;  Comments: for uterine fibroids and adhesions; ovaries are gone, too      Past Medical History:   Diagnosis Date     H/O small bowel obstruction      Rheumatoid arthritis (H)      No Known Allergies  Current Outpatient Medications   Medication Sig Dispense Refill     anastrozole (ARIMIDEX) 1 mg tablet [ANASTROZOLE (ARIMIDEX) 1 MG TABLET] Take 1 mg by mouth daily.       CALCIUM CARBONATE/VITAMIN D3 (CALCIUM+D ORAL) [CALCIUM CARBONATE/VITAMIN D3 (CALCIUM+D ORAL)] Take 1 tablet by mouth daily.       hydroxychloroquine (PLAQUENIL) 200 MG tablet Take 400 mg by mouth daily       multivitamin (ONE A DAY) per tablet [MULTIVITAMIN (ONE A DAY) PER TABLET] Take 1 tablet by mouth daily.       family history includes Hypertension in her mother; Prostate Cancer in her father.  Social Connections: Not on file          WBC Count   Date Value Ref Range Status   03/30/2022 5.9 4.0 - 11.0 10e3/uL Final     RBC Count   Date Value Ref Range Status   03/30/2022 4.14 3.80 - 5.20 10e6/uL Final     Hemoglobin   Date Value Ref Range Status   03/30/2022 12.3 11.7 - 15.7 g/dL Final     Hematocrit   Date Value Ref Range Status   03/30/2022 37.0 35.0 - 47.0 % Final     MCV   Date Value Ref Range Status   03/30/2022 89 78 - 100 fL Final      MCH   Date Value Ref Range Status   03/30/2022 29.7 26.5 - 33.0 pg Final     Platelet Count   Date Value Ref Range Status   03/30/2022 282 150 - 450 10e3/uL Final     % Lymphocytes   Date Value Ref Range Status   08/31/2020 34 20 - 40 % Final     AST   Date Value Ref Range Status   06/23/2021 25 0 - 40 U/L Final     ALT   Date Value Ref Range Status   03/30/2022 21 0 - 45 U/L Final     Albumin   Date Value Ref Range Status   03/30/2022 3.8 3.5 - 5.0 g/dL Final     Alkaline Phosphatase   Date Value Ref Range Status   06/23/2021 80 45 - 120 U/L Final     Creatinine   Date Value Ref Range Status   03/30/2022 0.77 0.60 - 1.10 mg/dL Final     GFR Estimate   Date Value Ref Range Status   03/30/2022 86 >60 mL/min/1.73m2 Final     Comment:     Effective December 21, 2021 eGFRcr in adults is calculated using the 2021 CKD-EPI creatinine equation which includes age and gender (Tsering et al., NEJM, DOI: 10.1056/YGBDxz6090075)   06/23/2021 >60 >60 mL/min/1.73m2 Final     GFR Estimate If Black   Date Value Ref Range Status   06/23/2021 >60 >60 mL/min/1.73m2 Final     Creatinine Urine mg/dL (External)   Date Value Ref Range Status   03/13/2018 240.4 mg/dL Final

## 2022-06-20 ENCOUNTER — TRANSFERRED RECORDS (OUTPATIENT)
Dept: HEALTH INFORMATION MANAGEMENT | Facility: CLINIC | Age: 67
End: 2022-06-20

## 2022-08-22 ASSESSMENT — ENCOUNTER SYMPTOMS
DIARRHEA: 0
PARESTHESIAS: 0
DIZZINESS: 0
ARTHRALGIAS: 1
EYE PAIN: 0
FREQUENCY: 1
CONSTIPATION: 0
SHORTNESS OF BREATH: 0
MYALGIAS: 0
COUGH: 0
BREAST MASS: 0
HEMATOCHEZIA: 0
FEVER: 0
HEADACHES: 0
HEMATURIA: 0
NERVOUS/ANXIOUS: 0
PALPITATIONS: 0
DYSURIA: 0
SORE THROAT: 0
HEARTBURN: 0
ABDOMINAL PAIN: 0
JOINT SWELLING: 1
CHILLS: 0
NAUSEA: 0
WEAKNESS: 0

## 2022-08-22 ASSESSMENT — ACTIVITIES OF DAILY LIVING (ADL): CURRENT_FUNCTION: NO ASSISTANCE NEEDED

## 2022-08-23 ENCOUNTER — OFFICE VISIT (OUTPATIENT)
Dept: FAMILY MEDICINE | Facility: CLINIC | Age: 67
End: 2022-08-23
Payer: COMMERCIAL

## 2022-08-23 VITALS
SYSTOLIC BLOOD PRESSURE: 124 MMHG | RESPIRATION RATE: 20 BRPM | BODY MASS INDEX: 30.08 KG/M2 | TEMPERATURE: 97 F | HEART RATE: 69 BPM | HEIGHT: 68 IN | DIASTOLIC BLOOD PRESSURE: 74 MMHG | WEIGHT: 198.5 LBS

## 2022-08-23 DIAGNOSIS — C50.112 MALIGNANT NEOPLASM OF CENTRAL PORTION OF LEFT BREAST IN FEMALE, ESTROGEN RECEPTOR POSITIVE (H): ICD-10-CM

## 2022-08-23 DIAGNOSIS — M05.79 SEROPOSITIVE RHEUMATOID ARTHRITIS OF MULTIPLE SITES (H): ICD-10-CM

## 2022-08-23 DIAGNOSIS — Z00.00 ADULT GENERAL MEDICAL EXAM: Primary | ICD-10-CM

## 2022-08-23 DIAGNOSIS — G56.01 CARPAL TUNNEL SYNDROME OF RIGHT WRIST: ICD-10-CM

## 2022-08-23 DIAGNOSIS — Z23 NEED FOR PNEUMOCOCCAL VACCINATION: ICD-10-CM

## 2022-08-23 DIAGNOSIS — E55.9 VITAMIN D DEFICIENCY: ICD-10-CM

## 2022-08-23 DIAGNOSIS — Z17.0 MALIGNANT NEOPLASM OF CENTRAL PORTION OF LEFT BREAST IN FEMALE, ESTROGEN RECEPTOR POSITIVE (H): ICD-10-CM

## 2022-08-23 PROBLEM — Z88.0 ALLERGY TO PENICILLIN: Status: ACTIVE | Noted: 2020-12-01

## 2022-08-23 LAB
ALBUMIN SERPL BCG-MCNC: 4.3 G/DL (ref 3.5–5.2)
ALP SERPL-CCNC: 80 U/L (ref 35–104)
ALT SERPL W P-5'-P-CCNC: 24 U/L (ref 10–35)
ANION GAP SERPL CALCULATED.3IONS-SCNC: 11 MMOL/L (ref 7–15)
AST SERPL W P-5'-P-CCNC: 31 U/L (ref 10–35)
BILIRUB SERPL-MCNC: 0.4 MG/DL
BUN SERPL-MCNC: 14.9 MG/DL (ref 8–23)
CALCIUM SERPL-MCNC: 9.5 MG/DL (ref 8.8–10.2)
CHLORIDE SERPL-SCNC: 105 MMOL/L (ref 98–107)
CREAT SERPL-MCNC: 0.73 MG/DL (ref 0.51–0.95)
DEPRECATED HCO3 PLAS-SCNC: 24 MMOL/L (ref 22–29)
ERYTHROCYTE [DISTWIDTH] IN BLOOD BY AUTOMATED COUNT: 13 % (ref 10–15)
GFR SERPL CREATININE-BSD FRML MDRD: >90 ML/MIN/1.73M2
GLUCOSE SERPL-MCNC: 100 MG/DL (ref 70–99)
HCT VFR BLD AUTO: 37.6 % (ref 35–47)
HGB BLD-MCNC: 12.3 G/DL (ref 11.7–15.7)
MCH RBC QN AUTO: 29.1 PG (ref 26.5–33)
MCHC RBC AUTO-ENTMCNC: 32.7 G/DL (ref 31.5–36.5)
MCV RBC AUTO: 89 FL (ref 78–100)
PLATELET # BLD AUTO: 301 10E3/UL (ref 150–450)
POTASSIUM SERPL-SCNC: 4.5 MMOL/L (ref 3.4–5.3)
PROT SERPL-MCNC: 7 G/DL (ref 6.4–8.3)
RBC # BLD AUTO: 4.23 10E6/UL (ref 3.8–5.2)
SODIUM SERPL-SCNC: 140 MMOL/L (ref 136–145)
WBC # BLD AUTO: 6.1 10E3/UL (ref 4–11)

## 2022-08-23 PROCEDURE — 99397 PER PM REEVAL EST PAT 65+ YR: CPT | Mod: 25 | Performed by: FAMILY MEDICINE

## 2022-08-23 PROCEDURE — 85027 COMPLETE CBC AUTOMATED: CPT | Performed by: FAMILY MEDICINE

## 2022-08-23 PROCEDURE — 90677 PCV20 VACCINE IM: CPT | Performed by: FAMILY MEDICINE

## 2022-08-23 PROCEDURE — 80053 COMPREHEN METABOLIC PANEL: CPT | Performed by: FAMILY MEDICINE

## 2022-08-23 PROCEDURE — 82306 VITAMIN D 25 HYDROXY: CPT | Performed by: FAMILY MEDICINE

## 2022-08-23 PROCEDURE — 90471 IMMUNIZATION ADMIN: CPT | Performed by: FAMILY MEDICINE

## 2022-08-23 PROCEDURE — 36415 COLL VENOUS BLD VENIPUNCTURE: CPT | Performed by: FAMILY MEDICINE

## 2022-08-23 ASSESSMENT — ENCOUNTER SYMPTOMS
DIZZINESS: 0
ARTHRALGIAS: 1
MYALGIAS: 0
ABDOMINAL PAIN: 0
BREAST MASS: 0
HEMATOCHEZIA: 0
FREQUENCY: 1
SHORTNESS OF BREATH: 0
PARESTHESIAS: 0
JOINT SWELLING: 1
CONSTIPATION: 0
CHILLS: 0
HEMATURIA: 0
NERVOUS/ANXIOUS: 0
DYSURIA: 0
HEADACHES: 0
COUGH: 0
PALPITATIONS: 0
HEARTBURN: 0
NAUSEA: 0
EYE PAIN: 0
FEVER: 0
DIARRHEA: 0
WEAKNESS: 0
SORE THROAT: 0

## 2022-08-23 ASSESSMENT — ACTIVITIES OF DAILY LIVING (ADL): CURRENT_FUNCTION: NO ASSISTANCE NEEDED

## 2022-08-23 NOTE — PROGRESS NOTES
"SUBJECTIVE:   Elza Polk is a 67 year old female who presents for Preventive Visit.        Are you in the first 12 months of your Medicare coverage?  Yes,  Visual Acuity:  Right Eye: 10/16   Left/ Eye: 10/16  Both Eyes: 10/16    Back to work today   Taking medications:  Reviewed list  Exercise 3x/week - at a gym - does a  Boot camp        Healthy Habits:     In general, how would you rate your overall health?  Good    Frequency of exercise:  2-3 days/week    Duration of exercise:  45-60 minutes    Do you usually eat at least 4 servings of fruit and vegetables a day, include whole grains    & fiber and avoid regularly eating high fat or \"junk\" foods?  Yes    Taking medications regularly:  Yes    Medication side effects:  None    Ability to successfully perform activities of daily living:  No assistance needed    Home Safety:  No safety concerns identified    Hearing Impairment:  No hearing concerns    In the past 6 months, have you been bothered by leaking of urine?  No    In general, how would you rate your overall mental or emotional health?  Good      PHQ-2 Total Score: 0    Additional concerns today:  Yes    Do you feel safe in your environment? Yes    Have you ever done Advance Care Planning? (For example, a Health Directive, POLST, or a discussion with a medical provider or your loved ones about your wishes): No, advance care planning information given to patient to review.  Patient plans to discuss their wishes with loved ones or provider.         Fall risk  Fallen 2 or more times in the past year?: No  Any fall with injury in the past year?: No    Cognitive Screening   1) Repeat 3 items (Leader, Season, Table)    2) Clock draw: NORMAL  3) 3 item recall: Recalls 3 objects  Results: 3 items recalled: COGNITIVE IMPAIRMENT LESS LIKELY    Mini-CogTM Copyright [employer]. Licensed by the author for use in [employer]; reprinted with permission (lloyd@.[facility]). All rights reserved.  "         Reviewed and updated as needed this visit by clinical staff   Tobacco  Allergies  Meds  Problems  Med Hx  Surg Hx  Fam Hx            Reviewed and updated as needed this visit by Provider   Tobacco  Allergies  Meds  Problems  Med Hx  Surg Hx  Fam Hx           Social History     Tobacco Use     Smoking status: Former Smoker     Smokeless tobacco: Never Used   Substance Use Topics     Alcohol use: Yes     Comment: Alcoholic Drinks/day: occasional      If you drink alcohol do you typically have >3 drinks per day or >7 drinks per week? No    No flowsheet data found.        Current providers sharing in care for this patient include:   Patient Care Team:  Deyanira Meraz MD as PCP - General  Deyanira Meraz MD as Assigned PCP  Grace Pryor MBBS as Assigned Rheumatology Provider    The following health maintenance items are reviewed in Epic and correct as of today:  Health Maintenance Due   Topic Date Due     LUNG CANCER SCREENING  Never done     INFLUENZA VACCINE (1) 09/01/2022     BP Readings from Last 3 Encounters:   08/23/22 124/74   06/15/22 136/78   04/06/22 130/82    Wt Readings from Last 3 Encounters:   08/23/22 90 kg (198 lb 8 oz)   06/15/22 89.8 kg (198 lb)   04/06/22 89.8 kg (198 lb)                  Patient Active Problem List   Diagnosis     Osteoarthritis Of The Knee     Ulnar neuropathy     High risk medication use     Health care maintenance     Seropositive rheumatoid arthritis of multiple sites (H)     Cyclic citrullinated peptide (CCP) antibody positive     Menopause     S/P total hysterectomy     Trochanteric bursitis, left hip     Lumbar paraspinal muscle spasm     Malignant neoplasm of central portion of left breast in female, estrogen receptor positive (H)     Allergy to penicillin     Past Surgical History:   Procedure Laterality Date     HYSTERECTOMY  2008     OOPHORECTOMY  2008     [place] MASTECTOMY, PARTIAL Left 9/23/2020    Procedure: Left  Lumpectomy after Wire Loclaization; [place] Lymph Node Biopsy;  Surgeon: Анна Coffey MD;  Location: [facility];  Service: General     US BIOPSY FINE NEEDLE ASPIRATION LYMPH NODE (BREAST) LEFT Left 9/3/2020     US BREAST CORE BIOPSY LEFT Left 9/3/2020      BREAST LOC W SENT NODE INJ LEFT Left 9/23/2020     [facility] FREEING BOWEL ADHESION,ENTEROLYSIS      Description: Laparoscopic Lysis Of Intestinal Adhesions;  Recorded: 08/22/2008;     [facility] TOTAL ABDOM HYSTERECTOMY      Description: Total Abdominal Hysterectomy;  Proc Date: 01/01/2005;  Comments: for uterine fibroids and adhesions; ovaries are gone, too       Social History     Tobacco Use     Smoking status: Former Smoker     Smokeless tobacco: Never Used   Substance Use Topics     Alcohol use: Yes     Comment: Alcoholic Drinks/day: occasional      Family History   Problem Relation Age of Onset     Prostate Cancer Father      Coronary Artery Disease Father      Hypertension Father      Hypertension Mother      Diabetes Mother      Cerebrovascular Disease Mother      Breast Cancer Cousin      Other Cancer Cousin      Anxiety Disorder Son      Anxiety Disorder Son      Mental Illness Sister          Current Outpatient Medications   Medication Sig Dispense Refill     anastrozole (ARIMIDEX) 1 mg tablet [ANASTROZOLE (ARIMIDEX) 1 MG TABLET] Take 1 mg by mouth daily.       CALCIUM CARBONATE/VITAMIN D3 (CALCIUM+D ORAL) [CALCIUM CARBONATE/VITAMIN D3 (CALCIUM+D ORAL)] Take 1 tablet by mouth daily.       hydroxychloroquine (PLAQUENIL) 200 MG tablet Take 2 tablets (400 mg) by mouth daily for 90 days 180 tablet 0     multivitamin (ONE A DAY) per tablet [MULTIVITAMIN (ONE A DAY) PER TABLET] Take 1 tablet by mouth daily.       No Known Allergies  Pneumonia Vaccine:will do Pneumococcal 20 today   Mammogram Screening: monitoring post breast cancer diagnosis - managed by oncology  Last 3 Pap and HPV Results:   PAP / HPV Latest Ref Rng & Units 8/31/2020   PAP Negative for  squamous intraepithelial lesion or malignancy. Negative for squamous intraepithelial lesion or malignancy  Electronically signed by Shane Barker CT (ASCP) on 9/9/2020 at  2:09 PM     HPV16 NEG Negative   HPV18 NEG Negative   HRHPV NEG Negative       FHS-7:   Breast CA Risk Assessment (FHS-7) 10/13/2021 8/22/2022   Did any of your first-degree relatives have breast or ovarian cancer? No Yes   Did any of your relatives have bilateral breast cancer? No Unknown   Did any man in your family have breast cancer? No Unknown   Did any woman in your family have breast and ovarian cancer? No Yes   Did any woman in your family have breast cancer before age 50 y? No Unknown   Do you have 2 or more relatives with breast and/or ovarian cancer? No Yes   Do you have 2 or more relatives with breast and/or bowel cancer? No Yes       Mammogram Screening: Recommended mammography every 1-2 years with patient discussion and risk factor consideration  Pertinent mammograms are reviewed under the imaging tab.    Review of Systems   Constitutional: Negative for chills and fever.   HENT: Negative for congestion, ear pain, hearing loss and sore throat.    Eyes: Negative for pain and visual disturbance.   Respiratory: Negative for cough and shortness of breath.    Cardiovascular: Negative for chest pain, palpitations and peripheral edema.   Gastrointestinal: Negative for abdominal pain, constipation, diarrhea, heartburn, hematochezia and nausea.   Breasts:  Negative for tenderness, breast mass and discharge.   Genitourinary: Positive for frequency. Negative for dysuria, genital sores, hematuria, pelvic pain, urgency, vaginal bleeding and vaginal discharge.   Musculoskeletal: Positive for arthralgias and joint swelling. Negative for myalgias.   Skin: Negative for rash.   Neurological: Negative for dizziness, weakness, headaches and paresthesias.   Psychiatric/Behavioral: Negative for mood changes. The patient is not nervous/anxious.   "  All other systems reviewed and are negative.        OBJECTIVE:   /74 (BP Location: Right arm, Patient Position: Sitting, Cuff Size: Adult Regular)   Pulse 69   Temp 97  F (36.1  C)   Resp 20   Ht 1.719 m (5' 7.68\")   Wt 90 kg (198 lb 8 oz)   BMI 30.47 kg/m   Estimated body mass index is 30.47 kg/m  as calculated from the following:    Height as of this encounter: 1.719 m (5' 7.68\").    Weight as of this encounter: 90 kg (198 lb 8 oz).  Physical Exam  Vitals reviewed.   Constitutional:       General: She is not in acute distress.     Appearance: She is well-developed.   HENT:      Right Ear: Tympanic membrane and external ear normal.      Left Ear: Tympanic membrane and external ear normal.      Nose: Nose normal.      Mouth/Throat:      Pharynx: No oropharyngeal exudate.   Eyes:      General:         Right eye: No discharge.         Left eye: No discharge.      Conjunctiva/sclera: Conjunctivae normal.      Pupils: Pupils are equal, round, and reactive to light.   Neck:      Thyroid: No thyromegaly.      Trachea: No tracheal deviation.   Cardiovascular:      Rate and Rhythm: Normal rate and regular rhythm.      Pulses: Normal pulses.      Heart sounds: Normal heart sounds, S1 normal and S2 normal. No murmur heard.    No friction rub. No S3 or S4 sounds.   Pulmonary:      Effort: Pulmonary effort is normal. No respiratory distress.      Breath sounds: Normal breath sounds. No wheezing or rales.   Chest:   Breasts:      Right: No mass, nipple discharge or tenderness.      Left: No mass, nipple discharge or tenderness.       Abdominal:      General: Bowel sounds are normal.      Palpations: Abdomen is soft. There is no mass.      Tenderness: There is no abdominal tenderness.   Musculoskeletal:         General: Normal range of motion.      Cervical back: Neck supple.   Lymphadenopathy:      Cervical: No cervical adenopathy.   Skin:     General: Skin is warm and dry.      Findings: No rash.   Neurological: "      General: No focal deficit present.      Mental Status: She is alert and oriented to person, place, and time.      Motor: No abnormal muscle tone.      Deep Tendon Reflexes: Reflexes are normal and symmetric.   Psychiatric:         Mood and Affect: Mood normal.         Thought Content: Thought content normal.           Diagnostic Test Results:  Labs reviewed in Epic  Results for orders placed or performed in visit on 08/23/22   CBC with platelets     Status: Normal   Result Value Ref Range    WBC Count 6.1 4.0 - 11.0 10e3/uL    RBC Count 4.23 3.80 - 5.20 10e6/uL    Hemoglobin 12.3 11.7 - 15.7 g/dL    Hematocrit 37.6 35.0 - 47.0 %    MCV 89 78 - 100 fL    MCH 29.1 26.5 - 33.0 pg    MCHC 32.7 31.5 - 36.5 g/dL    RDW 13.0 10.0 - 15.0 %    Platelet Count 301 150 - 450 10e3/uL   Comprehensive metabolic panel (BMP + Alb, Alk Phos, ALT, AST, Total. Bili, TP)     Status: Abnormal   Result Value Ref Range    Sodium 140 136 - 145 mmol/L    Potassium 4.5 3.4 - 5.3 mmol/L    Creatinine 0.73 0.51 - 0.95 mg/dL    Urea Nitrogen 14.9 8.0 - 23.0 mg/dL    Chloride 105 98 - 107 mmol/L    Carbon Dioxide (CO2) 24 22 - 29 mmol/L    Anion Gap 11 7 - 15 mmol/L    Glucose 100 (H) 70 - 99 mg/dL    Calcium 9.5 8.8 - 10.2 mg/dL    Protein Total 7.0 6.4 - 8.3 g/dL    Albumin 4.3 3.5 - 5.2 g/dL    Bilirubin Total 0.4 <=1.2 mg/dL    Alkaline Phosphatase 80 35 - 104 U/L    AST 31 10 - 35 U/L    ALT 24 10 - 35 U/L    GFR Estimate >90 >60 mL/min/1.73m2   Vitamin D Deficiency     Status: Normal   Result Value Ref Range    Vitamin D, Total (25-Hydroxy) 32 20 - 75 ug/L    Narrative    Season, race, dietary intake, and treatment affect the concentration of 25-hydroxy-Vitamin D. Values may decrease during winter months and increase during summer months. Values 20-29 ug/L may indicate Vitamin D insufficiency and values <20 ug/L may indicate Vitamin D deficiency.    Vitamin D determination is routinely performed by an immunoassay specific for 25  "hydroxyvitamin D3.  If an individual is on vitamin D2(ergocalciferol) supplementation, please specify 25 OH vitamin D2 and D3 level determination by LCMSMS test [id number].         ASSESSMENT / PLAN:   1. Adult general medical exam  This is a 68 yo female here for physical exam     2. Malignant neoplasm of central portion of left breast in female, estrogen receptor positive (H)  Patient with h/o breast cancer - due for mammogram - will order  - MA Screen Bilateral w/Cel[place]; Future    3. Seropositive rheumatoid arthritis of multiple sites (H)  H/o rheumatoid arthritis - due for labs   - CBC with platelets; Future  - Comprehensive metabolic panel (BMP + Alb, Alk Phos, ALT, AST, Total. Bili, TP); Future  - CBC with platelets  - Comprehensive metabolic panel (BMP + Alb, Alk Phos, ALT, AST, Total. Bili, TP)    4. Vitamin D deficiency  H/o Vitamin D deficiency - check levels   - Vitamin D Deficiency; Future  - Vitamin D Deficiency    5. Carpal tunnel syndrome of right wrist  Has symptoms of carpal tunnel syndrome right wrist - wakes up with numbness/tingling in hand - will try short wrist splint   - Wrist/Arm/Hand Supplies Order for DME - ONLY FOR DME    6. Need for pneumococcal vaccination  Due for pneumococcal vaccine - discussed/recommended/ordered  - Pneumococcal 20 Valent Conjugate (PCV20)          COUNSELING:  Reviewed preventive health counseling, as reflected in patient instructions       Regular exercise       Healthy diet/nutrition    Estimated body mass index is 30.47 kg/m  as calculated from the following:    Height as of this encounter: 1.719 m (5' 7.68\").    Weight as of this encounter: 90 kg (198 lb 8 oz).        She reports that she has quit smoking. She has never used smokeless tobacco.      Appropriate preventive services were discussed with this patient, including applicable screening as appropriate for cardiovascular disease, diabetes, osteopenia/osteoporosis, and glaucoma.  As appropriate for age/gender, " discussed screening for colorectal cancer, prostate cancer, breast cancer, and cervical cancer. Checklist reviewing preventive services available has been given to the patient.    Reviewed patients plan of care and provided an AVS. The Basic Care Plan (routine screening as documented in Health Maintenance) for Elza meets the Care Plan requirement. This Care Plan has been established and reviewed with the Patient.    Counseling Resources:  ATP IV Guidelines  Pooled Cohorts Equation Calculator  Breast Cancer Risk Calculator  Breast Cancer: Medication to Reduce Risk  FRAX Risk Assessment  ICSI Preventive Guidelines  Dietary Guidelines for Americans, 2010  USDA's MyPlate  ASA Prophylaxis  Lung CA Screening    LUIS JACKMAN MD  [facility]    Identified Health Risks:

## 2022-08-24 LAB — DEPRECATED CALCIDIOL+CALCIFEROL SERPL-MC: 32 UG/L (ref 20–75)

## 2022-08-30 ENCOUNTER — OFFICE VISIT (OUTPATIENT)
Dept: RHEUMATOLOGY | Facility: CLINIC | Age: 67
End: 2022-08-30
Payer: COMMERCIAL

## 2022-08-30 VITALS
BODY MASS INDEX: 30.01 KG/M2 | HEIGHT: 68 IN | HEART RATE: 64 BPM | WEIGHT: 198 LBS | SYSTOLIC BLOOD PRESSURE: 138 MMHG | DIASTOLIC BLOOD PRESSURE: 86 MMHG

## 2022-08-30 DIAGNOSIS — M05.79 SEROPOSITIVE RHEUMATOID ARTHRITIS OF MULTIPLE SITES (H): Primary | ICD-10-CM

## 2022-08-30 DIAGNOSIS — M17.10 UNILATERAL PRIMARY OSTEOARTHRITIS, UNSPECIFIED KNEE: ICD-10-CM

## 2022-08-30 DIAGNOSIS — Z79.899 HIGH RISK MEDICATION USE: ICD-10-CM

## 2022-08-30 DIAGNOSIS — R76.8 CYCLIC CITRULLINATED PEPTIDE (CCP) ANTIBODY POSITIVE: ICD-10-CM

## 2022-08-30 PROCEDURE — 99214 OFFICE O/P EST MOD 30 MIN: CPT | Performed by: INTERNAL MEDICINE

## 2022-08-30 RX ORDER — HYDROXYCHLOROQUINE SULFATE 200 MG/1
400 TABLET, FILM COATED ORAL DAILY
Qty: 180 TABLET | Refills: 0 | Status: SHIPPED | OUTPATIENT
Start: 2022-08-30 | End: 2022-09-26

## 2022-08-30 NOTE — PROGRESS NOTES
"      Rheumatology follow-up visit note     Elza is a 67 year old female presents today for follow-up.    Elza was seen today for recheck.    Diagnoses and all orders for this visit:    Seropositive rheumatoid arthritis of multiple sites (H)  -     hydroxychloroquine (PLAQUENIL) 200 MG tablet; Take 2 tablets (400 mg) by mouth daily for 90 days    Cyclic citrullinated peptide (CCP) antibody positive  -     hydroxychloroquine (PLAQUENIL) 200 MG tablet; Take 2 tablets (400 mg) by mouth daily for 90 days    High risk medication use    Osteoarthritis Of The Knee        She is back on hydroxychloroquine for her rheumatoid arthritis, having been off it led to significant relapses.  She has tolerated this well.  Recent labs are within acceptable range.  She is going to stay the course.  We will meet again in 6 months.    Follow up in 6 months.    HPI    Elza Polk is a 67 year old female is here for follow-up of rheumatoid arthritis.  She has tolerated restarting the hydroxychloroquine very well.  No GI symptoms cutaneous issues.  She is aware of the eye examination as she had been in the past on it for years.  Recent labs are normal.  She is no longer having any further palindrome's of rheumatoid arthritis.  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  She did not take prednisone.      Following is the excerpt from a previous note: In April during her visit she had indicated a desire to drop the remaining hydroxychloroquine.  Which she did.  A week and a half ago she started experiencing pain.  This is a migratory process.  It goes from 1 joint to the other such as the hands shoulders the knees, lasting a few hours to a day associated with swelling and then resolution of symptoms to spontaneously.  For example the other day she could not have \"lifted\" her left above her head.  Today she has more pain in her right knee.  She recalls that several years ago when she started following up.  That " "is how her RA had presented there. As noted on her previous visit, she had developed breast cancer.  She status post lumpectomy and radiation for this.    She has observed swelling status in the first MCP area right knee the wrists.  And again that too is intermittent.        DETAILED EXAMINATION  08/30/22  :    Vitals:    08/30/22 1309   BP: 138/86   BP Location: Right arm   Patient Position: Sitting   Cuff Size: Adult Regular   Pulse: 64   Weight: 89.8 kg (198 lb)   Height: 1.719 m (5' 7.68\")     Alert oriented. Head including the face is examined for malar rash, heliotropes, scarring, lupus pernio. Eyes examined for redness such as in episcleritis/scleritis, periorbital lesions.   Neck/ Face examined for parotid gland swelling, range of motion of neck.  Left upper and lower and right upper and lower extremities examined for tenderness, swelling, warmth of the appendicular joints, range of motion, edema, rash.  Some of the important findings included: she does not have evidence of synovitis in the palpable joints of the upper extremities.  No significant deformities of the digits.  No Heberden nodes.  Range of motion of the shoulders  show full abduction.  No JLT effusion or warmth of the knees.  she does not have dactylitis of the digits.     Patient Active Problem List    Diagnosis Date Noted     Allergy to penicillin 12/01/2020     Priority: Medium     Malignant neoplasm of central portion of left breast in female, estrogen receptor positive (H) 09/16/2020     Priority: Medium     Added automatically from request for surgery [id number]         Lumbar paraspinal muscle spasm 09/17/2018     Priority: Medium     Trochanteric bursitis, left hip 08/28/2018     Priority: Medium     Menopause 06/29/2018     Priority: Medium     S/P total hysterectomy 06/29/2018     Priority: Medium     Done for fibroid         Cyclic citrullinated peptide (CCP) antibody positive 07/26/2017     Priority: Medium     Osteoarthritis Of The " Knee      Priority: Medium     Created by Conversion  Replacement Utility updated for latest IMO load    Replacing diagnoses that were inactivated after the 10/1/2021 regulatory import.       Seropositive rheumatoid arthritis of multiple sites (H) 03/30/2016     Priority: Medium     Health care maintenance 02/04/2016     Priority: Medium     High risk medication use 10/05/2015     Priority: Medium     Ulnar neuropathy 06/15/2015     Priority: Medium     Past Surgical History:   Procedure Laterality Date     HYSTERECTOMY  2008     OOPHORECTOMY  2008     [place] MASTECTOMY, PARTIAL Left 9/23/2020    Procedure: Left Lumpectomy after Wire Loclaization; [place] Lymph Node Biopsy;  Surgeon: Анна Coffey MD;  Location: [facility];  Service: General     US BIOPSY FINE NEEDLE ASPIRATION LYMPH NODE (BREAST) LEFT Left 9/3/2020     US BREAST CORE BIOPSY LEFT Left 9/3/2020     US BREAST LOC W SENT NODE INJ LEFT Left 9/23/2020     [facility] FREEING BOWEL ADHESION,ENTEROLYSIS      Description: Laparoscopic Lysis Of Intestinal Adhesions;  Recorded: 08/22/2008;     [facility] TOTAL ABDOM HYSTERECTOMY      Description: Total Abdominal Hysterectomy;  Proc Date: 01/01/2005;  Comments: for uterine fibroids and adhesions; ovaries are gone, too      Past Medical History:   Diagnosis Date     H/O small bowel obstruction      Rheumatoid arthritis (H)      No Known Allergies  Current Outpatient Medications   Medication Sig Dispense Refill     anastrozole (ARIMIDEX) 1 mg tablet [ANASTROZOLE (ARIMIDEX) 1 MG TABLET] Take 1 mg by mouth daily.       CALCIUM CARBONATE/VITAMIN D3 (CALCIUM+D ORAL) [CALCIUM CARBONATE/VITAMIN D3 (CALCIUM+D ORAL)] Take 1 tablet by mouth daily.       hydroxychloroquine (PLAQUENIL) 200 MG tablet Take 2 tablets (400 mg) by mouth daily for 90 days 180 tablet 0     multivitamin (ONE A DAY) per tablet [MULTIVITAMIN (ONE A DAY) PER TABLET] Take 1 tablet by mouth daily.       family history includes Anxiety Disorder in her son and  son; Breast Cancer in her cousin; Cerebrovascular Disease in her mother; Coronary Artery Disease in her father; Diabetes in her mother; Hypertension in her father and mother; Mental Illness in her sister; Other Cancer in her cousin; Prostate Cancer in her father.  Social Connections: Not on file          WBC Count   Date Value Ref Range Status   08/23/2022 6.1 4.0 - 11.0 10e3/uL Final     RBC Count   Date Value Ref Range Status   08/23/2022 4.23 3.80 - 5.20 10e6/uL Final     Hemoglobin   Date Value Ref Range Status   08/23/2022 12.3 11.7 - 15.7 g/dL Final     Hematocrit   Date Value Ref Range Status   08/23/2022 37.6 35.0 - 47.0 % Final     MCV   Date Value Ref Range Status   08/23/2022 89 78 - 100 fL Final     MCH   Date Value Ref Range Status   08/23/2022 29.1 26.5 - 33.0 pg Final     Platelet Count   Date Value Ref Range Status   08/23/2022 301 150 - 450 10e3/uL Final     % Lymphocytes   Date Value Ref Range Status   08/31/2020 34 20 - 40 % Final     AST   Date Value Ref Range Status   08/23/2022 31 10 - 35 U/L Final     ALT   Date Value Ref Range Status   08/23/2022 24 10 - 35 U/L Final     Albumin   Date Value Ref Range Status   08/23/2022 4.3 3.5 - 5.2 g/dL Final   03/30/2022 3.8 3.5 - 5.0 g/dL Final     Alkaline Phosphatase   Date Value Ref Range Status   08/23/2022 80 35 - 104 U/L Final     Creatinine   Date Value Ref Range Status   08/23/2022 0.73 0.51 - 0.95 mg/dL Final     GFR Estimate   Date Value Ref Range Status   08/23/2022 >90 >60 mL/min/1.73m2 Final     Comment:     Effective December 21, 2021 eGFRcr in adults is calculated using the 2021 CKD-EPI creatinine equation which includes age and gender (Tsering et al., NEJM, DOI: 10.1056/XVPDck4379533)   06/23/2021 >60 >60 mL/min/1.73m2 Final     GFR Estimate If Black   Date Value Ref Range Status   06/23/2021 >60 >60 mL/min/1.73m2 Final     Creatinine Urine mg/dL (External)   Date Value Ref Range Status   03/13/2018 240.4 mg/dL Final

## 2022-09-07 ENCOUNTER — MEDICAL CORRESPONDENCE (OUTPATIENT)
Dept: HEALTH INFORMATION MANAGEMENT | Facility: CLINIC | Age: 67
End: 2022-09-07

## 2022-09-07 ENCOUNTER — TRANSFERRED RECORDS (OUTPATIENT)
Dept: HEALTH INFORMATION MANAGEMENT | Facility: CLINIC | Age: 67
End: 2022-09-07

## 2022-09-16 ENCOUNTER — IMMUNIZATION (OUTPATIENT)
Dept: NURSING | Facility: CLINIC | Age: 67
End: 2022-09-16
Payer: COMMERCIAL

## 2022-09-16 PROCEDURE — 91313 COVID-19,PF,MODERNA BIVALENT: CPT

## 2022-09-16 PROCEDURE — 0134A COVID-19,PF,MODERNA BIVALENT: CPT

## 2022-09-17 ENCOUNTER — HEALTH MAINTENANCE LETTER (OUTPATIENT)
Age: 67
End: 2022-09-17

## 2022-09-25 DIAGNOSIS — R76.8 CYCLIC CITRULLINATED PEPTIDE (CCP) ANTIBODY POSITIVE: ICD-10-CM

## 2022-09-25 DIAGNOSIS — M05.79 SEROPOSITIVE RHEUMATOID ARTHRITIS OF MULTIPLE SITES (H): ICD-10-CM

## 2022-09-26 RX ORDER — HYDROXYCHLOROQUINE SULFATE 200 MG/1
TABLET, FILM COATED ORAL
Qty: 180 TABLET | Refills: 0 | Status: SHIPPED | OUTPATIENT
Start: 2022-09-26 | End: 2023-06-21

## 2022-10-06 ENCOUNTER — TELEPHONE (OUTPATIENT)
Dept: FAMILY MEDICINE | Facility: CLINIC | Age: 67
End: 2022-10-06

## 2022-10-06 DIAGNOSIS — M67.449 GANGLION CYST OF FINGER: ICD-10-CM

## 2022-10-06 DIAGNOSIS — G56.01 CARPAL TUNNEL SYNDROME OF RIGHT WRIST: Primary | ICD-10-CM

## 2022-10-06 NOTE — TELEPHONE ENCOUNTER
Order/Referral Request    Who is requesting: Patient     Orders being requested: Referral     Reason service is needed/diagnosis: cyst removal and carpal tunnel     When are orders needed by: At your earliest convenient.     Has this been discussed with Provider: Yes: Pt called stated that when she last saw provider in August, provider was planning to put in referrals for her if needed. Pt stated referral was for her to see a specialist to remove a cyst on her right hand middle finger and a specialist for carpal tunnel? Please look into this request and order if applicable or follow back up with pt to advise. Thank you.     Does patient have a preference on a Group/Provider/Facility? na    Does patient have an appointment scheduled?: No    Where to send orders: N/A    Could we send this information to you in [facility] or would you prefer to receive a phone call?:   No preference   Okay to leave a detailed message?: Yes at Home number on file [phone] (home)

## 2022-10-20 ENCOUNTER — ANCILLARY PROCEDURE (OUTPATIENT)
Dept: MAMMOGRAPHY | Facility: CLINIC | Age: 67
End: 2022-10-20
Attending: INTERNAL MEDICINE
Payer: COMMERCIAL

## 2022-10-20 ENCOUNTER — IMMUNIZATION (OUTPATIENT)
Dept: FAMILY MEDICINE | Facility: CLINIC | Age: 67
End: 2022-10-20
Payer: COMMERCIAL

## 2022-10-20 DIAGNOSIS — Z12.31 VISIT FOR SCREENING MAMMOGRAM: ICD-10-CM

## 2022-10-20 PROCEDURE — 90662 IIV NO PRSV INCREASED AG IM: CPT

## 2022-10-20 PROCEDURE — 77067 SCR MAMMO BI INCL CAD: CPT

## 2022-10-20 PROCEDURE — 90471 IMMUNIZATION ADMIN: CPT

## 2022-10-21 NOTE — TELEPHONE ENCOUNTER
DIAGNOSIS: Carpal tunnel syndrome of right wrist [G56.01]  Ganglion cyst of finger , Deyanira Meraz MD in [facility]   APPOINTMENT DATE: 10.28.22   NOTES STATUS DETAILS   OFFICE NOTE from referring provider Internal 8.23.22 Dr Deyanira Meraz, [facility]   MEDICATION LIST Internal    LABS     CBC/DIFF Internal       Ambulatory

## 2022-10-28 ENCOUNTER — OFFICE VISIT (OUTPATIENT)
Dept: ORTHOPEDICS | Facility: CLINIC | Age: 67
End: 2022-10-28
Attending: FAMILY MEDICINE
Payer: COMMERCIAL

## 2022-10-28 ENCOUNTER — PRE VISIT (OUTPATIENT)
Dept: ORTHOPEDICS | Facility: CLINIC | Age: 67
End: 2022-10-28

## 2022-10-28 ENCOUNTER — ANCILLARY PROCEDURE (OUTPATIENT)
Dept: GENERAL RADIOLOGY | Facility: CLINIC | Age: 67
End: 2022-10-28
Attending: ORTHOPAEDIC SURGERY
Payer: COMMERCIAL

## 2022-10-28 VITALS — WEIGHT: 194 LBS | BODY MASS INDEX: 28.73 KG/M2 | HEIGHT: 69 IN

## 2022-10-28 DIAGNOSIS — M67.449 GANGLION CYST OF FINGER: ICD-10-CM

## 2022-10-28 DIAGNOSIS — M25.531 RIGHT WRIST PAIN: Primary | ICD-10-CM

## 2022-10-28 DIAGNOSIS — M25.531 RIGHT WRIST PAIN: ICD-10-CM

## 2022-10-28 DIAGNOSIS — G56.01 RIGHT CARPAL TUNNEL SYNDROME: ICD-10-CM

## 2022-10-28 DIAGNOSIS — G56.01 CARPAL TUNNEL SYNDROME OF RIGHT WRIST: ICD-10-CM

## 2022-10-28 PROCEDURE — 73110 X-RAY EXAM OF WRIST: CPT | Mod: RT | Performed by: RADIOLOGY

## 2022-10-28 PROCEDURE — 99203 OFFICE O/P NEW LOW 30 MIN: CPT | Performed by: ORTHOPAEDIC SURGERY

## 2022-10-28 NOTE — NURSING NOTE
"Reason For Visit:   Chief Complaint   Patient presents with     Consult     Consult:  Referred by Deyanira Pantoja MD (PCP).  C/O right wrist pain since spring of 2022.  C/C numbness/tingling of 1st-4th digits. Denies dorsal right wrist joint pain or palmar side pain.  Slightly limited PROM in right wrist flexion/extension compared to L side.  Denies acute injury or fall.  Is a .  Symptoms primarily at night and not interfering with work.       Ht 1.74 m (5' 8.5\")   Wt 88 kg (194 lb)   BMI 29.07 kg/m      Pain Assessment  Patient Currently in Pain: Denies    Eamon Arceo ATC    "

## 2022-10-28 NOTE — LETTER
10/28/2022         RE: Elza Polk  [address]  [place] [address]        Dear Colleague,    Thank you for referring your patient, Elza Polk, to the [facility]. Please see a copy of my visit note below.    S:  RUE symptoms in this .  Numbness and tingling fingers.  Bothers at night, not at work.   Seropositive RA on plaquenil.         Patient Active Problem List   Diagnosis     Osteoarthritis Of The Knee     Ulnar neuropathy     High risk medication use     Health care maintenance     Seropositive rheumatoid arthritis of multiple sites (H)     Cyclic citrullinated peptide (CCP) antibody positive     Menopause     S/P total hysterectomy     Trochanteric bursitis, left hip     Lumbar paraspinal muscle spasm     Malignant neoplasm of central portion of left breast in female, estrogen receptor positive (H)     Allergy to penicillin            Past Medical History:   Diagnosis Date     H/O small bowel obstruction      Rheumatoid arthritis (H)             Past Surgical History:   Procedure Laterality Date     HYSTERECTOMY  2008     OOPHORECTOMY  2008     [place] MASTECTOMY, PARTIAL Left 9/23/2020    Procedure: Left Lumpectomy after Wire Loclaization; [place] Lymph Node Biopsy;  Surgeon: Анна Coffey MD;  Location: [facility];  Service: General     US BIOPSY FINE NEEDLE ASPIRATION LYMPH NODE (BREAST) LEFT Left 9/3/2020      BREAST CORE BIOPSY LEFT Left 9/3/2020     US BREAST LOC W SENT NODE INJ LEFT Left 9/23/2020     [facility] FREEING BOWEL ADHESION,ENTEROLYSIS      Description: Laparoscopic Lysis Of Intestinal Adhesions;  Recorded: 08/22/2008;     [facility] TOTAL ABDOM HYSTERECTOMY      Description: Total Abdominal Hysterectomy;  Proc Date: 01/01/2005;  Comments: for uterine fibroids and adhesions; ovaries are gone, too            Social History     Tobacco Use     Smoking status: Former     Smokeless tobacco: Never   Substance Use Topics      Alcohol use: Yes     Comment: Alcoholic Drinks/day: occasional             Family History   Problem Relation Age of Onset     Prostate Cancer Father      Coronary Artery Disease Father      Hypertension Father      Hypertension Mother      Diabetes Mother      Cerebrovascular Disease Mother      Breast Cancer Cousin      Other Cancer Cousin      Anxiety Disorder Son      Anxiety Disorder Son      Mental Illness Sister              No Known Allergies         Current Outpatient Medications   Medication Sig Dispense Refill     anastrozole (ARIMIDEX) 1 mg tablet [ANASTROZOLE (ARIMIDEX) 1 MG TABLET] Take 1 mg by mouth daily.       CALCIUM CARBONATE/VITAMIN D3 (CALCIUM+D ORAL) [CALCIUM CARBONATE/VITAMIN D3 (CALCIUM+D ORAL)] Take 1 tablet by mouth daily.       hydroxychloroquine (PLAQUENIL) 200 MG tablet TAKE 2 TABLETS(400 MG) BY MOUTH DAILY 180 tablet 0     multivitamin (ONE A DAY) per tablet [MULTIVITAMIN (ONE A DAY) PER TABLET] Take 1 tablet by mouth daily.            Review Of Systems  Skin: negative  Eyes: negative  Ears/Nose/Throat: negative  Respiratory: No shortness of breath, dyspnea on exertion, cough, or hemoptysis    O: Physical Exam:   Negative tinnels/ phalens.  No thenar wasting.  Dysesthesia described in median distribution.  Negative gary.  Median/radial/ulnar motors intact without significant sensory abnormality identified.    Lab:    Images:  Findings:     PA, oblique and lateral view(s) of the right wrist were obtained.      No acute osseous abnormality.  No erosion.     No substantial degenerative change.     Blunted ulnar styloid. Positive ulnar variance.     Soft tissue is unremarkable.                                                                      Impression:  1. No acute osseous abnormality.  2. No substantial degenerative change.         A:  R CTS    P:  EMG/NCV and see back after study.  Discussed carpal canal injection and open carpal tunnel release as options besides bracing.  Will  notify if exacerbation symptoms.           In addition to the above assessment and plan each active problem on Elza's problem list was evaluated today. This included the questioning of Elza for any medication problems. We will continue the current treatment plan for these active problems except as noted.    Answers for HPI/ROS submitted by the patient on 10/27/2022  General Symptoms: No  Skin Symptoms: No  HENT Symptoms: No  EYE SYMPTOMS: No  HEART SYMPTOMS: No  LUNG SYMPTOMS: No  INTESTINAL SYMPTOMS: No  URINARY SYMPTOMS: No  GYNECOLOGIC SYMPTOMS: No  BREAST SYMPTOMS: No  SKELETAL SYMPTOMS: No  BLOOD SYMPTOMS: No  NERVOUS SYSTEM SYMPTOMS: No  MENTAL HEALTH SYMPTOMS: No      LUI KNOX MD

## 2022-11-01 NOTE — PROGRESS NOTES
S:  RUE symptoms in this .  Numbness and tingling fingers.  Bothers at night, not at work.   Seropositive RA on plaquenil.         Patient Active Problem List   Diagnosis     Osteoarthritis Of The Knee     Ulnar neuropathy     High risk medication use     Health care maintenance     Seropositive rheumatoid arthritis of multiple sites (H)     Cyclic citrullinated peptide (CCP) antibody positive     Menopause     S/P total hysterectomy     Trochanteric bursitis, left hip     Lumbar paraspinal muscle spasm     Malignant neoplasm of central portion of left breast in female, estrogen receptor positive (H)     Allergy to penicillin            Past Medical History:   Diagnosis Date     H/O small bowel obstruction      Rheumatoid arthritis (H)             Past Surgical History:   Procedure Laterality Date     HYSTERECTOMY  2008     OOPHORECTOMY  2008     [place] MASTECTOMY, PARTIAL Left 9/23/2020    Procedure: Left Lumpectomy after Wire Loclaization; [place] Lymph Node Biopsy;  Surgeon: Анна Coffey MD;  Location: [facility];  Service: General     US BIOPSY FINE NEEDLE ASPIRATION LYMPH NODE (BREAST) LEFT Left 9/3/2020      BREAST CORE BIOPSY LEFT Left 9/3/2020     US BREAST LOC W SENT NODE INJ LEFT Left 9/23/2020     [facility] FREEING BOWEL ADHESION,ENTEROLYSIS      Description: Laparoscopic Lysis Of Intestinal Adhesions;  Recorded: 08/22/2008;     C TOTAL ABDOM HYSTERECTOMY      Description: Total Abdominal Hysterectomy;  Proc Date: 01/01/2005;  Comments: for uterine fibroids and adhesions; ovaries are gone, too            Social History     Tobacco Use     Smoking status: Former     Smokeless tobacco: Never   Substance Use Topics     Alcohol use: Yes     Comment: Alcoholic Drinks/day: occasional             Family History   Problem Relation Age of Onset     Prostate Cancer Father      Coronary Artery Disease Father      Hypertension Father      Hypertension Mother      Diabetes Mother       Cerebrovascular Disease Mother      Breast Cancer Cousin      Other Cancer Cousin      Anxiety Disorder Son      Anxiety Disorder Son      Mental Illness Sister              No Known Allergies         Current Outpatient Medications   Medication Sig Dispense Refill     anastrozole (ARIMIDEX) 1 mg tablet [ANASTROZOLE (ARIMIDEX) 1 MG TABLET] Take 1 mg by mouth daily.       CALCIUM CARBONATE/VITAMIN D3 (CALCIUM+D ORAL) [CALCIUM CARBONATE/VITAMIN D3 (CALCIUM+D ORAL)] Take 1 tablet by mouth daily.       hydroxychloroquine (PLAQUENIL) 200 MG tablet TAKE 2 TABLETS(400 MG) BY MOUTH DAILY 180 tablet 0     multivitamin (ONE A DAY) per tablet [MULTIVITAMIN (ONE A DAY) PER TABLET] Take 1 tablet by mouth daily.            Review Of Systems  Skin: negative  Eyes: negative  Ears/Nose/Throat: negative  Respiratory: No shortness of breath, dyspnea on exertion, cough, or hemoptysis    O: Physical Exam:   Negative tinnels/ phalens.  No thenar wasting.  Dysesthesia described in median distribution.  Negative gary.  Median/radial/ulnar motors intact without significant sensory abnormality identified.    Lab:    Images:  Findings:     PA, oblique and lateral view(s) of the right wrist were obtained.      No acute osseous abnormality.  No erosion.     No substantial degenerative change.     Blunted ulnar styloid. Positive ulnar variance.     Soft tissue is unremarkable.                                                                      Impression:  1. No acute osseous abnormality.  2. No substantial degenerative change.         A:  R CTS    P:  EMG/NCV and see back after study.  Discussed carpal canal injection and open carpal tunnel release as options besides bracing.  Will notify if exacerbation symptoms.           In addition to the above assessment and plan each active problem on Elza's problem list was evaluated today. This included the questioning of Elza for any medication problems. We will continue the current treatment  plan for these active problems except as noted.    Answers for HPI/ROS submitted by the patient on 10/27/2022  General Symptoms: No  Skin Symptoms: No  HENT Symptoms: No  EYE SYMPTOMS: No  HEART SYMPTOMS: No  LUNG SYMPTOMS: No  INTESTINAL SYMPTOMS: No  URINARY SYMPTOMS: No  GYNECOLOGIC SYMPTOMS: No  BREAST SYMPTOMS: No  SKELETAL SYMPTOMS: No  BLOOD SYMPTOMS: No  NERVOUS SYSTEM SYMPTOMS: No  MENTAL HEALTH SYMPTOMS: No

## 2022-11-08 ENCOUNTER — TRANSFERRED RECORDS (OUTPATIENT)
Dept: HEALTH INFORMATION MANAGEMENT | Facility: CLINIC | Age: 67
End: 2022-11-08

## 2022-11-15 ENCOUNTER — OFFICE VISIT (OUTPATIENT)
Dept: NEUROLOGY | Facility: CLINIC | Age: 67
End: 2022-11-15
Attending: ORTHOPAEDIC SURGERY
Payer: COMMERCIAL

## 2022-11-15 DIAGNOSIS — G56.01 CARPAL TUNNEL SYNDROME OF RIGHT WRIST: ICD-10-CM

## 2022-11-15 PROCEDURE — 95908 NRV CNDJ TST 3-4 STUDIES: CPT | Performed by: PSYCHIATRY & NEUROLOGY

## 2022-11-15 NOTE — PROGRESS NOTES
[facility]  Electrodiagnostic Laboratory                 Department of Neurology                                                                                                         Test Date:  11/15/2022    Patient: Elza Polk : 1955 Physician: Richy Herrmann MD   Sex: Female AGE: 67 year Ref Phys: Eamon Diaz MD   ID#: [id number]   Technician: Silvano Barrios     Clinical Information:    67 year old woman with chief complaint of right hand pain and paresthesias waking her up at night and causing her to shake the hand (flick sign). Query right carpal tunnel syndrome.     Techniques:    Motor and sensory conduction studies were done with surface recording electrodes.     Results:    Right median (APB) motor NCS showed prolonged distal latency, normal CMAP amplitudes and normal conduction velocity at the forearm. Right median to ulnar second lumbrical/palmar interossei comparison motor NCSs showed increased latency difference (3.3 ms, normal is <0.5 ms). Right ulnar (ADM) motor NCS was normal. Right median SNAP was absent from digit II. Right ulnar antidromic sensory NCS was normal.    Based upon the nerve conduction studies the patient has a well defined median neuropathy at the wrist as specified within this report. In accordance with [employer] and CMS policies no needle EMG was performed.     Interpretation:    Abnormal study. There is electrodiagnostic evidence of a right median neuropathy at the wrist, as seen in carpal tunnel syndrome, of moderate severity.     ___________________________  Richy Herrmann MD        Nerve Conduction Studies  Motor Sites      Latency Amplitude Neg. Amp Diff Segment Distance Velocity Neg. Dur Neg Area Diff Temperature Comment   Site (ms) Norm (mV) Norm %  cm m/s Norm ms %  C    Right Median (APB) Motor   Wrist 5.6  < 4.4 7.8  > 5.0  Wrist-APB 8   6.2  32.9    Elbow 9.6 - 7.5 - -3.8 Elbow-Wrist 22 55  > 48 6.3 -1.97  33    Right Median/Ulnar (Lumb-Dors Int II) Motor        Median (Lumb I)   Wrist 5.6 - 0.89 -      5.5  32.8         Ulnar (Dorsal Int (manus))   Wrist 2.3 - 2.7 -      4.7  32.9    Right Ulnar (ADM) Motor   Wrist 2.7  < 3.5 9.4  > 5.0  Wrist-ADM 8   4.7  32.6    Bel Elbow 5.9 - 8.7 - -7.4 Bel Elbow-Wrist 19.8 62  > 48 4.9 -4.1 32.6    Abv Elbow 7.7 - 7.6 - -12.6 Abv Elbow-Bel Elbow 10.7 59  > 48 5.1 -8.6 32.6      Sensory Sites      Onset Lat Peak Lat Amp (O-P) Amp (P-P) Segment Distance Velocity Temperature Comment   Site ms ms  V Norm  V  cm m/s Norm  C    Right Median Sensory   Wrist-Dig II NR NR NR  > 10 NR Wrist-Dig II 14 NR  > 48 32.4    Right Ulnar Sensory   Wrist-Dig V 2.6 3.2 15  > 8 5 Wrist-Dig V 12.5 48  > 48 32.7            NCS Waveforms:    Motor           Sensory           Ultrasound Images:

## 2022-11-15 NOTE — LETTER
11/15/2022       RE: Elza Polk  [address]  [place] [address]     Dear Colleague,    Thank you for referring your patient, Elza Polk, to the [facility] EMG [facility]IN[facility] [place] at [facility]. Please see a copy of my visit note below.                        [facility]  Electrodiagnostic Laboratory                 Department of Neurology                                                                                                         Test Date:  11/15/2022    Patient: Elza Polk : 1955 Physician: Richy Herrmann MD   Sex: Female AGE: 67 year Ref Phys: Eamon Diaz MD   ID#: [id number]   Technician: Silvano Barrios     Clinical Information:    67 year old woman with chief complaint of right hand pain and paresthesias waking her up at night and causing her to shake the hand (flick sign). Query right carpal tunnel syndrome.     Techniques:    Motor and sensory conduction studies were done with surface recording electrodes.     Results:    Right median (APB) motor NCS showed prolonged distal latency, normal CMAP amplitudes and normal conduction velocity at the forearm. Right median to ulnar second lumbrical/palmar interossei comparison motor NCSs showed increased latency difference (3.3 ms, normal is <0.5 ms). Right ulnar (ADM) motor NCS was normal. Right median SNAP was absent from digit II. Right ulnar antidromic sensory NCS was normal.    Based upon the nerve conduction studies the patient has a well defined median neuropathy at the wrist as specified within this report. In accordance with AAN[employer] and CMS policies no needle EMG was performed.     Interpretation:    Abnormal study. There is electrodiagnostic evidence of a right median neuropathy at the wrist, as seen in carpal tunnel syndrome, of moderate severity.     ___________________________  Richy Herrmann MD        Nerve Conduction  Studies  Motor Sites      Latency Amplitude Neg. Amp Diff Segment Distance Velocity Neg. Dur Neg Area Diff Temperature Comment   Site (ms) Norm (mV) Norm %  cm m/s Norm ms %  C    Right Median (APB) Motor   Wrist 5.6  < 4.4 7.8  > 5.0  Wrist-APB 8   6.2  32.9    Elbow 9.6 - 7.5 - -3.8 Elbow-Wrist 22 55  > 48 6.3 -1.97 33    Right Median/Ulnar (Lumb-Dors Int II) Motor        Median (Lumb I)   Wrist 5.6 - 0.89 -      5.5  32.8         Ulnar (Dorsal Int (manus))   Wrist 2.3 - 2.7 -      4.7  32.9    Right Ulnar (ADM) Motor   Wrist 2.7  < 3.5 9.4  > 5.0  Wrist-ADM 8   4.7  32.6    Bel Elbow 5.9 - 8.7 - -7.4 Bel Elbow-Wrist 19.8 62  > 48 4.9 -4.1 32.6    Abv Elbow 7.7 - 7.6 - -12.6 Abv Elbow-Bel Elbow 10.7 59  > 48 5.1 -8.6 32.6      Sensory Sites      Onset Lat Peak Lat Amp (O-P) Amp (P-P) Segment Distance Velocity Temperature Comment   Site ms ms  V Norm  V  cm m/s Norm  C    Right Median Sensory   Wrist-Dig II NR NR NR  > 10 NR Wrist-Dig II 14 NR  > 48 32.4    Right Ulnar Sensory   Wrist-Dig V 2.6 3.2 15  > 8 5 Wrist-Dig V 12.5 48  > 48 32.7        NCS Waveforms:    Motor           Sensory         Ultrasound Images:    Again, thank you for allowing me to participate in the care of your patient.      Sincerely,    Richy Herrmann MD

## 2022-12-26 ENCOUNTER — TELEPHONE (OUTPATIENT)
Dept: FAMILY MEDICINE | Facility: CLINIC | Age: 67
End: 2022-12-26

## 2022-12-26 NOTE — TELEPHONE ENCOUNTER
FYI - Status Update    Who is Calling: patient    Update: pt had EMG done and she has not received further instructions on what to do next. Requesting callback with further advice on how to treat her carpel tunnel     Does caller want a call/response back: Yes     Could we send this information to you in [email or web address] or would you prefer to receive a phone call?:   Patient would prefer a phone call   Okay to leave a detailed message?: Yes at Cell number on file:    Telephone Information:   Mobile [phone]

## 2022-12-26 NOTE — TELEPHONE ENCOUNTER
ATC called and left  for patient stating that Dr. Diaz wanted to see patient back in clinic after EMG/NVC studies were completed.  ATC explained to patient in  that she can call back our main line and request an appointment with Dr. Diaz at her earliest convenience.    Eamon Arceo ATC

## 2023-01-17 ENCOUNTER — TELEPHONE (OUTPATIENT)
Dept: ORTHOPEDICS | Facility: CLINIC | Age: 68
End: 2023-01-17
Payer: COMMERCIAL

## 2023-01-17 NOTE — TELEPHONE ENCOUNTER
[facility] Call Center    Phone Message    May a detailed message be left on voicemail: yes     Reason for Call: Other: Patient is requesting a call back regarding scheduling an appt with Dr. Diaz?     Action Taken: Message routed to:  [facility] ortho    Travel Screening: Not Applicable

## 2023-01-17 NOTE — TELEPHONE ENCOUNTER
Called pt multiple times. Left a VM for call back. Please schedule pt with Dr. Diaz when pt calls.         -MARY ANN Jewell- Orthopedics

## 2023-03-17 ENCOUNTER — OFFICE VISIT (OUTPATIENT)
Dept: ORTHOPEDICS | Facility: CLINIC | Age: 68
End: 2023-03-17
Payer: COMMERCIAL

## 2023-03-17 DIAGNOSIS — G56.01 CARPAL TUNNEL SYNDROME OF RIGHT WRIST: Primary | ICD-10-CM

## 2023-03-17 PROCEDURE — 20526 THER INJECTION CARP TUNNEL: CPT | Mod: RT | Performed by: ORTHOPAEDIC SURGERY

## 2023-03-17 PROCEDURE — 99213 OFFICE O/P EST LOW 20 MIN: CPT | Mod: 25 | Performed by: ORTHOPAEDIC SURGERY

## 2023-03-17 RX ADMIN — LIDOCAINE HYDROCHLORIDE 2 ML: 10 INJECTION, SOLUTION EPIDURAL; INFILTRATION; INTRACAUDAL; PERINEURAL at 14:33

## 2023-03-17 RX ADMIN — BUPIVACAINE HYDROCHLORIDE 2 ML: 2.5 INJECTION, SOLUTION EPIDURAL; INFILTRATION; INTRACAUDAL at 14:33

## 2023-03-17 RX ADMIN — TRIAMCINOLONE ACETONIDE 40 MG: 40 INJECTION, SUSPENSION INTRA-ARTICULAR; INTRAMUSCULAR at 14:33

## 2023-03-17 NOTE — PROGRESS NOTES
Medium Joint Injection/Arthrocentesis    Date/Time: 3/17/2023 2:33 PM  Performed by: Eamon Diaz MD  Authorized by: Eamon Diaz MD     Indications:  Pain  Needle Size:  25 G  Guidance: surface landmarks    Location:  Wrist  Location comment:  Right carpal canal/median nerve  Medications:  40 mg triamcinolone 40 MG/ML; 2 mL bupivacaine HCl (PF) 0.25 %; 2 mL lidocaine (PF) 1 %  Outcome:  Tolerated well, no immediate complications  Procedure discussed: discussed risks, benefits, and alternatives    Consent Given by:  Patient  Timeout: timeout called immediately prior to procedure    Prep: patient was prepped and draped in usual sterile fashion          [facility] ORTHOPEDIC [address]  [address] [facility] [id number] [phone]  Dept: [phone]  ______________________________________________________________________________    Patient: Elza Polk   : 1955   MRN: [id number]   2023    INVASIVE PROCEDURE SAFETY CHECKLIST    Date: 3/17/2023   Procedure: Right carpal canal/median nerve steroid injection  Patient Name: Elza Polk  MRN: [id number]  YOB: 1955    Action: Complete sections as appropriate. Any discrepancy results in a HARD COPY until resolved.     PRE PROCEDURE:  Patient ID verified with 2 identifiers (name and  or MRN): Yes  Procedure and site verified with patient/designee (when able): Yes  Accurate consent documentation in medical record: Yes  H&P (or appropriate assessment) documented in medical record: Yes  H&P must be up to 20 days prior to procedure and updates within 24 hours of procedure as applicable: Yes  Relevant diagnostic and radiology test results appropriately labeled and displayed as applicable: Yes  Procedure site(s) marked with provider initials: NA    TIMEOUT:  Time-Out performed immediately prior to starting procedure, including verbal and active participation of all team members  addressing the following:Yes  * Correct patient identify  * Confirmed that the correct side and site are marked  * An accurate procedure consent form  * Agreement on the procedure to be done  * Correct patient position  * Relevant images and results are properly labeled and appropriately displayed  * The need to administer antibiotics or fluids for irrigation purposes during the procedure as applicable   * Safety precautions based on patient history or medication use    DURING PROCEDURE: Verification of correct person, site, and procedures any time the responsibility for care of the patient is transferred to another member of the care team.       The following medications were given:         Prior to injection, verified patient identity using patient's name and date of birth.  Due to injection administration, patient instructed to remain in clinic for 15 minutes  afterwards, and to report any adverse reaction to me immediately.    Carpal canal/median nerve steroid injection  Medication Name: Triamcinolone 40mg/mL NDC [address]-8  Drug Amount Wasted:  None.  Vial/Syringe: Single dose vial  Expiration Date:  9/1/24    Medication Name: Bupivacaine .25% 4261-6195-39  Drug Amount Wasted:  Yes 8mL  Vial/Syringe: Single dose vial  Expiration Date:  12/1/23    Medication Name: Lidocaine 1% 45658-472-80  Drug Amount Wasted:  Yes 3mL.  Vial/Syringe: Single dose vial  Expiration Date:  12/1/26    Scribed by Eamon Arceo ATC for Dr. Diaz on March 17, 2023 at 2:36pm based on the provider's statements to me.     Eamon Arceo ATC    I was present entire visit and performed injection.    Eamon Diaz MD

## 2023-03-17 NOTE — LETTER
3/17/2023         RE: Elza Polk  [address]  [place] [address]        Dear Colleague,    Thank you for referring your patient, Elza Polk, to the [facility] ORTHOPEDIC CLINIC [place]. Please see a copy of my visit note below.    S:  Continues with symptoms RUE and does not use a brace at night R wrist.         Patient Active Problem List   Diagnosis     Osteoarthritis Of The Knee     Ulnar neuropathy     High risk medication use     Health care maintenance     Seropositive rheumatoid arthritis of multiple sites (H)     Cyclic citrullinated peptide (CCP) antibody positive     Menopause     S/P total hysterectomy     Trochanteric bursitis, left hip     Lumbar paraspinal muscle spasm     Malignant neoplasm of central portion of left breast in female, estrogen receptor positive (H)     Allergy to penicillin            Past Medical History:   Diagnosis Date     H/O small bowel obstruction      Rheumatoid arthritis (H)             Past Surgical History:   Procedure Laterality Date     HYSTERECTOMY  2008     OOPHORECTOMY  2008     [place] MASTECTOMY, PARTIAL Left 9/23/2020    Procedure: Left Lumpectomy after Wire Loclaization; [place] Lymph Node Biopsy;  Surgeon: Анна Coffey MD;  Location: [facility];  Service: General     US BIOPSY FINE NEEDLE ASPIRATION LYMPH NODE (BREAST) LEFT Left 9/3/2020     US BREAST CORE BIOPSY LEFT Left 9/3/2020     US BREAST LOC W SENT NODE INJ LEFT Left 9/23/2020     Z FREEING BOWEL ADHESION,ENTEROLYSIS      Description: Laparoscopic Lysis Of Intestinal Adhesions;  Recorded: 08/22/2008;     [facility] TOTAL ABDOM HYSTERECTOMY      Description: Total Abdominal Hysterectomy;  Proc Date: 01/01/2005;  Comments: for uterine fibroids and adhesions; ovaries are gone, too            Social History     Tobacco Use     Smoking status: Former     Smokeless tobacco: Never   Substance Use Topics     Alcohol use: Yes     Comment: Alcoholic Drinks/day: occasional              Family History   Problem Relation Age of Onset     Prostate Cancer Father      Coronary Artery Disease Father      Hypertension Father      Hypertension Mother      Diabetes Mother      Cerebrovascular Disease Mother      Breast Cancer Cousin      Other Cancer Cousin      Anxiety Disorder Son      Anxiety Disorder Son      Mental Illness Sister              No Known Allergies         Current Outpatient Medications   Medication Sig Dispense Refill     anastrozole (ARIMIDEX) 1 mg tablet [ANASTROZOLE (ARIMIDEX) 1 MG TABLET] Take 1 mg by mouth daily.       CALCIUM CARBONATE/VITAMIN D3 (CALCIUM+D ORAL) [CALCIUM CARBONATE/VITAMIN D3 (CALCIUM+D ORAL)] Take 1 tablet by mouth daily.       hydroxychloroquine (PLAQUENIL) 200 MG tablet TAKE 2 TABLETS(400 MG) BY MOUTH DAILY 180 tablet 0     multivitamin (ONE A DAY) per tablet [MULTIVITAMIN (ONE A DAY) PER TABLET] Take 1 tablet by mouth daily.            Review Of Systems  Skin: negative  Eyes: negative  Ears/Nose/Throat: negative  Respiratory: No shortness of breath, dyspnea on exertion, cough, or hemoptysis    O: Physical Exam:  + Tinel's at wrist.  No thenar wasting.      Lab:  EMG/NCV consistent with moderate CTS    Images: 3 views right wrist radiographs 10/28/2022 2:59 PM     History: Right wrist pain; Right carpal tunnel syndrome     Comparison: None available.     Findings:     PA, oblique and lateral view(s) of the right wrist were obtained.      No acute osseous abnormality.  No erosion.     No substantial degenerative change.     Blunted ulnar styloid. Positive ulnar variance.     Soft tissue is unremarkable.                                                                      Impression:  1. No acute osseous abnormality.           A:  R CTS     P:  Inject R carpal canal after verbal and written consent, ethyl chloride and chloroprep.  Follow outcomes  See back 3 mos and notify if exacerbation symptoms  Did discuss CTR but patient would rather avoid surgery  if possible           In addition to the above assessment and plan each active problem on Elza's problem list was evaluated today. This included the questioning of Elza for any medication problems. We will continue the current treatment plan for these active problems except as noted.      Medium Joint Injection/Arthrocentesis    Date/Time: 3/17/2023 2:33 PM  Performed by: Eamon Diaz MD  Authorized by: Eamon Diaz MD     Indications:  Pain  Needle Size:  25 G  Guidance: surface landmarks    Location:  Wrist  Location comment:  Right carpal canal/median nerve  Medications:  40 mg triamcinolone 40 MG/ML; 2 mL bupivacaine HCl (PF) 0.25 %; 2 mL lidocaine (PF) 1 %  Outcome:  Tolerated well, no immediate complications  Procedure discussed: discussed risks, benefits, and alternatives    Consent Given by:  Patient  Timeout: timeout called immediately prior to procedure    Prep: patient was prepped and draped in usual sterile fashion          [facility] ORTHOPEDIC [address] [id number] [phone]  Dept: [phone]  ______________________________________________________________________________    Patient: Elza Polk   : 1955   MRN: [id number]   2023    INVASIVE PROCEDURE SAFETY CHECKLIST    Date: 3/17/2023   Procedure: Right carpal canal/median nerve steroid injection  Patient Name: Elza Polk  MRN: [id number]  YOB: 1955    Action: Complete sections as appropriate. Any discrepancy results in a HARD COPY until resolved.     PRE PROCEDURE:  Patient ID verified with 2 identifiers (name and  or MRN): Yes  Procedure and site verified with patient/designee (when able): Yes  Accurate consent documentation in medical record: Yes  H&P (or appropriate assessment) documented in medical record: Yes  H&P must be up to 20 days prior to procedure and updates within 24 hours of procedure as applicable:  Yes  Relevant diagnostic and radiology test results appropriately labeled and displayed as applicable: Yes  Procedure site(s) marked with provider initials: NA    TIMEOUT:  Time-Out performed immediately prior to starting procedure, including verbal and active participation of all team members addressing the following:Yes  * Correct patient identify  * Confirmed that the correct side and site are marked  * An accurate procedure consent form  * Agreement on the procedure to be done  * Correct patient position  * Relevant images and results are properly labeled and appropriately displayed  * The need to administer antibiotics or fluids for irrigation purposes during the procedure as applicable   * Safety precautions based on patient history or medication use    DURING PROCEDURE: Verification of correct person, site, and procedures any time the responsibility for care of the patient is transferred to another member of the care team.       The following medications were given:         Prior to injection, verified patient identity using patient's name and date of birth.  Due to injection administration, patient instructed to remain in clinic for 15 minutes  afterwards, and to report any adverse reaction to me immediately.    Carpal canal/median nerve steroid injection  Medication Name: Triamcinolone 40mg/mL NDC [address]-9  Drug Amount Wasted:  None.  Vial/Syringe: Single dose vial  Expiration Date:  9/1/24    Medication Name: Bupivacaine .25% 3153-6013-06  Drug Amount Wasted:  Yes 8mL  Vial/Syringe: Single dose vial  Expiration Date:  12/1/23    Medication Name: Lidocaine 1% 61058-676-94  Drug Amount Wasted:  Yes 3mL.  Vial/Syringe: Single dose vial  Expiration Date:  12/1/26    Scribed by Eamon Arceo ATC for Dr. Diaz on March 17, 2023 at 2:36pm based on the provider's statements to me.     Eamon Arceo ATC    I was present entire visit and performed injection.    Eamon Diaz MD

## 2023-03-17 NOTE — PROGRESS NOTES
S:  Continues with symptoms RUE and does not use a brace at night R wrist.         Patient Active Problem List   Diagnosis     Osteoarthritis Of The Knee     Ulnar neuropathy     High risk medication use     Health care maintenance     Seropositive rheumatoid arthritis of multiple sites (H)     Cyclic citrullinated peptide (CCP) antibody positive     Menopause     S/P total hysterectomy     Trochanteric bursitis, left hip     Lumbar paraspinal muscle spasm     Malignant neoplasm of central portion of left breast in female, estrogen receptor positive (H)     Allergy to penicillin            Past Medical History:   Diagnosis Date     H/O small bowel obstruction      Rheumatoid arthritis (H)             Past Surgical History:   Procedure Laterality Date     HYSTERECTOMY  2008     OOPHORECTOMY  2008     [place] MASTECTOMY, PARTIAL Left 9/23/2020    Procedure: Left Lumpectomy after Wire Loclaization; [place] Lymph Node Biopsy;  Surgeon: Анна Coffey MD;  Location[facility];  Service: General     US BIOPSY FINE NEEDLE ASPIRATION LYMPH NODE (BREAST) LEFT Left 9/3/2020      BREAST CORE BIOPSY LEFT Left 9/3/2020      BREAST LOC W SENT NODE INJ LEFT Left 9/23/2020     [facility] FREEING BOWEL ADHESION,ENTEROLYSIS      Description: Laparoscopic Lysis Of Intestinal Adhesions;  Recorded: 08/22/2008;     [facility] TOTAL ABDOM HYSTERECTOMY      Description: Total Abdominal Hysterectomy;  Proc Date: 01/01/2005;  Comments: for uterine fibroids and adhesions; ovaries are gone, too            Social History     Tobacco Use     Smoking status: Former     Smokeless tobacco: Never   Substance Use Topics     Alcohol use: Yes     Comment: Alcoholic Drinks/day: occasional             Family History   Problem Relation Age of Onset     Prostate Cancer Father      Coronary Artery Disease Father      Hypertension Father      Hypertension Mother      Diabetes Mother      Cerebrovascular Disease Mother      Breast Cancer Cousin      Other Cancer  Cousin      Anxiety Disorder Son      Anxiety Disorder Son      Mental Illness Sister              No Known Allergies         Current Outpatient Medications   Medication Sig Dispense Refill     anastrozole (ARIMIDEX) 1 mg tablet [ANASTROZOLE (ARIMIDEX) 1 MG TABLET] Take 1 mg by mouth daily.       CALCIUM CARBONATE/VITAMIN D3 (CALCIUM+D ORAL) [CALCIUM CARBONATE/VITAMIN D3 (CALCIUM+D ORAL)] Take 1 tablet by mouth daily.       hydroxychloroquine (PLAQUENIL) 200 MG tablet TAKE 2 TABLETS(400 MG) BY MOUTH DAILY 180 tablet 0     multivitamin (ONE A DAY) per tablet [MULTIVITAMIN (ONE A DAY) PER TABLET] Take 1 tablet by mouth daily.            Review Of Systems  Skin: negative  Eyes: negative  Ears/Nose/Throat: negative  Respiratory: No shortness of breath, dyspnea on exertion, cough, or hemoptysis    O: Physical Exam:  + Tinel's at wrist.  No thenar wasting.      Lab:  EMG/NCV consistent with moderate CTS    Images: 3 views right wrist radiographs 10/28/2022 2:59 PM     History: Right wrist pain; Right carpal tunnel syndrome     Comparison: None available.     Findings:     PA, oblique and lateral view(s) of the right wrist were obtained.      No acute osseous abnormality.  No erosion.     No substantial degenerative change.     Blunted ulnar styloid. Positive ulnar variance.     Soft tissue is unremarkable.                                                                      Impression:  1. No acute osseous abnormality.           A:  R CTS     P:  Inject R carpal canal after verbal and written consent, ethyl chloride and chloroprep.  Follow outcomes  See back 3 mos and notify if exacerbation symptoms  Did discuss CTR but patient would rather avoid surgery if possible           In addition to the above assessment and plan each active problem on Elza's problem list was evaluated today. This included the questioning of Elza for any medication problems. We will continue the current treatment plan for these active problems  except as noted.

## 2023-03-17 NOTE — NURSING NOTE
Reason For Visit:   Chief Complaint   Patient presents with     RECHECK     Patient returns to follow-up on right wrist.  Last seen 10/28/22.  Neurology appt 11/15/22.  Patient reports Sx have somewhat improved since last visit.  Sx only occur at night while sleeping.       There were no vitals taken for this visit.    Pain Assessment  Patient Currently in Pain: No    Eamon Arceo, ATC

## 2023-03-19 RX ORDER — LIDOCAINE HYDROCHLORIDE 10 MG/ML
2 INJECTION, SOLUTION EPIDURAL; INFILTRATION; INTRACAUDAL; PERINEURAL
Status: DISCONTINUED | OUTPATIENT
Start: 2023-03-17 | End: 2023-12-11

## 2023-03-19 RX ORDER — TRIAMCINOLONE ACETONIDE 40 MG/ML
40 INJECTION, SUSPENSION INTRA-ARTICULAR; INTRAMUSCULAR
Status: DISCONTINUED | OUTPATIENT
Start: 2023-03-17 | End: 2023-12-11

## 2023-03-19 RX ORDER — BUPIVACAINE HYDROCHLORIDE 2.5 MG/ML
2 INJECTION, SOLUTION EPIDURAL; INFILTRATION; INTRACAUDAL
Status: DISCONTINUED | OUTPATIENT
Start: 2023-03-17 | End: 2023-12-11

## 2023-03-30 DIAGNOSIS — R76.8 CYCLIC CITRULLINATED PEPTIDE (CCP) ANTIBODY POSITIVE: ICD-10-CM

## 2023-03-30 DIAGNOSIS — M05.79 SEROPOSITIVE RHEUMATOID ARTHRITIS OF MULTIPLE SITES (H): ICD-10-CM

## 2023-03-31 NOTE — TELEPHONE ENCOUNTER
Spoke to pt, lab appt scheduled this upcoming Monday, April 3rd.     Pt also reports having eye exam sometime in July/August 2022 through the [facility] in [place]. Fax # provided to pt, she will call them and ask them to send a copy of their report.     Routed to RHEUMATOLOGY SUPPORT POOL

## 2023-04-03 ENCOUNTER — LAB (OUTPATIENT)
Dept: LAB | Facility: CLINIC | Age: 68
End: 2023-04-03
Payer: COMMERCIAL

## 2023-04-03 DIAGNOSIS — R76.8 CYCLIC CITRULLINATED PEPTIDE (CCP) ANTIBODY POSITIVE: ICD-10-CM

## 2023-04-03 DIAGNOSIS — M05.79 SEROPOSITIVE RHEUMATOID ARTHRITIS OF MULTIPLE SITES (H): ICD-10-CM

## 2023-04-03 LAB
ALBUMIN SERPL BCG-MCNC: 4.3 G/DL (ref 3.5–5.2)
ALT SERPL W P-5'-P-CCNC: 22 U/L (ref 10–35)
CREAT SERPL-MCNC: 0.85 MG/DL (ref 0.51–0.95)
ERYTHROCYTE [DISTWIDTH] IN BLOOD BY AUTOMATED COUNT: 12.8 % (ref 10–15)
GFR SERPL CREATININE-BSD FRML MDRD: 76 ML/MIN/1.73M2
HCT VFR BLD AUTO: 40.5 % (ref 35–47)
HGB BLD-MCNC: 12.6 G/DL (ref 11.7–15.7)
MCH RBC QN AUTO: 29.7 PG (ref 26.5–33)
MCHC RBC AUTO-ENTMCNC: 31.1 G/DL (ref 31.5–36.5)
MCV RBC AUTO: 96 FL (ref 78–100)
PLATELET # BLD AUTO: 312 10E3/UL (ref 150–450)
RBC # BLD AUTO: 4.24 10E6/UL (ref 3.8–5.2)
WBC # BLD AUTO: 6.7 10E3/UL (ref 4–11)

## 2023-04-03 PROCEDURE — 82040 ASSAY OF SERUM ALBUMIN: CPT

## 2023-04-03 PROCEDURE — 82565 ASSAY OF CREATININE: CPT

## 2023-04-03 PROCEDURE — 85027 COMPLETE CBC AUTOMATED: CPT

## 2023-04-03 PROCEDURE — 36415 COLL VENOUS BLD VENIPUNCTURE: CPT

## 2023-04-03 PROCEDURE — 84460 ALANINE AMINO (ALT) (SGPT): CPT

## 2023-04-05 NOTE — TELEPHONE ENCOUNTER
Dr. Ramirez's from 's eye clinic was returning Liz/ Mireya ndiaye;  Writer unable to get a hold of nurses.    Please contact Dr. Ramirez back at his cell [phone]; ok to leave a detailed message if it goes to a .

## 2023-04-05 NOTE — TELEPHONE ENCOUNTER
Spoke to eye clinic they will have the eye provider call us back to verify if okay to continue plaquenil. Received eye exam no mention of plaquenil on report.

## 2023-04-05 NOTE — TELEPHONE ENCOUNTER
Spoke to pt's eye clinic- receive prescription but no exam. They will fax over exam in the next 30 min. Pt last seen 9/2022

## 2023-04-10 RX ORDER — HYDROXYCHLOROQUINE SULFATE 200 MG/1
TABLET, FILM COATED ORAL
Qty: 180 TABLET | Refills: 0 | OUTPATIENT
Start: 2023-04-10

## 2023-06-21 ENCOUNTER — OFFICE VISIT (OUTPATIENT)
Dept: RHEUMATOLOGY | Facility: CLINIC | Age: 68
End: 2023-06-21
Payer: COMMERCIAL

## 2023-06-21 VITALS — HEART RATE: 72 BPM | DIASTOLIC BLOOD PRESSURE: 82 MMHG | SYSTOLIC BLOOD PRESSURE: 128 MMHG

## 2023-06-21 DIAGNOSIS — M17.10 UNILATERAL PRIMARY OSTEOARTHRITIS, UNSPECIFIED KNEE: ICD-10-CM

## 2023-06-21 DIAGNOSIS — R76.8 CYCLIC CITRULLINATED PEPTIDE (CCP) ANTIBODY POSITIVE: ICD-10-CM

## 2023-06-21 DIAGNOSIS — Z79.899 HIGH RISK MEDICATION USE: ICD-10-CM

## 2023-06-21 DIAGNOSIS — M05.79 SEROPOSITIVE RHEUMATOID ARTHRITIS OF MULTIPLE SITES (H): Primary | ICD-10-CM

## 2023-06-21 PROCEDURE — 99214 OFFICE O/P EST MOD 30 MIN: CPT | Performed by: INTERNAL MEDICINE

## 2023-06-21 RX ORDER — HYDROXYCHLOROQUINE SULFATE 200 MG/1
200 TABLET, FILM COATED ORAL 2 TIMES DAILY
Qty: 180 TABLET | Refills: 1 | Status: SHIPPED | OUTPATIENT
Start: 2023-06-21 | End: 2023-12-11

## 2023-06-21 NOTE — PROGRESS NOTES
Rheumatology follow-up visit note     Elza is a 67 year old female presents today for follow-up.    Elza was seen today for recheck.    Diagnoses and all orders for this visit:    Seropositive rheumatoid arthritis of multiple sites (H)  -     hydroxychloroquine (PLAQUENIL) 200 MG tablet; Take 1 tablet (200 mg) by mouth 2 times daily for 90 days    Cyclic citrullinated peptide (CCP) antibody positive  -     hydroxychloroquine (PLAQUENIL) 200 MG tablet; Take 1 tablet (200 mg) by mouth 2 times daily for 90 days    High risk medication use    Osteoarthritis Of The Knee        She continues to do well with current dose of hydroxychloroquine reminded of eye examination.  Most recent labs from April within acceptable range.  We will meet here in 6 months with labs prior.    Follow up in 6 months.    HPI    Elza Polk is a 67 year old female is here for follow-up of rheumatoid arthritis.  This is seropositive.  Polyarticular longstanding.  At 1 point she had tried to be off all medications including hydroxychloroquine and had a relapse of her symptoms.  She has tolerated restarting the hydroxychloroquine very well.  No GI symptoms cutaneous issues.  She is aware of the eye examination as she had been in the past on it for years.  Recent labs are within acceptable range..  She is no longer having any further palindrome's of rheumatoid arthritis.  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  She exercises both aerobic and strength training regularly.       Following is the excerpt from a previous note: In April during her visit she had indicated a desire to drop the remaining hydroxychloroquine.  Which she did.  A week and a half ago she started experiencing pain.  This is a migratory process.  It goes from 1 joint to the other such as the hands shoulders the knees, lasting a few hours to a day associated with swelling and then resolution of symptoms to spontaneously.  For example  "the other day she could not have \"lifted\" her left above her head.  Today she has more pain in her right knee.  She recalls that several years ago when she started following up.  That is how her RA had presented there. As noted on her previous visit, she had developed breast cancer.  She status post lumpectomy and radiation for this.    She has observed swelling status in the first MCP area right knee the wrists.  And again that too is intermittent.          DETAILED EXAMINATION  06/21/23  :    Vitals:    06/21/23 0914   BP: 128/82   BP Location: Right arm   Patient Position: Sitting   Cuff Size: Adult Regular   Pulse: 72     Alert oriented. Head including the face is examined for malar rash, heliotropes, scarring, lupus pernio. Eyes examined for redness such as in episcleritis/scleritis, periorbital lesions.   Neck/ Face examined for parotid gland swelling, range of motion of neck.  Left upper and lower and right upper and lower extremities examined for tenderness, swelling, warmth of the appendicular joints, range of motion, edema, rash.  Some of the important findings included: she does not have evidence of synovitis in the palpable joints of the upper extremities.  No significant deformities of the digits.  Knees without effusion warmth or joint line tenderness.  Patient Active Problem List    Diagnosis Date Noted     Allergy to penicillin 12/01/2020     Priority: Medium     Malignant neoplasm of central portion of left breast in female, estrogen receptor positive (H) 09/16/2020     Priority: Medium     Added automatically from request for surgery [id number]         Lumbar paraspinal muscle spasm 09/17/2018     Priority: Medium     Trochanteric bursitis, left hip 08/28/2018     Priority: Medium     Menopause 06/29/2018     Priority: Medium     S/P total hysterectomy 06/29/2018     Priority: Medium     Done for fibroid         Cyclic citrullinated peptide (CCP) antibody positive 07/26/2017     Priority: Medium     " Osteoarthritis Of The Knee      Priority: Medium     Created by Conversion  Replacement Utility updated for latest IMO load    Replacing diagnoses that were inactivated after the 10/1/2021 regulatory import.       Seropositive rheumatoid arthritis of multiple sites (H) 03/30/2016     Priority: Medium     Health care maintenance 02/04/2016     Priority: Medium     High risk medication use 10/05/2015     Priority: Medium     Ulnar neuropathy 06/15/2015     Priority: Medium     Past Surgical History:   Procedure Laterality Date     HYSTERECTOMY  2008     OOPHORECTOMY  2008     [place] MASTECTOMY, PARTIAL Left 9/23/2020    Procedure: Left Lumpectomy after Wire Loclaization; [place] Lymph Node Biopsy;  Surgeon: Анна Coffey MD;  Location: [facility];  Service: General     US BIOPSY FINE NEEDLE ASPIRATION LYMPH NODE (BREAST) LEFT Left 9/3/2020     US BREAST CORE BIOPSY LEFT Left 9/3/2020     US BREAST LOC W SENT NODE INJ LEFT Left 9/23/2020     [facility] FREEING BOWEL ADHESION,ENTEROLYSIS      Description: Laparoscopic Lysis Of Intestinal Adhesions;  Recorded: 08/22/2008;     [facility] TOTAL ABDOM HYSTERECTOMY      Description: Total Abdominal Hysterectomy;  Proc Date: 01/01/2005;  Comments: for uterine fibroids and adhesions; ovaries are gone, too      Past Medical History:   Diagnosis Date     H/O small bowel obstruction      Rheumatoid arthritis (H)      No Known Allergies  Current Outpatient Medications   Medication Sig Dispense Refill     anastrozole (ARIMIDEX) 1 mg tablet [ANASTROZOLE (ARIMIDEX) 1 MG TABLET] Take 1 mg by mouth daily.       CALCIUM CARBONATE/VITAMIN D3 (CALCIUM+D ORAL) [CALCIUM CARBONATE/VITAMIN D3 (CALCIUM+D ORAL)] Take 1 tablet by mouth daily.       hydroxychloroquine (PLAQUENIL) 200 MG tablet TAKE 2 TABLETS(400 MG) BY MOUTH DAILY 180 tablet 0     multivitamin (ONE A DAY) per tablet [MULTIVITAMIN (ONE A DAY) PER TABLET] Take 1 tablet by mouth daily.       family history includes Anxiety Disorder in her  son and son; Breast Cancer in her cousin; Cerebrovascular Disease in her mother; Coronary Artery Disease in her father; Diabetes in her mother; Hypertension in her father and mother; Mental Illness in her sister; Other Cancer in her cousin; Prostate Cancer in her father.  Social Connections: Not on file          WBC Count   Date Value Ref Range Status   04/03/2023 6.7 4.0 - 11.0 10e3/uL Final     RBC Count   Date Value Ref Range Status   04/03/2023 4.24 3.80 - 5.20 10e6/uL Final     Hemoglobin   Date Value Ref Range Status   04/03/2023 12.6 11.7 - 15.7 g/dL Final     Hematocrit   Date Value Ref Range Status   04/03/2023 40.5 35.0 - 47.0 % Final     MCV   Date Value Ref Range Status   04/03/2023 96 78 - 100 fL Final     MCH   Date Value Ref Range Status   04/03/2023 29.7 26.5 - 33.0 pg Final     Platelet Count   Date Value Ref Range Status   04/03/2023 312 150 - 450 10e3/uL Final     % Lymphocytes   Date Value Ref Range Status   08/31/2020 34 20 - 40 % Final     AST   Date Value Ref Range Status   08/23/2022 31 10 - 35 U/L Final     ALT   Date Value Ref Range Status   04/03/2023 22 10 - 35 U/L Final     Albumin   Date Value Ref Range Status   04/03/2023 4.3 3.5 - 5.2 g/dL Final   03/30/2022 3.8 3.5 - 5.0 g/dL Final     Alkaline Phosphatase   Date Value Ref Range Status   08/23/2022 80 35 - 104 U/L Final     Creatinine   Date Value Ref Range Status   04/03/2023 0.85 0.51 - 0.95 mg/dL Final     GFR Estimate   Date Value Ref Range Status   04/03/2023 76 >60 mL/min/1.73m2 Final     Comment:     eGFR calculated using 2021 CKD-EPI equation.   06/23/2021 >60 >60 mL/min/1.73m2 Final     GFR Estimate If Black   Date Value Ref Range Status   06/23/2021 >60 >60 mL/min/1.73m2 Final     Creatinine Urine mg/dL (External)   Date Value Ref Range Status   03/13/2018 240.4 mg/dL Final

## 2023-06-23 ENCOUNTER — OFFICE VISIT (OUTPATIENT)
Dept: ORTHOPEDICS | Facility: CLINIC | Age: 68
End: 2023-06-23
Payer: COMMERCIAL

## 2023-06-23 DIAGNOSIS — G56.01 CARPAL TUNNEL SYNDROME OF RIGHT WRIST: Primary | ICD-10-CM

## 2023-06-23 PROCEDURE — 99213 OFFICE O/P EST LOW 20 MIN: CPT | Performed by: ORTHOPAEDIC SURGERY

## 2023-06-23 NOTE — LETTER
6/23/2023         RE: Elza Polk  [address]  [place] [address]        Dear Colleague,    Thank you for referring your patient, Elza Polk, to the [facility] [place]. Please see a copy of my visit note below.    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  After injection last visit no symptoms.         Patient Active Problem List   Diagnosis    Osteoarthritis Of The Knee    Ulnar neuropathy    High risk medication use    Health care maintenance    Seropositive rheumatoid arthritis of multiple sites (H)    Cyclic citrullinated peptide (CCP) antibody positive    Menopause    S/P total hysterectomy    Trochanteric bursitis, left hip    Lumbar paraspinal muscle spasm    Malignant neoplasm of central portion of left breast in female, estrogen receptor positive (H)    Allergy to penicillin            Past Medical History:   Diagnosis Date    H/O small bowel obstruction     Rheumatoid arthritis (H)             Past Surgical History:   Procedure Laterality Date    HYSTERECTOMY  2008    OOPHORECTOMY  2008    [place] MASTECTOMY, PARTIAL Left 9/23/2020    Procedure: Left Lumpectomy after Wire Loclaization; [place] Lymph Node Biopsy;  Surgeon: Анна Coffey MD;  Location: [facility];  Service: General    US BIOPSY FINE NEEDLE ASPIRATION LYMPH NODE (BREAST) LEFT Left 9/3/2020    US BREAST CORE BIOPSY LEFT Left 9/3/2020    US BREAST LOC W SENT NODE INJ LEFT Left 9/23/2020    [facility] FREEING BOWEL ADHESION,ENTEROLYSIS      Description: Laparoscopic Lysis Of Intestinal Adhesions;  Recorded: 08/22/2008;    [facility] TOTAL ABDOM HYSTERECTOMY      Description: Total Abdominal Hysterectomy;  Proc Date: 01/01/2005;  Comments: for uterine fibroids and adhesions; ovaries are gone, too            Social History     Tobacco Use    Smoking status: Former    Smokeless tobacco: Never   Substance Use  Topics    Alcohol use: Yes     Comment: Alcoholic Drinks/day: occasional             Family History   Problem Relation Age of Onset    Prostate Cancer Father     Coronary Artery Disease Father     Hypertension Father     Hypertension Mother     Diabetes Mother     Cerebrovascular Disease Mother     Breast Cancer Cousin     Other Cancer Cousin     Anxiety Disorder Son     Anxiety Disorder Son     Mental Illness Sister              No Known Allergies         Current Outpatient Medications   Medication Sig Dispense Refill    anastrozole (ARIMIDEX) 1 mg tablet [ANASTROZOLE (ARIMIDEX) 1 MG TABLET] Take 1 mg by mouth daily.      CALCIUM CARBONATE/VITAMIN D3 (CALCIUM+D ORAL) [CALCIUM CARBONATE/VITAMIN D3 (CALCIUM+D ORAL)] Take 1 tablet by mouth daily.      hydroxychloroquine (PLAQUENIL) 200 MG tablet Take 1 tablet (200 mg) by mouth 2 times daily for 90 days 180 tablet 1    multivitamin (ONE A DAY) per tablet [MULTIVITAMIN (ONE A DAY) PER TABLET] Take 1 tablet by mouth daily.            Review Of Systems  Skin: negative  Eyes: negative  Ears/Nose/Throat: negative  Respiratory: No shortness of breath, dyspnea on exertion, cough, or hemoptysis    O: Physical Exam:  Well healed incisions L wrist/forearm after fixation wrist fracture.  R wrist without effusion, adequate motion present, negative tinel's.    Lab:         Based upon the nerve conduction studies the patient has a well defined median neuropathy at the wrist as specified within this report. In accordance with AANEM and CMS policies no needle EMG was performed.     Interpretation:     Abnormal study. There is electrodiagnostic evidence of a right median neuropathy at the wrist, as seen in carpal tunnel syndrome, of moderate severity.     Images:   Narrative & Impression   3 views right wrist radiographs 10/28/2022 2:59 PM     History: Right wrist pain; Right carpal tunnel syndrome     Comparison: None available.     Findings:     PA, oblique and lateral view(s) of the  right wrist were obtained.      No acute osseous abnormality.  No erosion.     No substantial degenerative change.     Blunted ulnar styloid. Positive ulnar variance.     Soft tissue is unremarkable.                                                                      Impression:  1. No acute osseous abnormality.  2. No substantial degenerative change.          A:  Improvement sxs associated with CTS R wrist    P:  Will use splint/brace for prophylaxis when doing heavy work/lifting/boxes.  Discussed NSIADs as well.  Discussed labs and images as well looking for associated comorbidities.  See back prn.  Will notify if exacerbation symptoms.           In addition to the above assessment and plan each active problem on Elza's problem list was evaluated today. This included the questioning of Elza for any medication problems. We will continue the current treatment plan for these active problems except as noted.        LUI KNOX MD

## 2023-06-23 NOTE — PROGRESS NOTES
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  After injection last visit no symptoms.         Patient Active Problem List   Diagnosis     Osteoarthritis Of The Knee     Ulnar neuropathy     High risk medication use     Health care maintenance     Seropositive rheumatoid arthritis of multiple sites (H)     Cyclic citrullinated peptide (CCP) antibody positive     Menopause     S/P total hysterectomy     Trochanteric bursitis, left hip     Lumbar paraspinal muscle spasm     Malignant neoplasm of central portion of left breast in female, estrogen receptor positive (H)     Allergy to penicillin            Past Medical History:   Diagnosis Date     H/O small bowel obstruction      Rheumatoid arthritis (H)             Past Surgical History:   Procedure Laterality Date     HYSTERECTOMY  2008     OOPHORECTOMY  2008     [place] MASTECTOMY, PARTIAL Left 9/23/2020    Procedure: Left Lumpectomy after Wire Loclaization; [place] Lymph Node Biopsy;  Surgeon: Анна Coffey MD;  Location: [facility];  Service: General     US BIOPSY FINE NEEDLE ASPIRATION LYMPH NODE (BREAST) LEFT Left 9/3/2020      BREAST CORE BIOPSY LEFT Left 9/3/2020     US BREAST LOC W SENT NODE INJ LEFT Left 9/23/2020     [facility] FREEING BOWEL ADHESION,ENTEROLYSIS      Description: Laparoscopic Lysis Of Intestinal Adhesions;  Recorded: 08/22/2008;     [facility] TOTAL ABDOM HYSTERECTOMY      Description: Total Abdominal Hysterectomy;  Proc Date: 01/01/2005;  Comments: for uterine fibroids and adhesions; ovaries are gone, too            Social History     Tobacco Use     Smoking status: Former     Smokeless tobacco: Never   Substance Use Topics     Alcohol use: Yes     Comment: Alcoholic Drinks/day: occasional             Family History   Problem Relation Age of Onset     Prostate Cancer Father      Coronary Artery Disease Father      Hypertension Father      Hypertension Mother       Diabetes Mother      Cerebrovascular Disease Mother      Breast Cancer Cousin      Other Cancer Cousin      Anxiety Disorder Son      Anxiety Disorder Son      Mental Illness Sister              No Known Allergies         Current Outpatient Medications   Medication Sig Dispense Refill     anastrozole (ARIMIDEX) 1 mg tablet [ANASTROZOLE (ARIMIDEX) 1 MG TABLET] Take 1 mg by mouth daily.       CALCIUM CARBONATE/VITAMIN D3 (CALCIUM+D ORAL) [CALCIUM CARBONATE/VITAMIN D3 (CALCIUM+D ORAL)] Take 1 tablet by mouth daily.       hydroxychloroquine (PLAQUENIL) 200 MG tablet Take 1 tablet (200 mg) by mouth 2 times daily for 90 days 180 tablet 1     multivitamin (ONE A DAY) per tablet [MULTIVITAMIN (ONE A DAY) PER TABLET] Take 1 tablet by mouth daily.            Review Of Systems  Skin: negative  Eyes: negative  Ears/Nose/Throat: negative  Respiratory: No shortness of breath, dyspnea on exertion, cough, or hemoptysis    O: Physical Exam:  Well healed incisions L wrist/forearm after fixation wrist fracture.  R wrist without effusion, adequate motion present, negative tinel's.    Lab:         Based upon the nerve conduction studies the patient has a well defined median neuropathy at the wrist as specified within this report. In accordance with AANEM and CMS policies no needle EMG was performed.     Interpretation:     Abnormal study. There is electrodiagnostic evidence of a right median neuropathy at the wrist, as seen in carpal tunnel syndrome, of moderate severity.     Images:   Narrative & Impression   3 views right wrist radiographs 10/28/2022 2:59 PM     History: Right wrist pain; Right carpal tunnel syndrome     Comparison: None available.     Findings:     PA, oblique and lateral view(s) of the right wrist were obtained.      No acute osseous abnormality.  No erosion.     No substantial degenerative change.     Blunted ulnar styloid. Positive ulnar variance.     Soft tissue is unremarkable.                                                                       Impression:  1. No acute osseous abnormality.  2. No substantial degenerative change.          A:  Improvement sxs associated with CTS R wrist    P:  Will use splint/brace for prophylaxis when doing heavy work/lifting/boxes.  Discussed NSIADs as well.  Discussed labs and images as well looking for associated comorbidities.  See back prn.  Will notify if exacerbation symptoms.           In addition to the above assessment and plan each active problem on Elza's problem list was evaluated today. This included the questioning of Elza for any medication problems. We will continue the current treatment plan for these active problems except as noted.

## 2023-06-23 NOTE — NURSING NOTE
Reason For Visit:   Chief Complaint   Patient presents with     RECHECK     Patient returns for 3m follow up right wrist.  Injection from previous visit is still providing relief.  Patient reports 100% pain/Sx relief from injection.       There were no vitals taken for this visit.    Pain Assessment  Patient Currently in Pain: No    Eamon Arceo, ATC

## 2023-07-05 ENCOUNTER — TRANSFERRED RECORDS (OUTPATIENT)
Dept: HEALTH INFORMATION MANAGEMENT | Facility: CLINIC | Age: 68
End: 2023-07-05
Payer: COMMERCIAL

## 2023-07-10 ENCOUNTER — IMMUNIZATION (OUTPATIENT)
Dept: FAMILY MEDICINE | Facility: CLINIC | Age: 68
End: 2023-07-10
Payer: COMMERCIAL

## 2023-07-10 DIAGNOSIS — Z23 ENCOUNTER FOR IMMUNIZATION: Primary | ICD-10-CM

## 2023-07-10 PROCEDURE — 0134A COVID-19 BIVALENT 18+ (MODERNA): CPT

## 2023-07-10 PROCEDURE — 91313 COVID-19 BIVALENT 18+ (MODERNA): CPT

## 2023-07-10 PROCEDURE — 99207 PR NO CHARGE NURSE ONLY: CPT

## 2023-07-10 NOTE — PROGRESS NOTES
Prior to immunization administration, verified patients identity using patient s name and date of birth. Please see Immunization Activity for additional information.     Screening Questionnaire for Adult Immunization    Are you sick today?   No   Do you have allergies to medications, food, a vaccine component or latex?   No   Have you ever had a serious reaction after receiving a vaccination?   No   Do you have a long-term health problem with heart, lung, kidney, or metabolic disease (e.g., diabetes), asthma, a blood disorder, no spleen, complement component deficiency, a cochlear implant, or a spinal fluid leak?  Are you on long-term aspirin therapy?   No   Do you have cancer, leukemia, HIV/AIDS, or any other immune system problem?   No   Do you have a parent, brother, or sister with an immune system problem?   No   In the past 3 months, have you taken medications that affect  your immune system, such as prednisone, other steroids, or anticancer drugs; drugs for the treatment of rheumatoid arthritis, Crohn s disease, or psoriasis; or have you had radiation treatments?   No   Have you had a seizure, or a brain or other nervous system problem?   No   During the past year, have you received a transfusion of blood or blood    products, or been given immune (gamma) globulin or antiviral drug?   No   For women: Are you pregnant or is there a chance you could become       pregnant during the next month?   No   Have you received any vaccinations in the past 4 weeks?   No     Immunization questionnaire answers were all negative.    I have reviewed the following standing orders:   This patient is due and qualifies for the Covid-19 vaccine.     Click here for COVID-19 Standing Order    I have reviewed the vaccines inclusion and exclusion criteria; No concerns regarding eligibility.     Patient instructed to remain in clinic for 15 minutes afterwards, and to report any adverse reactions.     Screening performed by Gladys  Ann-Marie on 7/10/2023 at 9:14 AM.

## 2023-07-11 ENCOUNTER — TRANSFERRED RECORDS (OUTPATIENT)
Dept: HEALTH INFORMATION MANAGEMENT | Facility: CLINIC | Age: 68
End: 2023-07-11
Payer: COMMERCIAL

## 2023-07-25 ENCOUNTER — OFFICE VISIT (OUTPATIENT)
Dept: FAMILY MEDICINE | Facility: CLINIC | Age: 68
End: 2023-07-25
Payer: COMMERCIAL

## 2023-07-25 VITALS
BODY MASS INDEX: 29.18 KG/M2 | DIASTOLIC BLOOD PRESSURE: 72 MMHG | TEMPERATURE: 97.3 F | WEIGHT: 197 LBS | HEART RATE: 67 BPM | RESPIRATION RATE: 18 BRPM | OXYGEN SATURATION: 97 % | SYSTOLIC BLOOD PRESSURE: 115 MMHG | HEIGHT: 69 IN

## 2023-07-25 DIAGNOSIS — Z17.0 MALIGNANT NEOPLASM OF CENTRAL PORTION OF LEFT BREAST IN FEMALE, ESTROGEN RECEPTOR POSITIVE (H): ICD-10-CM

## 2023-07-25 DIAGNOSIS — C50.112 MALIGNANT NEOPLASM OF CENTRAL PORTION OF LEFT BREAST IN FEMALE, ESTROGEN RECEPTOR POSITIVE (H): ICD-10-CM

## 2023-07-25 DIAGNOSIS — L84 CORN OR CALLUS: Primary | ICD-10-CM

## 2023-07-25 PROCEDURE — 99213 OFFICE O/P EST LOW 20 MIN: CPT | Performed by: FAMILY MEDICINE

## 2023-07-25 NOTE — PROGRESS NOTES
"  1. Corn or callus  This is a 66 yo female with painful lump on plantar surface of left foot - this appears to be a corn with callus formation.  This was pared down with Mariel - discussed options for further treatment.  She will think about it.  She'll let me know if she wants to see Podiatry for further intervention.      2. Malignant neoplasm of central portion of left breast in female, estrogen receptor positive (H)  Patient has h/o left breast cancer - no sign of recurrence.        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        Review of Systems   Musculoskeletal:         Foot pain - left plantar foot - hard lump      All other systems reviewed and are negative.           Objective    /72 (BP Location: Right arm, Patient Position: Sitting, Cuff Size: Adult Regular)   Pulse 67   Temp 97.3  F (36.3  C) (Temporal)   Resp 18   Ht 1.74 m (5' 8.5\")   Wt 89.4 kg (197 lb)   LMP  (LMP Unknown)   SpO2 97%   BMI 29.51 kg/m    Body mass index is 29.51 kg/m .  Physical Exam  Vitals reviewed.   Constitutional:       General: She is not in acute distress.     Appearance: Normal appearance.   HENT:      Head: Normocephalic.      Right Ear: Tympanic membrane, ear canal and external ear normal.      Left Ear: Tympanic membrane, ear canal and external ear normal.      Nose: Nose normal.      Mouth/Throat:      Mouth: Mucous membranes are moist.      Pharynx: No posterior oropharyngeal erythema.   Eyes:      Extraocular Movements: Extraocular movements intact.      Conjunctiva/sclera: " Conjunctivae normal.      Pupils: Pupils are equal, round, and reactive to light.   Cardiovascular:      Rate and Rhythm: Normal rate and regular rhythm.   Pulmonary:      Effort: Pulmonary effort is normal.      Breath sounds: Normal breath sounds.   Musculoskeletal:         General: No deformity. Normal range of motion.      Cervical back: Normal range of motion and neck supple.      Comments: Firm lump on plantar surface left foot - consistent with corn/callus formation - this was trimmed down and patient reports some relief of discomfort   Lymphadenopathy:      Cervical: No cervical adenopathy.   Skin:     General: Skin is warm and dry.   Neurological:      General: No focal deficit present.      Mental Status: She is alert.   Psychiatric:         Mood and Affect: Mood normal.         Behavior: Behavior normal.            No results found for any visits on 07/25/23.          Prior to immunization administration, verified patients identity using patient s name and date of birth. Please see Immunization Activity for additional information.     Screening Questionnaire for Adult Immunization    Are you sick today?   No   Do you have allergies to medications, food, a vaccine component or latex?   No   Have you ever had a serious reaction after receiving a vaccination?   No   Do you have a long-term health problem with heart, lung, kidney, or metabolic disease (e.g., diabetes), asthma, a blood disorder, no spleen, complement component deficiency, a cochlear implant, or a spinal fluid leak?  Are you on long-term aspirin therapy?   No   Do you have cancer, leukemia, HIV/AIDS, or any other immune system problem?   No   Do you have a parent, brother, or sister with an immune system problem?   No   In the past 3 months, have you taken medications that affect  your immune system, such as prednisone, other steroids, or anticancer drugs; drugs for the treatment of rheumatoid arthritis, Crohn s disease, or psoriasis; or have  you had radiation treatments?   No   Have you had a seizure, or a brain or other nervous system problem?   No   During the past year, have you received a transfusion of blood or blood    products, or been given immune (gamma) globulin or antiviral drug?   No   For women: Are you pregnant or is there a chance you could become       pregnant during the next month?   No   Have you received any vaccinations in the past 4 weeks?   No     Immunization questionnaire answers were all negative.      Patient instructed to remain in clinic for 15 minutes afterwards, and to report any adverse reactions.     Screening performed by Aliza Mota CMA on 7/25/2023 at 7:54 AM.

## 2023-08-01 ENCOUNTER — OFFICE VISIT (OUTPATIENT)
Dept: FAMILY MEDICINE | Facility: CLINIC | Age: 68
End: 2023-08-01
Payer: COMMERCIAL

## 2023-08-01 VITALS
RESPIRATION RATE: 16 BRPM | SYSTOLIC BLOOD PRESSURE: 120 MMHG | BODY MASS INDEX: 30.01 KG/M2 | HEIGHT: 68 IN | TEMPERATURE: 97.9 F | WEIGHT: 198 LBS | DIASTOLIC BLOOD PRESSURE: 64 MMHG | HEART RATE: 65 BPM | OXYGEN SATURATION: 100 %

## 2023-08-01 DIAGNOSIS — R30.0 DYSURIA: ICD-10-CM

## 2023-08-01 DIAGNOSIS — N89.8 VAGINAL DISCHARGE: Primary | ICD-10-CM

## 2023-08-01 LAB
ALBUMIN UR-MCNC: NEGATIVE MG/DL
APPEARANCE UR: CLEAR
BILIRUB UR QL STRIP: NEGATIVE
CLUE CELLS: PRESENT
COLOR UR AUTO: YELLOW
GLUCOSE UR STRIP-MCNC: NEGATIVE MG/DL
HGB UR QL STRIP: NEGATIVE
KETONES UR STRIP-MCNC: NEGATIVE MG/DL
LEUKOCYTE ESTERASE UR QL STRIP: NEGATIVE
NITRATE UR QL: NEGATIVE
PH UR STRIP: 6 [PH] (ref 5–8)
SP GR UR STRIP: >=1.03 (ref 1–1.03)
TRICHOMONAS, WET PREP: ABNORMAL
UROBILINOGEN UR STRIP-ACNC: 0.2 E.U./DL
WBC'S/HIGH POWER FIELD, WET PREP: ABNORMAL
YEAST, WET PREP: ABNORMAL

## 2023-08-01 PROCEDURE — 87591 N.GONORRHOEAE DNA AMP PROB: CPT | Performed by: PHYSICIAN ASSISTANT

## 2023-08-01 PROCEDURE — 87491 CHLMYD TRACH DNA AMP PROBE: CPT | Performed by: PHYSICIAN ASSISTANT

## 2023-08-01 PROCEDURE — 81003 URINALYSIS AUTO W/O SCOPE: CPT | Performed by: PHYSICIAN ASSISTANT

## 2023-08-01 PROCEDURE — 87086 URINE CULTURE/COLONY COUNT: CPT | Performed by: PHYSICIAN ASSISTANT

## 2023-08-01 PROCEDURE — 99213 OFFICE O/P EST LOW 20 MIN: CPT | Performed by: PHYSICIAN ASSISTANT

## 2023-08-01 PROCEDURE — 87210 SMEAR WET MOUNT SALINE/INK: CPT | Performed by: PHYSICIAN ASSISTANT

## 2023-08-01 RX ORDER — METRONIDAZOLE 7.5 MG/G
1 GEL VAGINAL AT BEDTIME
Qty: 45 G | Refills: 0 | Status: SHIPPED | OUTPATIENT
Start: 2023-08-01 | End: 2023-08-06

## 2023-08-01 NOTE — PROGRESS NOTES
Subjective:      Elza Polk is a 67 year old female with chief complaint of vaginal discharge.  She has had vaginal discharge and odor for about 1 week.  Slightly burning with urination.  Staying the same.  No blood in her urine.  Not taking any over-the-counter medications for her symptoms.  No recent antibiotic use.  She cannot think of any other changes that could have accounted for her symptoms.  Has had total hysterectomy for fibroids.    Patient Active Problem List   Diagnosis    Osteoarthritis Of The Knee    Ulnar neuropathy    High risk medication use    Health care maintenance    Seropositive rheumatoid arthritis of multiple sites (H)    Cyclic citrullinated peptide (CCP) antibody positive    Menopause    S/P total hysterectomy    Trochanteric bursitis, left hip    Lumbar paraspinal muscle spasm    Malignant neoplasm of central portion of left breast in female, estrogen receptor positive (H)    Allergy to penicillin        Current Outpatient Medications:     anastrozole (ARIMIDEX) 1 mg tablet, [ANASTROZOLE (ARIMIDEX) 1 MG TABLET] Take 1 mg by mouth daily., Disp: , Rfl:     CALCIUM CARBONATE/VITAMIN D3 (CALCIUM+D ORAL), [CALCIUM CARBONATE/VITAMIN D3 (CALCIUM+D ORAL)] Take 1 tablet by mouth daily., Disp: , Rfl:     hydroxychloroquine (PLAQUENIL) 200 MG tablet, Take 1 tablet (200 mg) by mouth 2 times daily for 90 days, Disp: 180 tablet, Rfl: 1    metroNIDAZOLE (METROGEL) 0.75 % vaginal gel, Place 1 applicator (5 g) vaginally At Bedtime for 5 days quantity sufficient for 5 day treatment, Disp: 45 g, Rfl: 0    multivitamin (ONE A DAY) per tablet, [MULTIVITAMIN (ONE A DAY) PER TABLET] Take 1 tablet by mouth daily., Disp: , Rfl:     Current Facility-Administered Medications:     2 mL bupivacaine (MARCAINE) preservative free injection 0.25% (10 mL vial), 2 mL, , , Eamon Diaz MD, 2 mL at 03/17/23 1433    lidocaine (PF) (XYLOCAINE) 1 % injection 2 mL, 2 mL, , , Eamon Diaz MD, 2 mL at 03/17/23  "1433    triamcinolone (KENALOG-40) injection 40 mg, 40 mg, , , Eamon cuevas MD, 40 mg at 03/17/23 1433      Objective:     Allergies:  Patient has no known allergies.    /64 (BP Location: Left arm, Patient Position: Sitting, Cuff Size: Adult Large)   Pulse 65   Temp 97.9  F (36.6  C) (Temporal)   Resp 16   Ht 1.727 m (5' 8\")   Wt 89.8 kg (198 lb)   LMP  (LMP Unknown)   SpO2 100%   BMI 30.11 kg/m    Body mass index is 30.11 kg/m .    General: Alert and oriented x 3, in no apparent distress  Genitourinary: External genitalia is normal in appearance, vaginal walls are healthy, vaginal vault grossly normal, minimal amount of white discharge present    Recent Results (from the past 24 hour(s))   Urine Macroscopic with reflex to Microscopic    Collection Time: 08/01/23  8:30 AM   Result Value Ref Range    Color Urine Yellow Colorless, Straw, Light Yellow, Yellow    Appearance Urine Clear Clear    Glucose Urine Negative Negative mg/dL    Bilirubin Urine Negative Negative    Ketones Urine Negative Negative mg/dL    Specific Gravity Urine >=1.030 1.005 - 1.030    Blood Urine Negative Negative    pH Urine 6.0 5.0 - 8.0    Protein Albumin Urine Negative Negative mg/dL    Urobilinogen Urine 0.2 0.2, 1.0 E.U./dL    Nitrite Urine Negative Negative    Leukocyte Esterase Urine Negative Negative   Wet preparation    Collection Time: 08/01/23  8:33 AM    Specimen: Vagina; Swab   Result Value Ref Range    Trichomonas Absent Absent    Yeast Absent Absent    Clue Cells Present (A) Absent    WBCs/high power field 3+ (A) None     Other labs pending.      Assessment and Plan:     1. Vaginal discharge  New problem.  Wet prep was positive for BV.  Patient will be informed of results.  Given her other medications, I would recommend a trial of MetroGel first.  Could consider oral or nasal pills in the future if gel is not effective.  - Wet preparation  - metroNIDAZOLE (METROGEL) 0.75 % vaginal gel; Place 1 applicator (5 g) " vaginally At Bedtime for 5 days quantity sufficient for 5 day treatment  Dispense: 45 g; Refill: 0    2. Dysuria  New problem.  Most likely related to vaginal discharge as above.  Urinalysis today completely normal.  I will follow-up with urine culture and treat as appropriate.  - Urine Macroscopic with reflex to Microscopic; Future  - Urine Culture; Future  - Chlamydia trachomatis/Neisseria gonorrhoeae by PCR  - Urine Macroscopic with reflex to Microscopic  - Urine Culture        This dictation uses voice recognition software, which may contain typographical errors.

## 2023-08-02 LAB
C TRACH DNA SPEC QL PROBE+SIG AMP: NEGATIVE
N GONORRHOEA DNA SPEC QL NAA+PROBE: NEGATIVE

## 2023-08-03 LAB — BACTERIA UR CULT: NO GROWTH

## 2023-08-14 ENCOUNTER — TELEPHONE (OUTPATIENT)
Dept: FAMILY MEDICINE | Facility: CLINIC | Age: 68
End: 2023-08-14
Payer: COMMERCIAL

## 2023-08-14 NOTE — TELEPHONE ENCOUNTER
Patient called to report she saw Viviane two weeks ago. A prescription for vaginal cream was given. Completed treatment, however, still having symptoms.     - frequent urge to urinate, mild burning pain.  - yellow gold color urine with odor.   - no blood in urine.     Patient is requesting an office visit appointment.  An appt made with date, time and location confirmed.    ELLEN BeltranN, RN  [facility]

## 2023-08-15 ENCOUNTER — OFFICE VISIT (OUTPATIENT)
Dept: FAMILY MEDICINE | Facility: CLINIC | Age: 68
End: 2023-08-15
Payer: COMMERCIAL

## 2023-08-15 VITALS
TEMPERATURE: 97.1 F | OXYGEN SATURATION: 96 % | HEIGHT: 68 IN | WEIGHT: 189 LBS | BODY MASS INDEX: 28.64 KG/M2 | HEART RATE: 97 BPM | SYSTOLIC BLOOD PRESSURE: 116 MMHG | DIASTOLIC BLOOD PRESSURE: 74 MMHG | RESPIRATION RATE: 18 BRPM

## 2023-08-15 DIAGNOSIS — M79.672 BILATERAL FOOT PAIN: ICD-10-CM

## 2023-08-15 DIAGNOSIS — M79.671 BILATERAL FOOT PAIN: ICD-10-CM

## 2023-08-15 DIAGNOSIS — N89.8 VAGINAL DISCHARGE: ICD-10-CM

## 2023-08-15 DIAGNOSIS — L84 CORN OR CALLUS: ICD-10-CM

## 2023-08-15 DIAGNOSIS — B96.89 BACTERIAL VAGINOSIS: ICD-10-CM

## 2023-08-15 DIAGNOSIS — R35.0 URINARY FREQUENCY: Primary | ICD-10-CM

## 2023-08-15 DIAGNOSIS — N76.0 BACTERIAL VAGINOSIS: ICD-10-CM

## 2023-08-15 LAB
ALBUMIN UR-MCNC: NEGATIVE MG/DL
APPEARANCE UR: CLEAR
BACTERIA #/AREA URNS HPF: ABNORMAL /HPF
BILIRUB UR QL STRIP: NEGATIVE
CLUE CELLS: PRESENT
COLOR UR AUTO: YELLOW
GLUCOSE UR STRIP-MCNC: NEGATIVE MG/DL
HGB UR QL STRIP: ABNORMAL
KETONES UR STRIP-MCNC: NEGATIVE MG/DL
LEUKOCYTE ESTERASE UR QL STRIP: NEGATIVE
MUCOUS THREADS #/AREA URNS LPF: PRESENT /LPF
NITRATE UR QL: NEGATIVE
PH UR STRIP: 6 [PH] (ref 5–8)
RBC #/AREA URNS AUTO: ABNORMAL /HPF
SP GR UR STRIP: 1.02 (ref 1–1.03)
TRICHOMONAS, WET PREP: ABNORMAL
UROBILINOGEN UR STRIP-ACNC: 0.2 E.U./DL
WBC #/AREA URNS AUTO: ABNORMAL /HPF
WBC'S/HIGH POWER FIELD, WET PREP: ABNORMAL
YEAST, WET PREP: ABNORMAL

## 2023-08-15 PROCEDURE — 87086 URINE CULTURE/COLONY COUNT: CPT | Performed by: FAMILY MEDICINE

## 2023-08-15 PROCEDURE — 99213 OFFICE O/P EST LOW 20 MIN: CPT | Performed by: FAMILY MEDICINE

## 2023-08-15 PROCEDURE — 81001 URINALYSIS AUTO W/SCOPE: CPT | Performed by: FAMILY MEDICINE

## 2023-08-15 PROCEDURE — 87210 SMEAR WET MOUNT SALINE/INK: CPT | Performed by: FAMILY MEDICINE

## 2023-08-15 RX ORDER — METRONIDAZOLE 500 MG/1
500 TABLET ORAL 2 TIMES DAILY
Qty: 14 TABLET | Refills: 0 | Status: SHIPPED | OUTPATIENT
Start: 2023-08-15 | End: 2023-08-22

## 2023-08-15 NOTE — PROGRESS NOTES
1. Urinary frequency  Patient complains of urinary frequency - Reviewed UA - will treat if UC is positive.    - UA with Microscopic reflex to Culture; Future  - Urine Culture Aerobic Bacterial; Future  - UA with Microscopic reflex to Culture  - Urine Culture Aerobic Bacterial  - UA Microscopic with Reflex to Culture    2. Corn or callus  3. Bilateral foot pain  Patient has corn/callus on foot - we pared this down at her last visit; now she wants to see the Podiatrist for further evaluation due to ongoing bilateral foot pain as well.    - Orthopedic  Referral; Future    4. Vaginal discharge  Patient does have complaints of vaginal discharge as well - was just treated for bacterial vaginosis - will recheck wet prep.    - Wet prep - Clinic Collect    5. Bacterial vaginosis  Wet prep is positive for BV - will treat with Metronidazole - discussed.    - metroNIDAZOLE (FLAGYL) 500 MG tablet; Take 1 tablet (500 mg) by mouth 2 times daily for 7 days  Dispense: 14 tablet; Refill: 0      Subjective   Elza is a 68 year old, presenting for the following health issues:  Urinary Problem        8/15/2023     9:29 AM   Additional Questions   Roomed by Aliza       Dysuria/frequency/hesitancy   Urine is dark    Change in stools -   Normally, every day  Goes to bathroom b/c she thinks she needs to go , and sits there  Apples usually make her go - and they don't  Now, didn't have one yesterday - had a couple drops      History of Present Illness       Reason for visit:  Still having burning, frequency and urgency of urination. Vaginal discharge and odor ongoing also       Review of Systems   Constitutional:  Negative for activity change, appetite change, chills, fatigue, fever and unexpected weight change.   HENT: Negative.     Eyes:  Negative for visual disturbance.   Respiratory:  Negative for cough and shortness of breath.    Cardiovascular:  Negative for chest pain and peripheral edema.   Gastrointestinal: Negative.   "Negative for abdominal pain.   Endocrine: Negative for polydipsia and polyuria.   Genitourinary:  Positive for difficulty urinating and vaginal discharge (with odor).   Musculoskeletal: Negative.    Skin: Negative.    Allergic/Immunologic: Negative.    Neurological: Negative.  Negative for weakness.   Hematological:  Does not bruise/bleed easily.   Psychiatric/Behavioral: Negative.     All other systems reviewed and are negative.           Objective    /74 (BP Location: Right arm, Patient Position: Sitting, Cuff Size: Adult Large)   Pulse 97   Temp 97.1  F (36.2  C) (Temporal)   Resp 18   Ht 1.727 m (5' 8\")   Wt 85.7 kg (189 lb)   LMP  (LMP Unknown)   SpO2 96%   BMI 28.74 kg/m    Body mass index is 28.74 kg/m .  Physical Exam  Vitals reviewed.   Constitutional:       General: She is not in acute distress.     Appearance: Normal appearance.   HENT:      Head: Normocephalic.      Right Ear: Tympanic membrane, ear canal and external ear normal.      Left Ear: Tympanic membrane, ear canal and external ear normal.      Nose: Nose normal.      Mouth/Throat:      Mouth: Mucous membranes are moist.      Pharynx: No posterior oropharyngeal erythema.   Eyes:      Extraocular Movements: Extraocular movements intact.      Conjunctiva/sclera: Conjunctivae normal.      Pupils: Pupils are equal, round, and reactive to light.   Cardiovascular:      Rate and Rhythm: Normal rate and regular rhythm.      Pulses: Normal pulses.      Heart sounds: Normal heart sounds. No murmur heard.  Pulmonary:      Effort: Pulmonary effort is normal.      Breath sounds: Normal breath sounds.   Abdominal:      Palpations: Abdomen is soft. There is no mass.      Tenderness: There is no abdominal tenderness. There is no right CVA tenderness, left CVA tenderness, guarding or rebound.   Musculoskeletal:         General: No deformity. Normal range of motion.      Cervical back: Normal range of motion and neck supple.   Lymphadenopathy:      " Cervical: No cervical adenopathy.   Skin:     General: Skin is warm and dry.   Neurological:      General: No focal deficit present.      Mental Status: She is alert.   Psychiatric:         Mood and Affect: Mood normal.         Behavior: Behavior normal.            Results for orders placed or performed in visit on 08/15/23   UA with Microscopic reflex to Culture     Status: Abnormal    Specimen: Urine, Midstream   Result Value Ref Range    Color Urine Yellow Colorless, Straw, Light Yellow, Yellow    Appearance Urine Clear Clear    Glucose Urine Negative Negative mg/dL    Bilirubin Urine Negative Negative    Ketones Urine Negative Negative mg/dL    Specific Gravity Urine 1.020 1.005 - 1.030    Blood Urine Trace (A) Negative    pH Urine 6.0 5.0 - 8.0    Protein Albumin Urine Negative Negative mg/dL    Urobilinogen Urine 0.2 0.2, 1.0 E.U./dL    Nitrite Urine Negative Negative    Leukocyte Esterase Urine Negative Negative   UA Microscopic with Reflex to Culture     Status: Abnormal   Result Value Ref Range    Bacteria Urine Few (A) None Seen /HPF    RBC Urine 0-2 0-2 /HPF /HPF    WBC Urine 0-5 0-5 /HPF /HPF    Mucus Urine Present (A) None Seen /LPF    Narrative    Urine Culture not indicated   Wet prep - Clinic Collect     Status: Abnormal    Specimen: Vagina; Swab   Result Value Ref Range    Trichomonas Absent Absent    Yeast Absent Absent    Clue Cells Present (A) Absent    WBCs/high power field 3+ (A) None   Urine Culture Aerobic Bacterial     Status: None    Specimen: Urine, Midstream   Result Value Ref Range    Culture No Growth

## 2023-08-17 LAB — BACTERIA UR CULT: NO GROWTH

## 2023-08-20 ASSESSMENT — ENCOUNTER SYMPTOMS
CHILLS: 0
UNEXPECTED WEIGHT CHANGE: 0
COUGH: 0
MUSCULOSKELETAL NEGATIVE: 1
SHORTNESS OF BREATH: 0
ACTIVITY CHANGE: 0
ALLERGIC/IMMUNOLOGIC NEGATIVE: 1
FATIGUE: 0
APPETITE CHANGE: 0
PSYCHIATRIC NEGATIVE: 1
NEUROLOGICAL NEGATIVE: 1
POLYDIPSIA: 0
BRUISES/BLEEDS EASILY: 0
GASTROINTESTINAL NEGATIVE: 1
DIFFICULTY URINATING: 1
WEAKNESS: 0
ABDOMINAL PAIN: 0
FEVER: 0

## 2023-09-05 ENCOUNTER — OFFICE VISIT (OUTPATIENT)
Dept: PODIATRY | Facility: CLINIC | Age: 68
End: 2023-09-05
Attending: FAMILY MEDICINE
Payer: COMMERCIAL

## 2023-09-05 VITALS — HEART RATE: 68 BPM | OXYGEN SATURATION: 97 % | DIASTOLIC BLOOD PRESSURE: 69 MMHG | SYSTOLIC BLOOD PRESSURE: 145 MMHG

## 2023-09-05 DIAGNOSIS — Q66.70 CAVUS DEFORMITY OF FOOT: Primary | ICD-10-CM

## 2023-09-05 PROCEDURE — 99203 OFFICE O/P NEW LOW 30 MIN: CPT | Performed by: PODIATRIST

## 2023-09-05 ASSESSMENT — PAIN SCALES - GENERAL: PAINLEVEL: MILD PAIN (2)

## 2023-09-05 NOTE — PATIENT INSTRUCTIONS
What are Prescription Custom Orthotics?  Custom orthotics are specially-made devices designed to support and comfort your feet. Prescription orthotics are crafted for you and no one else. They match the contours of your feet precisely and are designed for the way you move. Orthotics are only manufactured after a podiatrist has conducted a complete evaluation of your feet, ankles, and legs, so the orthotic can accommodate your unique foot structure and pathology.  Prescription orthotics are divided into two categories:  Functional orthotics are designed to control abnormal motion. They may be used to treat foot pain caused by abnormal motion; they can also be used to treat injuries such as shin splints or tendinitis. Functional orthotics are usually crafted of a semi-rigid material such as plastic or graphite.  Accommodative orthotics are softer and meant to provide additional cushioning and support. They can be used to treat diabetic foot ulcers, painful calluses on the bottom of the foot, and other uncomfortable conditions.  Podiatrists use orthotics to treat foot problems such as plantar fasciitis, bursitis, tendinitis, diabetic foot ulcers, and foot, ankle, and heel pain. Clinical research studies have shown that podiatrist-prescribed foot orthotics decrease foot pain and improve function.  Orthotics typically cost more than shoe inserts purchased in a retail store, but the additional cost is usually well worth it. Unlike shoe inserts, orthotics are molded to fit each individual foot, so you can be sure that your orthotics fit and do what they're supposed to do. Prescription orthotics are also made of top-notch materials and last many years when cared for properly. Insurance often helps pay for prescription orthotics.  What are Shoe Inserts?   You've seen them at the grocery store and at the mall. You've probably even seen them on TV and online. Shoe inserts are any kind of non-prescription foot support designed  to be worn inside a shoe. Pre-packaged, mass produced, arch supports are shoe inserts. So are the  custom-made  insoles and foot supports that you can order online or at retail stores. Unless the device has been prescribed by a doctor and crafted for your specific foot, it's a shoe insert, not a custom orthotic device--despite what the ads might say.  Shoe inserts can be very helpful for a variety of foot ailments, including flat arches and foot and leg pain. They can cushion your feet, provide comfort, and support your arches, but they can't correct biomechanical foot problems or cure long-standing foot issues.  The most common types of shoe inserts are:  Arch supports: Some people have high arches. Others have low arches or flat feet. Arch supports generally have a  bumped-up  appearance and are designed to support the foot's natural arch.   Insoles: Insoles slip into your shoe to provide extra cushioning and support. Insoles are often made of gel, foam, or plastic.   Heel liners: Heel liners, sometimes called heel pads or heel cups, provide extra cushioning in the heel region. They may be especially useful for patients who have foot pain caused by age-related thinning of the heels' natural fat pads.   Foot cushions: Do your shoes rub against your heel or your toes? Foot cushions come in many different shapes and sizes and can be used as a barrier between you and your shoe.  Choosing an Over-the-Counter Shoe Insert  Selecting a shoe insert from the wide variety of devices on the market can be overwhelming. Here are some podiatrist-tested tips to help you find the insert that best meets your needs:  Consider your health. Do you have diabetes? Problems with circulation? An over-the-counter insert may not be your best bet. Diabetes and poor circulation increase your risk of foot ulcers and infections, so schedule an appointment with a podiatrist. He or she can help you select a solution that won't cause additional  health problems.   Think about the purpose. Are you planning to run a marathon, or do you just need a little arch support in your work shoes? Look for a product that fits your planned level of activity.   Bring your shoes. For the insert to be effective, it has to fit into your shoes. So bring your sneakers, dress shoes, or work boots--whatever you plan to wear with your insert. Look for an insert that will fit the contours of your shoe.   Try them on. If all possible, slip the insert into your shoe and try it out. Walk around a little. How does it feel? Don't assume that feelings of pressure will go away with continued wear. (If you can't try the inserts at the store, ask about the store's return policy and hold on to your receipt.)    Please call one of the [place] locations below to schedule an appointment. If you received a prescription please bring it with you to your appointment. Some locations are limited to what they carry.    Office Locations    [facility]  [address] [place] [address]  Home Medical Equipment, [address]   Phone: [phone]   Orthotics and Prosthetics, [address]   Phone: [phone]    [facility]   Home Medical Equipment   [address], [place] [address]  Phone :[phone]  Orthotics and Prosthetics   [address], [address], [facility] [address]  Phone:[phone]          [facility] [place]  [address]   [place] [address]   Phone: [phone]    [facility]  [address]  [place] [address]  Phone: [phone]    [facility]   [address]  [place] [address]  Phone: [phone]    [facility]  [address]  [place] [address]  Phone:  [phone]    [facility]  [address]  [place] [address]  Phone: [phone]    [place]   [address] [place] [address]   Phone: [phone]    WEARING YOUR CUSTOM FOOT ORTHOTICS   Most insurance plans cover one pair of orthotics per year. You must check with your   insurance plan to see what your payment responsibility will be. Please call your   insurance company by calling the number on the back of your insurance card.   Orthotic's are non-refundable and non-returnable.   Orthotics are made of various designs. Some orthotics are covered with material that extends beyond your toes. If your orthotic is of this design, you will likely need to trim the toe end to get a proper fit. The insole from your shoe can be used as a template. Simply overlay the shoe insert on top of the custom orthotic. Align the heel end while tracing the length of the insert onto the custom orthotic. Use a large scissor to trim the toe end until you get a proper fit in the shoe.   The orthotic needs to be pushed as far back in the shoe as possible. The heel portion should not ride forward so as not to irritate your heel.   Orthotics are designed to work with socks. Excessive perspiration will shorten the life span of the orthotics. Remove the orthotic from the shoe frequently for proper drying.   The break-in period lasts for weeks. People new to orthotics will likely experience new aches and pains. The orthotic is forcing your foot into a new position. Arch, foot and leg muscle aches and fatigue are common during these weeks. Minor discomfort can be considered normal break in phenomenon. Start wearing your orthotic around your home your first day. Limited activity for one to two hours is recommended. You can increase one or two additional hours each day provided the aches and pains are subsiding. The degree of discomfort, fatigue and problems will dictate the speed of break  in. You may require multiple weeks to work up to full time use.   Do not continue wearing your orthotics if they are creating problems such as blisters or sores. Do not hesitate to call the clinic to speak with a nurse regarding orthotic   break in, fit, trimming, etc. You may also need to see the doctor if the orthotics are   simply not working out. Adjustments are sometimes made to improve orthotic   function.     Orthotics will only work in certain styles and types of shoes. Orthotics rarely work in dress shoes. Slip-ons, clogs, sandals and heels are particularly troublesome. Specially designed orthotics may be necessary for these types of shoes. Your custom orthotic was designed for activities that require appropriate walking or running shoes. Lace up athletic shoes, walking shoes or work boots should work appropriately. You may need a wider or longer shoe. Shoes with a removable  or insert work best. In general, you want to remove an insert from the shoe before placing the orthotic into the shoe. Shoes without a removable liner may not work as well.     When purchasing new shoes, bring your orthotics along to get a proper fit. Shop at stores that are familiar with orthotics.   Frequent washing of the orthotic may shorten the life span of the top cover. The top cover can be replaced but will generally last one to five years depending on use and foot perspiration.

## 2023-09-05 NOTE — Clinical Note
"    9/5/2023         RE: Elza Polk  [address]  [place] [address]        Dear Colleague,    Thank you for referring your patient, Elza Polk, to the [facility]. Please see a copy of my visit note below.    FOOT AND ANKLE SURGERY/PODIATRY CONSULT NOTE         ASSESSMENT:   ***      TREATMENT:  ***        HPI: I was asked to see Elza Polk today  ***.  The patient was seen in consultation at the request of Deyanira Beatty MD for ***.     {PAST MEDICAL HISTORY:[id number]::\"***\"}    {SOCIAL HISTORY:606798723::\"***\"}     No Known Allergies       Current Outpatient Medications:     anastrozole (ARIMIDEX) 1 mg tablet, [ANASTROZOLE (ARIMIDEX) 1 MG TABLET] Take 1 mg by mouth daily., Disp: , Rfl:     CALCIUM CARBONATE/VITAMIN D3 (CALCIUM+D ORAL), [CALCIUM CARBONATE/VITAMIN D3 (CALCIUM+D ORAL)] Take 1 tablet by mouth daily., Disp: , Rfl:     hydroxychloroquine (PLAQUENIL) 200 MG tablet, Take 1 tablet (200 mg) by mouth 2 times daily for 90 days, Disp: 180 tablet, Rfl: 1    multivitamin (ONE A DAY) per tablet, [MULTIVITAMIN (ONE A DAY) PER TABLET] Take 1 tablet by mouth daily., Disp: , Rfl:     Current Facility-Administered Medications:     2 mL bupivacaine (MARCAINE) preservative free injection 0.25% (10 mL vial), 2 mL, , , Eamon Diaz MD, 2 mL at 03/17/23 1433    lidocaine (PF) (XYLOCAINE) 1 % injection 2 mL, 2 mL, , , Eamon Diaz MD, 2 mL at 03/17/23 1433    triamcinolone (KENALOG-40) injection 40 mg, 40 mg, , , Eamon Diaz MD, 40 mg at 03/17/23 1433     Family History   Problem Relation Age of Onset    Prostate Cancer Father     Coronary Artery Disease Father     Hypertension Father     Hypertension Mother     Diabetes Mother     Cerebrovascular Disease Mother     Breast Cancer Cousin     Other Cancer Cousin     Anxiety Disorder Son     Anxiety Disorder Son     Mental Illness Sister         Social History     Socioeconomic History    Marital status: " "     Spouse name: Not on file    Number of children: Not on file    Years of education: Not on file    Highest education level: Not on file   Occupational History    Not on file   Tobacco Use    Smoking status: Former    Smokeless tobacco: Never   Substance and Sexual Activity    Alcohol use: Yes     Comment: Alcoholic Drinks/day: occasional     Drug use: Not Currently    Sexual activity: Not on file   Other Topics Concern    Parent/sibling w/ CABG, MI or angioplasty before 65F 55M? No   Social History Narrative    Not on file     Social Determinants of Health     Financial Resource Strain: Not on file   Food Insecurity: Not on file   Transportation Needs: Not on file   Physical Activity: Not on file   Stress: Not on file   Social Connections: Not on file   Intimate Partner Violence: Not on file   Housing Stability: Not on file        Review of Systems - Patient denies fever, chills, rash, wound, stiffness, limping, numbness, weakness, heart burn, blood in stool, chest pain with activity, calf pain when walking, shortness of breath with activity, chronic cough, easy bleeding/bruising, swelling of ankles, excessive thirst, fatigue, depression, anxiety.  Patient admits to ***.      OBJECTIVE:  Appearance: {appearance:186196::\"alert, well appearing, and in no distress\"}.    LMP  (LMP Unknown)      There is no height or weight on file to calculate BMI.     General appearance: Patient is alert and fully cooperative with history & exam.  No sign of distress is noted during the visit.  Psychiatric: Affect is pleasant & appropriate.  Patient appears motivated to improve health.  Respiratory: Breathing is regular & unlabored while sitting.  HEENT: Hearing is intact to spoken word.  Speech is clear.  No gross evidence of visual impairment that would impact ambulation.    Vascular: Dorsalis pedis and posterior tibial pulses are palpable. There is pedal hair growth ***.  CFT < 3 sec from anterior tibial surface to " distal digits ***. There is no appreciable edema noted.  Dermatologic: Turgor and texture are within normal limits. No coloration or temperature changes. No primary or secondary lesions noted.  Neurologic: All epicritic and proprioceptive sensations are grossly intact ***.  Musculoskeletal: All active and passive ankle, subtalar, midtarsal, and 1st MPJ range of motion are grossly intact without pain or crepitus, with the exception of ***. Manual muscle strength is ***. All dorsiflexors, plantarflexors, invertors, evertors are intact ***. Tenderness present to *** on palpation. Tenderness to *** with range of motion. Calf is soft/non-tender with/without warmth/induration    Imaging:     {Imaging order/result:55401}  No images are attached to the encounter or orders placed in the encounter.     No results found.   No results found.       TREATMENT:  ***    Moe Villalobos DPM  [facility] Foot & Ankle Surgery/Podiatry         Again, thank you for allowing me to participate in the care of your patient.        Sincerely,        Moe Malone DPM

## 2023-09-05 NOTE — PROGRESS NOTES
FOOT AND ANKLE SURGERY/PODIATRY CONSULT NOTE         ASSESSMENT:   Cavus foot deformity      TREATMENT:  I have recommended prescription orthotics.  After wearing the orthotics consistently for 3 to 4 months if her pain persist I have asked the patient to return to the clinic for follow-up visit.        HPI: I was asked to see Elza BENTLEY Wilfredoregine today to evaluate and treat bilateral foot pain.  The patient indicated that she has had pain in both heels and the ball of both feet and the pain can be aggravated with increased activity.  She is extremely active.  She exercises regularly.  She stated that she noticed her pain began to increase approximately 1 month ago.  She denies any particular direct trauma to her feet.  She has not had any associated redness or swelling.  The pain is relieved with nonweightbearing.  She denies any other previous treatment.  The patient was seen in consultation at the request of Deyanira Beatty MD for evaluation and treatment of bilateral foot pain.     Past Medical History:   Diagnosis Date    H/O small bowel obstruction     Rheumatoid arthritis (H)        Social History     Socioeconomic History    Marital status:      Spouse name: Not on file    Number of children: Not on file    Years of education: Not on file    Highest education level: Not on file   Occupational History    Not on file   Tobacco Use    Smoking status: Former    Smokeless tobacco: Never   Substance and Sexual Activity    Alcohol use: Yes     Comment: Alcoholic Drinks/day: occasional     Drug use: Not Currently    Sexual activity: Not on file   Other Topics Concern    Parent/sibling w/ CABG, MI or angioplasty before 65F 55M? No   Social History Narrative    Not on file     Social Determinants of Health     Financial Resource Strain: Not on file   Food Insecurity: Not on file   Transportation Needs: Not on file   Physical Activity: Not on file   Stress: Not on file   Social Connections: Not on file    Intimate Partner Violence: Not on file   Housing Stability: Not on file        No Known Allergies       Current Outpatient Medications:     anastrozole (ARIMIDEX) 1 mg tablet, [ANASTROZOLE (ARIMIDEX) 1 MG TABLET] Take 1 mg by mouth daily., Disp: , Rfl:     CALCIUM CARBONATE/VITAMIN D3 (CALCIUM+D ORAL), [CALCIUM CARBONATE/VITAMIN D3 (CALCIUM+D ORAL)] Take 1 tablet by mouth daily., Disp: , Rfl:     hydroxychloroquine (PLAQUENIL) 200 MG tablet, Take 1 tablet (200 mg) by mouth 2 times daily for 90 days, Disp: 180 tablet, Rfl: 1    multivitamin (ONE A DAY) per tablet, [MULTIVITAMIN (ONE A DAY) PER TABLET] Take 1 tablet by mouth daily., Disp: , Rfl:     Current Facility-Administered Medications:     2 mL bupivacaine (MARCAINE) preservative free injection 0.25% (10 mL vial), 2 mL, , , Eamon Diaz MD, 2 mL at 03/17/23 1433    lidocaine (PF) (XYLOCAINE) 1 % injection 2 mL, 2 mL, , , Eamon Diaz MD, 2 mL at 03/17/23 1433    triamcinolone (KENALOG-40) injection 40 mg, 40 mg, , , Eamon Diaz MD, 40 mg at 03/17/23 1433     Family History   Problem Relation Age of Onset    Prostate Cancer Father     Coronary Artery Disease Father     Hypertension Father     Hypertension Mother     Diabetes Mother     Cerebrovascular Disease Mother     Breast Cancer Cousin     Other Cancer Cousin     Anxiety Disorder Son     Anxiety Disorder Son     Mental Illness Sister         Social History     Socioeconomic History    Marital status:      Spouse name: Not on file    Number of children: Not on file    Years of education: Not on file    Highest education level: Not on file   Occupational History    Not on file   Tobacco Use    Smoking status: Former    Smokeless tobacco: Never   Substance and Sexual Activity    Alcohol use: Yes     Comment: Alcoholic Drinks/day: occasional     Drug use: Not Currently    Sexual activity: Not on file   Other Topics Concern    Parent/sibling w/ CABG, MI or angioplasty before 65F 55M? No   Social  History Narrative    Not on file     Social Determinants of Health     Financial Resource Strain: Not on file   Food Insecurity: Not on file   Transportation Needs: Not on file   Physical Activity: Not on file   Stress: Not on file   Social Connections: Not on file   Intimate Partner Violence: Not on file   Housing Stability: Not on file        Review of Systems - Patient denies fever, chills, rash, wound, stiffness, limping, numbness, weakness, heart burn, blood in stool, chest pain with activity, calf pain when walking, shortness of breath with activity, chronic cough, easy bleeding/bruising, swelling of ankles, excessive thirst, fatigue, depression, anxiety.  Patient admits to bilateral foot pain.      OBJECTIVE:  Appearance: alert, well appearing, and in no distress.    LMP  (LMP Unknown)      There is no height or weight on file to calculate BMI.     General appearance: Patient is alert and fully cooperative with history & exam.  No sign of distress is noted during the visit.  Psychiatric: Affect is pleasant & appropriate.  Patient appears motivated to improve health.  Respiratory: Breathing is regular & unlabored while sitting.  HEENT: Hearing is intact to spoken word.  Speech is clear.  No gross evidence of visual impairment that would impact ambulation.    Vascular: Dorsalis pedis and posterior tibial pulses are palpable. There is no pedal hair growth bilaterally.  CFT < 3 sec from anterior tibial surface to distal digits bilaterally. There is no appreciable edema noted.  Dermatologic: Turgor and texture are within normal limits. No coloration or temperature changes. No primary or secondary lesions noted.  Neurologic: All epicritic and proprioceptive sensations are grossly intact bilaterally.  Musculoskeletal: All active and passive ankle, subtalar, midtarsal, and 1st MPJ range of motion are grossly intact without pain or crepitus, with the exception of none. Manual muscle strength is within normal limits  bilaterally. All dorsiflexors, plantarflexors, invertors, evertors are intact bilaterally.  No tenderness present to bilateral feet or ankles on palpation.  No tenderness to bilateral feet or ankles with range of motion.  Increased height of the medial longitudinal arch noted bilaterally.  Calf is soft/non-tender without warmth/induration    Imaging:       No images are attached to the encounter or orders placed in the encounter.     No results found.   No results found.       Moe Villalobos DPM  [facility] Foot & Ankle Surgery/Podiatry

## 2023-10-04 NOTE — TELEPHONE ENCOUNTER
DIAGNOSIS: RT Hand - Carpal Tunnel Syndrome   APPOINTMENT DATE: 10/10/2023   NOTES STATUS DETAILS   OFFICE NOTE from referring provider Internal 06/23/2023 - tabitha Diaz MD - [facility] Ortho   MEDICATION LIST Internal    XRAYS (IMAGES & REPORTS) Internal 10/28/2022 - RT Wrist

## 2023-10-07 ENCOUNTER — HEALTH MAINTENANCE LETTER (OUTPATIENT)
Age: 68
End: 2023-10-07

## 2023-10-09 ASSESSMENT — ENCOUNTER SYMPTOMS: EYE WATERING: 1

## 2023-10-09 NOTE — TELEPHONE ENCOUNTER
Due to be seen    Rx renewed per Medication Renewal Policy. Medication was last renewed on 6/29/18.    Lacie Moffett, [facility] Triage/Med Refill 8/14/2019     Requested Prescriptions   Pending Prescriptions Disp Refills     PREMARIN 0.625 mg tablet [Pharmacy Med Name: PREMARIN 0.625MG TABS] 90 tablet 0     Sig: TAKE 1 TABLET(0.625 MG TOTAL) BY MOUTH DAILY       Hormone Replacement Therapy Refill Protocol Passed - 8/13/2019  1:19 PM        Passed - PCP or prescribing provider visit in past 12 months       Last office visit with prescriber/PCP: 9/17/2018 Deyanira Meraz MD OR same dept: 9/17/2018 Deyanira Meraz MD OR same specialty: 9/17/2018 Deyanira Meraz MD  Last physical: 6/29/2018 Last MTM visit: Visit date not found     Next visit within 3 mo: Visit date not found  Next physical within 3 mo: Visit date not found  Prescriber OR PCP: Deyanira Meraz MD  Last diagnosis associated with med order: 1. Menopause  - PREMARIN 0.625 mg tablet [Pharmacy Med Name: PREMARIN 0.625MG TABS]; TAKE 1 TABLET(0.625 MG TOTAL) BY MOUTH DAILY  Dispense: 90 tablet; Refill: 0     If protocol passes may refill for 3 months.                        
[place]

## 2023-10-10 ENCOUNTER — PRE VISIT (OUTPATIENT)
Dept: ORTHOPEDICS | Facility: CLINIC | Age: 68
End: 2023-10-10

## 2023-10-10 ENCOUNTER — OFFICE VISIT (OUTPATIENT)
Dept: ORTHOPEDICS | Facility: CLINIC | Age: 68
End: 2023-10-10
Payer: COMMERCIAL

## 2023-10-10 ENCOUNTER — TELEPHONE (OUTPATIENT)
Dept: ORTHOPEDICS | Facility: CLINIC | Age: 68
End: 2023-10-10

## 2023-10-10 DIAGNOSIS — G56.01 CARPAL TUNNEL SYNDROME OF RIGHT WRIST: Primary | ICD-10-CM

## 2023-10-10 PROCEDURE — 99204 OFFICE O/P NEW MOD 45 MIN: CPT | Performed by: STUDENT IN AN ORGANIZED HEALTH CARE EDUCATION/TRAINING PROGRAM

## 2023-10-10 NOTE — TELEPHONE ENCOUNTER
Procedure: Right open carpal tunnel release  Facility: [facility]  Length: 30 minutes  Anesthesia: Local, MAC  Post-op appointments needed: 2 weeks with surgeon or PA, 6 weeks with surgeon only.  Surgery packet/instructions given to patient?  Yes     Sami Hammond RN

## 2023-10-10 NOTE — NURSING NOTE
Reason For Visit:   Chief Complaint   Patient presents with    Surgical Followup     Consult for carpal tunnel Right wrist       Primary MD: Deyanira Meraz  Ref. MD: Emily    Age: 68 year old    ?  No      LMP  (LMP Unknown)       Pain Assessment  Patient Currently in Pain: Yes  0-10 Pain Scale: 6  Primary Pain Location: Wrist (Right)  Pain Descriptors: Intermittent, Tingling    Hand Dominance Evaluation  Hand Dominance: Right          QuickDASH Assessment      10/9/2023    12:07 PM   QuickDASH Main   1. Open a tight or new jar No difficulty   2. Do heavy household chores (e.g., wash walls, floors) No difficulty   3. Carry a shopping bag or briefcase No difficulty   4. Wash your back No difficulty   5. Use a knife to cut food No difficulty   6. Recreational activities in which you take some force or impact through your arm, shoulder or hand (e.g., golf, hammering, tennis, etc.) No difficulty   7. During the past week, to what extent has your arm, shoulder or hand problem interfered with your normal social activities with family, friends, neighbours or groups Not at all   8. During the past week, were you limited in your work or other regular daily activities as a result of your arm, shoulder or hand problem Not limited at all   9. Arm, shoulder or hand pain None   10.Tingling (pins and needles) in your arm,shoulder or hand Moderate   11. During the past week, how much difficulty have you had sleeping because of the pain in your arm, shoulder or hand Mild difficulty   Quickdash Ability Score 6.82          Current Outpatient Medications   Medication Sig Dispense Refill    anastrozole (ARIMIDEX) 1 mg tablet [ANASTROZOLE (ARIMIDEX) 1 MG TABLET] Take 1 mg by mouth daily.      CALCIUM CARBONATE/VITAMIN D3 (CALCIUM+D ORAL) [CALCIUM CARBONATE/VITAMIN D3 (CALCIUM+D ORAL)] Take 1 tablet by mouth daily.      hydroxychloroquine (PLAQUENIL) 200 MG tablet Take 1 tablet (200 mg) by mouth 2 times daily for  90 days 180 tablet 1    multivitamin (ONE A DAY) per tablet [MULTIVITAMIN (ONE A DAY) PER TABLET] Take 1 tablet by mouth daily.         No Known Allergies    Neeta Hilliard ATC

## 2023-10-10 NOTE — LETTER
10/10/2023         RE: Elza Polk  [address]  [place] [address]        Dear Colleague,    Thank you for referring your patient, Elza Polk, to the [facility] [place]. Please see a copy of my visit note below.    Ortho Hand    HPI: 68F RHD NS p/w R hand paresthesias. She started having tingling and numbness for the last 8-10 months. It has slowly worsened. She has nighttime awakenings. Splinting does not help. She has done some lumbrical stretching with temporary relief following symptoms. The small finger is unaffected.     ROS: Negative, see HPI  PMH: Nondiabetic  PSH: L wrist fracture ORIF 8 years ago  Medications: No blood thinners  Allergies: None  SH: Nonsmoker  FH: No bleeding or clotting issues, or problems with anesthesia    Examination:  Nonlabored breathing  Not distressed  Can make a full R composite fist  R thumb opposition intact  R intrinsic hand muscle function intact  R median and ulnar dist sensation intact   Mildly positive R Durkan's test  Negative R elbow Tinel's and flexion tests    NCS: R median motor latency 5.6 msec, R sensory latency NR    A/P: 68F RHD NS p/w R CTS    -Discussed the options for management of carpal tunnel syndrome, including observation, wrist cock-up splinting, lumbrical stretch exercises and surgery. Since the right sided symptoms are refractory to conservative management, it is reasonable to pursue carpal tunnel release surgery. Patient would like this.   -Discussed the risks of surgery, including but not limited to: infection, bleeding, pain, poor scarring, injury to the nerves, neuroma formation, injurt to tendons, incomplete release, recurrence of carpal tunnel syndrome, need for revision surgery, complex regional pain syndrome. Despite these risks, patient consents to and would like to proceed with right open carpal tunnel release.   -MAC with local   -Case request placed  -A total of 45 minutes was devoted  to review of chart, direct face-to-face patient counseling and documentation during this encounter, exclusive of any procedure performed.    Chiki Ernandez MD, PhD

## 2023-10-11 ASSESSMENT — ENCOUNTER SYMPTOMS
DIARRHEA: 0
SORE THROAT: 0
COUGH: 1
DIZZINESS: 0
SHORTNESS OF BREATH: 0
CONSTIPATION: 0
EYE PAIN: 0
PARESTHESIAS: 0
NERVOUS/ANXIOUS: 0
HEMATOCHEZIA: 0
WEAKNESS: 0
JOINT SWELLING: 0
CHILLS: 0
NAUSEA: 0
DYSURIA: 0
HEADACHES: 0
HEARTBURN: 0
ARTHRALGIAS: 0
BREAST MASS: 0
MYALGIAS: 0
ABDOMINAL PAIN: 0
PALPITATIONS: 0
HEMATURIA: 0
FEVER: 0
FREQUENCY: 0

## 2023-10-11 ASSESSMENT — ACTIVITIES OF DAILY LIVING (ADL): CURRENT_FUNCTION: NO ASSISTANCE NEEDED

## 2023-10-11 NOTE — PROGRESS NOTES
Ortho Hand    HPI: 68F RHD NS p/w R hand paresthesias. She started having tingling and numbness for the last 8-10 months. It has slowly worsened. She has nighttime awakenings. Splinting does not help. She has done some lumbrical stretching with temporary relief following symptoms. The small finger is unaffected.     ROS: Negative, see HPI  PMH: Nondiabetic  PSH: L wrist fracture ORIF 8 years ago  Medications: No blood thinners  Allergies: None  SH: Nonsmoker  FH: No bleeding or clotting issues, or problems with anesthesia    Examination:  Nonlabored breathing  Not distressed  Can make a full R composite fist  R thumb opposition intact  R intrinsic hand muscle function intact  R median and ulnar dist sensation intact   Mildly positive R Durkan's test  Negative R elbow Tinel's and flexion tests    NCS: R median motor latency 5.6 msec, R sensory latency NR    A/P: 68F RHD NS p/w R CTS    -Discussed the options for management of carpal tunnel syndrome, including observation, wrist cock-up splinting, lumbrical stretch exercises and surgery. Since the right sided symptoms are refractory to conservative management, it is reasonable to pursue carpal tunnel release surgery. Patient would like this.   -Discussed the risks of surgery, including but not limited to: infection, bleeding, pain, poor scarring, injury to the nerves, neuroma formation, injurt to tendons, incomplete release, recurrence of carpal tunnel syndrome, need for revision surgery, complex regional pain syndrome. Despite these risks, patient consents to and would like to proceed with right open carpal tunnel release.   -MAC with local   -Case request placed  -A total of 45 minutes was devoted to review of chart, direct face-to-face patient counseling and documentation during this encounter, exclusive of any procedure performed.    Chiik Ernandez MD, PhD

## 2023-10-11 NOTE — COMMUNITY RESOURCES LIST (ENGLISH)
10/11/2023   [facility]  N/A  For questions about this resource list or additional care needs, please contact your primary care clinic or care manager.  Phone: [phone]   Email: N/A   Address: [address] [place] [id number]   Hours: N/A        Financial Stability       Utility payment assistance  1  [facility] Distance: 0.92 miles      Phone/Virtual   [address] [place] [address]  Language: English, Hmong, Tamazight, Albanian  Hours: Mon - Fri 10:00 AM - 3:00 PM  Fees: Free   Phone: (559) 523-5684 Email: [email or web address] Website: [email or web address]     2  [employer] [facility] Distance: 1.47 miles      Phone/Virtual   [address] [place] [address]  Language: English, Hmong, Bangladeshi, Albanian  Hours: Mon - Fri 8:00 AM - 4:30 PM  Fees: Free   Phone: (354) 556-8007 Email: info@caprw.org Website: http://www.caprw.org/          Important Numbers & Websites       Emergency Services   911  City Services   311  Poison Control   (172) 311-8857  Suicide Prevention Lifeline   (212) 854-6996 (TALK)  Child Abuse Hotline   (113) 106-3685 (4-A-Child)  Sexual Assault Hotline   (976) 389-7010 (HOPE)  National Runaway Safeline   (616) 624-8004 (RUNAWAY)  All-Options Talkline   (994) 954-4996  Substance Abuse Referral   (183) 610-7839 (HELP)

## 2023-10-18 ENCOUNTER — OFFICE VISIT (OUTPATIENT)
Dept: FAMILY MEDICINE | Facility: CLINIC | Age: 68
End: 2023-10-18
Payer: COMMERCIAL

## 2023-10-18 VITALS
WEIGHT: 195 LBS | BODY MASS INDEX: 30.61 KG/M2 | TEMPERATURE: 97.5 F | HEART RATE: 73 BPM | RESPIRATION RATE: 18 BRPM | SYSTOLIC BLOOD PRESSURE: 121 MMHG | DIASTOLIC BLOOD PRESSURE: 77 MMHG | HEIGHT: 67 IN | OXYGEN SATURATION: 97 %

## 2023-10-18 DIAGNOSIS — H61.21 IMPACTED CERUMEN OF RIGHT EAR: ICD-10-CM

## 2023-10-18 DIAGNOSIS — Z90.710 S/P TOTAL HYSTERECTOMY: ICD-10-CM

## 2023-10-18 DIAGNOSIS — Z00.00 ENCOUNTER FOR MEDICARE ANNUAL WELLNESS EXAM: Primary | ICD-10-CM

## 2023-10-18 DIAGNOSIS — M05.79 SEROPOSITIVE RHEUMATOID ARTHRITIS OF MULTIPLE SITES (H): ICD-10-CM

## 2023-10-18 DIAGNOSIS — C50.112 MALIGNANT NEOPLASM OF CENTRAL PORTION OF LEFT BREAST IN FEMALE, ESTROGEN RECEPTOR POSITIVE (H): ICD-10-CM

## 2023-10-18 DIAGNOSIS — E55.9 VITAMIN D DEFICIENCY: ICD-10-CM

## 2023-10-18 DIAGNOSIS — Z13.9 ENCOUNTER FOR SCREENING INVOLVING SOCIAL DETERMINANTS OF HEALTH (SDOH): ICD-10-CM

## 2023-10-18 DIAGNOSIS — Z00.00 ADULT GENERAL MEDICAL EXAM: ICD-10-CM

## 2023-10-18 DIAGNOSIS — Z17.0 MALIGNANT NEOPLASM OF CENTRAL PORTION OF LEFT BREAST IN FEMALE, ESTROGEN RECEPTOR POSITIVE (H): ICD-10-CM

## 2023-10-18 DIAGNOSIS — Z23 NEED FOR IMMUNIZATION AGAINST INFLUENZA: ICD-10-CM

## 2023-10-18 LAB
ALBUMIN SERPL BCG-MCNC: 4.7 G/DL (ref 3.5–5.2)
ALP SERPL-CCNC: 60 U/L (ref 35–104)
ALT SERPL W P-5'-P-CCNC: 37 U/L (ref 0–50)
ANION GAP SERPL CALCULATED.3IONS-SCNC: 11 MMOL/L (ref 7–15)
AST SERPL W P-5'-P-CCNC: 34 U/L (ref 0–45)
BILIRUB SERPL-MCNC: 0.3 MG/DL
BUN SERPL-MCNC: 16.1 MG/DL (ref 8–23)
CALCIUM SERPL-MCNC: 9.6 MG/DL (ref 8.8–10.2)
CHLORIDE SERPL-SCNC: 103 MMOL/L (ref 98–107)
CREAT SERPL-MCNC: 0.76 MG/DL (ref 0.51–0.95)
DEPRECATED HCO3 PLAS-SCNC: 25 MMOL/L (ref 22–29)
EGFRCR SERPLBLD CKD-EPI 2021: 86 ML/MIN/1.73M2
ERYTHROCYTE [DISTWIDTH] IN BLOOD BY AUTOMATED COUNT: 11.8 % (ref 10–15)
GLUCOSE SERPL-MCNC: 92 MG/DL (ref 70–99)
HCT VFR BLD AUTO: 38.4 % (ref 35–47)
HGB BLD-MCNC: 12.4 G/DL (ref 11.7–15.7)
MCH RBC QN AUTO: 29.5 PG (ref 26.5–33)
MCHC RBC AUTO-ENTMCNC: 32.3 G/DL (ref 31.5–36.5)
MCV RBC AUTO: 91 FL (ref 78–100)
PLATELET # BLD AUTO: 284 10E3/UL (ref 150–450)
POTASSIUM SERPL-SCNC: 4.2 MMOL/L (ref 3.4–5.3)
PROT SERPL-MCNC: 7 G/DL (ref 6.4–8.3)
RBC # BLD AUTO: 4.21 10E6/UL (ref 3.8–5.2)
SODIUM SERPL-SCNC: 139 MMOL/L (ref 135–145)
VIT D+METAB SERPL-MCNC: 46 NG/ML (ref 20–50)
WBC # BLD AUTO: 5.9 10E3/UL (ref 4–11)

## 2023-10-18 PROCEDURE — G0008 ADMIN INFLUENZA VIRUS VAC: HCPCS | Performed by: FAMILY MEDICINE

## 2023-10-18 PROCEDURE — 90662 IIV NO PRSV INCREASED AG IM: CPT | Performed by: FAMILY MEDICINE

## 2023-10-18 PROCEDURE — G0438 PPPS, INITIAL VISIT: HCPCS | Performed by: FAMILY MEDICINE

## 2023-10-18 PROCEDURE — 85027 COMPLETE CBC AUTOMATED: CPT | Performed by: FAMILY MEDICINE

## 2023-10-18 PROCEDURE — 82306 VITAMIN D 25 HYDROXY: CPT | Performed by: FAMILY MEDICINE

## 2023-10-18 PROCEDURE — 80053 COMPREHEN METABOLIC PANEL: CPT | Performed by: FAMILY MEDICINE

## 2023-10-18 PROCEDURE — 69210 REMOVE IMPACTED EAR WAX UNI: CPT | Mod: RT | Performed by: FAMILY MEDICINE

## 2023-10-18 PROCEDURE — 36415 COLL VENOUS BLD VENIPUNCTURE: CPT | Performed by: FAMILY MEDICINE

## 2023-10-18 ASSESSMENT — ENCOUNTER SYMPTOMS
SHORTNESS OF BREATH: 0
HEMATOCHEZIA: 0
COUGH: 1
NAUSEA: 0
MYALGIAS: 0
HEARTBURN: 0
HEMATURIA: 0
JOINT SWELLING: 0
WEAKNESS: 0
HEADACHES: 0
EYE PAIN: 0
FREQUENCY: 0
DYSURIA: 0
PALPITATIONS: 0
SORE THROAT: 0
CHILLS: 0
CONSTIPATION: 0
ARTHRALGIAS: 0
FEVER: 0
BREAST MASS: 0
ABDOMINAL PAIN: 0
NERVOUS/ANXIOUS: 0
DIARRHEA: 0
PARESTHESIAS: 0
DIZZINESS: 0

## 2023-10-18 ASSESSMENT — ACTIVITIES OF DAILY LIVING (ADL): CURRENT_FUNCTION: NO ASSISTANCE NEEDED

## 2023-10-18 NOTE — PROGRESS NOTES
"SUBJECTIVE:   Elza is a 68 year old who presents for Preventive Visit.      10/18/2023     7:04 AM   Additional Questions   Roomed by Aliza       Are you in the first 12 months of your Medicare coverage?  No    Getting exercise -   Walks [place], [facility] [employer] -     Hears well left ear  Right ear - feels like tunnel - feels like liquid - in last couple weeks    Lung cancer screening - quit 15 years ago - off and on smoker  Mom had lung cancer     Healthy Habits:     In general, how would you rate your overall health?  Good    Frequency of exercise:  2-3 days/week    Duration of exercise:  45-60 minutes    Do you usually eat at least 4 servings of fruit and vegetables a day, include whole grains    & fiber and avoid regularly eating high fat or \"junk\" foods?  Yes    Taking medications regularly:  Yes    Medication side effects:  None    Ability to successfully perform activities of daily living:  No assistance needed    Home Safety:  No safety concerns identified    Hearing Impairment:  Need to ask people to speak up or repeat themselves    In the past 6 months, have you been bothered by leaking of urine?  No    In general, how would you rate your overall mental or emotional health?  Good    Additional concerns today:  No      Today's PHQ-2 Score:       10/18/2023     6:59 AM   PHQ-2 ( 1999 Pfizer)   Q1: Little interest or pleasure in doing things 0   Q2: Feeling down, depressed or hopeless 0   PHQ-2 Score 0   Q1: Little interest or pleasure in doing things Not at all   Q2: Feeling down, depressed or hopeless Not at all   PHQ-2 Score 0           Have you ever done Advance Care Planning? (For example, a Health Directive, POLST, or a discussion with a medical provider or your loved ones about your wishes): no written documents        Fall risk  Fallen 2 or more times in the past year?: No  Any fall with injury in the past year?: No  click delete button to remove this line now  Cognitive Screening   1) Repeat 3 items " (Banana, Sunrise, Chair)  2) Clock draw: NORMAL  3) 3 item recall: Recalls 3 objects  Results: 3 items recalled: COGNITIVE IMPAIRMENT LESS LIKELY    Mini-CogTM Copyright ERASMO Bernabe. Licensed by the author for use in [facility]; reprinted with permission ([email or web address]). All rights reserved.      Do you have sleep apnea, excessive snoring or daytime drowsiness? : no    Reviewed and updated as needed this visit by clinical staff   Tobacco  Allergies  Meds              Reviewed and updated as needed this visit by Provider                 Social History     Tobacco Use     Smoking status: Former     Smokeless tobacco: Never   Substance Use Topics     Alcohol use: Yes     Comment: Alcoholic Drinks/day: occasional              10/11/2023    11:06 AM   Alcohol Use   Prescreen: >3 drinks/day or >7 drinks/week? No     Do you have a current opioid prescription? No  Do you use any other controlled substances or medications that are not prescribed by a provider? None        Current providers sharing in care for this patient include:   Patient Care Team:  Deyanira Meraz MD as PCP - General  Deyanira Meraz MD as Assigned PCP  Grace Pryor MBBS as Assigned Rheumatology Provider  Eamon Diaz MD as Assigned Musculoskeletal Provider  Richy Herrmann MD as Assigned Neuroscience Provider  Moe Malone DPM as Assigned Surgical Provider    The following health maintenance items are reviewed in Epic and correct as of today:  Health Maintenance   Topic Date Due     LUNG CANCER SCREENING  Never done     RSV VACCINE 60+ (1 - 1-dose 60+ series) Never done     MEDICARE ANNUAL WELLNESS VISIT  08/23/2023     ANNUAL REVIEW OF HM ORDERS  08/23/2023     INFLUENZA VACCINE (1) 09/01/2023     COVID-19 Vaccine (7 - 2023-24 season) 09/04/2023     FALL RISK ASSESSMENT  10/18/2024     MAMMO SCREENING  10/20/2024     PAP FOLLOW-UP  08/31/2025     HPV FOLLOW-UP  08/31/2025     LIPID   06/23/2026     ADVANCE CARE PLANNING  08/28/2027     COLORECTAL CANCER SCREENING  02/03/2030     DEXA  01/22/2036     HEPATITIS C SCREENING  Completed     PHQ-2 (once per calendar year)  Completed     Pneumococcal Vaccine: 65+ Years  Completed     ZOSTER IMMUNIZATION  Completed     IPV IMMUNIZATION  Aged Out     HPV IMMUNIZATION  Aged Out     MENINGITIS IMMUNIZATION  Aged Out     PAP  Discontinued     DTAP/TDAP/TD IMMUNIZATION  Discontinued     BP Readings from Last 3 Encounters:   10/18/23 121/77   09/05/23 (!) 145/69   08/15/23 116/74    Wt Readings from Last 3 Encounters:   10/18/23 88.5 kg (195 lb)   08/15/23 85.7 kg (189 lb)   08/01/23 89.8 kg (198 lb)                  Patient Active Problem List   Diagnosis     Osteoarthritis Of The Knee     Ulnar neuropathy     High risk medication use     Health care maintenance     Seropositive rheumatoid arthritis of multiple sites (H)     Cyclic citrullinated peptide (CCP) antibody positive     Menopause     S/P total hysterectomy     Trochanteric bursitis, left hip     Lumbar paraspinal muscle spasm     Malignant neoplasm of central portion of left breast in female, estrogen receptor positive (H)     Allergy to penicillin     Past Surgical History:   Procedure Laterality Date     HYSTERECTOMY  2008     OOPHORECTOMY  2008     CA MASTECTOMY, PARTIAL Left 9/23/2020    Procedure: Left Lumpectomy after Wire Loclaization; [place] Lymph Node Biopsy;  Surgeon: Анна Coffey MD;  Location: [facility];  Service: General     US BIOPSY FINE NEEDLE ASPIRATION LYMPH NODE (BREAST) LEFT Left 9/3/2020      BREAST CORE BIOPSY LEFT Left 9/3/2020     US BREAST LOC W SENT NODE INJ LEFT Left 9/23/2020     [facility] FREEING BOWEL ADHESION,ENTEROLYSIS      Description: Laparoscopic Lysis Of Intestinal Adhesions;  Recorded: 08/22/2008;     [facility] TOTAL ABDOM HYSTERECTOMY      Description: Total Abdominal Hysterectomy;  Proc Date: 01/01/2005;  Comments: for uterine fibroids and adhesions;  ovaries are gone, too       Social History     Tobacco Use     Smoking status: Former     Smokeless tobacco: Never   Substance Use Topics     Alcohol use: Yes     Comment: Alcoholic Drinks/day: occasional      Family History   Problem Relation Age of Onset     Prostate Cancer Father      Coronary Artery Disease Father      Hypertension Father      Hypertension Mother      Diabetes Mother      Cerebrovascular Disease Mother      Breast Cancer Cousin      Other Cancer Cousin      Anxiety Disorder Son      Anxiety Disorder Son      Mental Illness Sister          Current Outpatient Medications   Medication Sig Dispense Refill     anastrozole (ARIMIDEX) 1 mg tablet [ANASTROZOLE (ARIMIDEX) 1 MG TABLET] Take 1 mg by mouth daily.       CALCIUM CARBONATE/VITAMIN D3 (CALCIUM+D ORAL) [CALCIUM CARBONATE/VITAMIN D3 (CALCIUM+D ORAL)] Take 1 tablet by mouth daily.       hydroxychloroquine (PLAQUENIL) 200 MG tablet Take 1 tablet (200 mg) by mouth 2 times daily for 90 days 180 tablet 1     multivitamin (ONE A DAY) per tablet [MULTIVITAMIN (ONE A DAY) PER TABLET] Take 1 tablet by mouth daily.       No Known Allergies  Mammogram Screening:  has mammogram next week - follows regularly  Follows with oncologist  hospitals-7:       10/13/2021     6:58 AM 8/22/2022     7:29 PM 10/11/2023    11:10 AM   Breast CA Risk Assessment (FHS-7)   Did any of your first-degree relatives have breast or ovarian cancer? No Yes Yes   Did any of your relatives have bilateral breast cancer? No Unknown Unknown   Did any man in your family have breast cancer? No Unknown Unknown   Did any woman in your family have breast and ovarian cancer? No Yes Yes   Did any woman in your family have breast cancer before age 50 y? No Unknown Yes   Do you have 2 or more relatives with breast and/or ovarian cancer? No Yes Yes   Do you have 2 or more relatives with breast and/or bowel cancer? No Yes Yes       Mammogram Screening: Recommended mammography every 1-2 years with patient  "discussion and risk factor consideration  Pertinent mammograms are reviewed under the imaging tab.    Review of Systems   Constitutional:  Negative for chills and fever.   HENT:  Positive for congestion. Negative for ear pain, hearing loss and sore throat.    Eyes:  Negative for pain and visual disturbance.   Respiratory:  Positive for cough. Negative for shortness of breath.    Cardiovascular:  Negative for chest pain, palpitations and peripheral edema.   Gastrointestinal:  Negative for abdominal pain, constipation, diarrhea, heartburn, hematochezia and nausea.   Breasts:  Negative for tenderness, breast mass and discharge.   Genitourinary:  Negative for dysuria, frequency, genital sores, hematuria, pelvic pain, urgency, vaginal bleeding and vaginal discharge.   Musculoskeletal:  Negative for arthralgias, joint swelling and myalgias.   Skin:  Negative for rash.   Neurological:  Negative for dizziness, weakness, headaches and paresthesias.   Psychiatric/Behavioral:  Negative for mood changes. The patient is not nervous/anxious.          OBJECTIVE:   /77 (BP Location: Right arm, Patient Position: Sitting, Cuff Size: Adult Large)   Pulse 73   Temp 97.5  F (36.4  C) (Temporal)   Resp 18   Ht 1.71 m (5' 7.32\")   Wt 88.5 kg (195 lb)   LMP  (LMP Unknown)   SpO2 97%   BMI 30.25 kg/m   Estimated body mass index is 30.25 kg/m  as calculated from the following:    Height as of this encounter: 1.71 m (5' 7.32\").    Weight as of this encounter: 88.5 kg (195 lb).  Physical Exam  Vitals reviewed.   Constitutional:       General: She is not in acute distress.     Appearance: Normal appearance.   HENT:      Head: Normocephalic.      Right Ear: Tympanic membrane, ear canal and external ear normal.      Left Ear: Tympanic membrane, ear canal and external ear normal.      Nose: Nose normal.      Mouth/Throat:      Mouth: Mucous membranes are moist.      Pharynx: No posterior oropharyngeal erythema.   Eyes:      " Extraocular Movements: Extraocular movements intact.      Conjunctiva/sclera: Conjunctivae normal.      Pupils: Pupils are equal, round, and reactive to light.   Cardiovascular:      Rate and Rhythm: Normal rate and regular rhythm.      Pulses: Normal pulses.      Heart sounds: Normal heart sounds. No murmur heard.  Pulmonary:      Effort: Pulmonary effort is normal.      Breath sounds: Normal breath sounds.   Abdominal:      Palpations: Abdomen is soft. There is no mass.      Tenderness: There is no abdominal tenderness. There is no guarding or rebound.   Musculoskeletal:         General: No deformity. Normal range of motion.      Cervical back: Normal range of motion and neck supple.   Lymphadenopathy:      Cervical: No cervical adenopathy.   Skin:     General: Skin is warm and dry.   Neurological:      General: No focal deficit present.      Mental Status: She is alert.   Psychiatric:         Mood and Affect: Mood normal.         Behavior: Behavior normal.           Diagnostic Test Results:  Labs reviewed in Epic  Results for orders placed or performed in visit on 10/18/23   Comprehensive metabolic panel (BMP + Alb, Alk Phos, ALT, AST, Total. Bili, TP)     Status: Normal   Result Value Ref Range    Sodium 139 135 - 145 mmol/L    Potassium 4.2 3.4 - 5.3 mmol/L    Carbon Dioxide (CO2) 25 22 - 29 mmol/L    Anion Gap 11 7 - 15 mmol/L    Urea Nitrogen 16.1 8.0 - 23.0 mg/dL    Creatinine 0.76 0.51 - 0.95 mg/dL    GFR Estimate 86 >60 mL/min/1.73m2    Calcium 9.6 8.8 - 10.2 mg/dL    Chloride 103 98 - 107 mmol/L    Glucose 92 70 - 99 mg/dL    Alkaline Phosphatase 60 35 - 104 U/L    AST 34 0 - 45 U/L    ALT 37 0 - 50 U/L    Protein Total 7.0 6.4 - 8.3 g/dL    Albumin 4.7 3.5 - 5.2 g/dL    Bilirubin Total 0.3 <=1.2 mg/dL   CBC with platelets     Status: Normal   Result Value Ref Range    WBC Count 5.9 4.0 - 11.0 10e3/uL    RBC Count 4.21 3.80 - 5.20 10e6/uL    Hemoglobin 12.4 11.7 - 15.7 g/dL    Hematocrit 38.4 35.0 - 47.0  %    MCV 91 78 - 100 fL    MCH 29.5 26.5 - 33.0 pg    MCHC 32.3 31.5 - 36.5 g/dL    RDW 11.8 10.0 - 15.0 %    Platelet Count 284 150 - 450 10e3/uL   Vitamin D Deficiency     Status: Normal   Result Value Ref Range    Vitamin D, Total (25-Hydroxy) 46 20 - 50 ng/mL    Narrative    Season, race, dietary intake, and treatment affect the concentration of 25-hydroxy-Vitamin D. Values may decrease during winter months and increase during summer months.    Vitamin D determination is routinely performed by an immunoassay specific for 25 hydroxyvitamin D3.  If an individual is on vitamin D2(ergocalciferol) supplementation, please specify 25 OH vitamin D2 and D3 level determination by LCMSMS test VITD23.         ASSESSMENT / PLAN:   1. Encounter for Medicare annual wellness exam  This is a 69 yo female here for AWV -     2. Adult general medical exam  Due for physical exam - with AWV     3. Encounter for screening involving social determinants of health (SDoH)  Reviewed SDoH   - Primary Care - Care Coordination Referral; Future    4. Need for immunization against influenza  Due for seasonal flu vaccine - discussed - ordered   - INFLUENZA VACCINE 65+ (FLUZONE HD)    5. Seropositive rheumatoid arthritis of multiple sites (H)  H/o seropositive RA - check labs -   - Comprehensive metabolic panel (BMP + Alb, Alk Phos, ALT, AST, Total. Bili, TP); Future  - CBC with platelets; Future  - Comprehensive metabolic panel (BMP + Alb, Alk Phos, ALT, AST, Total. Bili, TP)  - CBC with platelets    6. S/P total hysterectomy  Patient is s/p total hysterectomy -     7. Malignant neoplasm of central portion of left breast in female, estrogen receptor positive (H)  H/o breast cancer - no sign of recurrence     8. Impacted cerumen of right ear  Has impacted cerumen right ear - unable to reach with ear curette - removed via ear lavage   - WA REMOVAL IMPACTED CERUMEN IRRIGATION/LVG UNILAT    9. Vitamin D deficiency  H/o low Vitamin D - continue to  "monitor levels   - Vitamin D Deficiency; Future  - Vitamin D Deficiency            COUNSELING:  Reviewed preventive health counseling, as reflected in patient instructions       Regular exercise       Healthy diet/nutrition      BMI:   Estimated body mass index is 30.25 kg/m  as calculated from the following:    Height as of this encounter: 1.71 m (5' 7.32\").    Weight as of this encounter: 88.5 kg (195 lb).         She reports that she has quit smoking. She has never used smokeless tobacco.      Appropriate preventive services were discussed with this patient, including applicable screening as appropriate for fall prevention, nutrition, physical activity, Tobacco-use cessation, weight loss and cognition.  Checklist reviewing preventive services available has been given to the patient.    Reviewed patients plan of care and provided an AVS. The Basic Care Plan (routine screening as documented in Health Maintenance) for Elza meets the Care Plan requirement. This Care Plan has been established and reviewed with the Patient.        LUIS JACKMAN MD  [facility]    Identified Health Risks:  I have reviewed Opioid Use Disorder and Substance Use Disorder risk factors and made any needed referrals.     Prior to immunization administration, verified patients identity using patient s name and date of birth. Please see Immunization Activity for additional information.     Screening Questionnaire for Adult Immunization    Are you sick today?   No   Do you have allergies to medications, food, a vaccine component or latex?   No   Have you ever had a serious reaction after receiving a vaccination?   Yes   Do you have a long-term health problem with heart, lung, kidney, or metabolic disease (e.g., diabetes), asthma, a blood disorder, no spleen, complement component deficiency, a cochlear implant, or a spinal fluid leak?  Are you on long-term aspirin therapy?   No   Do you have cancer, " leukemia, HIV/AIDS, or any other immune system problem?   No   Do you have a parent, brother, or sister with an immune system problem?   No   In the past 3 months, have you taken medications that affect  your immune system, such as prednisone, other steroids, or anticancer drugs; drugs for the treatment of rheumatoid arthritis, Crohn s disease, or psoriasis; or have you had radiation treatments?   No   Have you had a seizure, or a brain or other nervous system problem?   No   During the past year, have you received a transfusion of blood or blood    products, or been given immune (gamma) globulin or antiviral drug?   No   For women: Are you pregnant or is there a chance you could become       pregnant during the next month?   No   Have you received any vaccinations in the past 4 weeks?   No     Immunization questionnaire was positive for at least one answer.  Notified Dr. Resendiz.      Patient instructed to remain in clinic for 15 minutes afterwards, and to report any adverse reactions.     Screening performed by Aliza Mota CMA on 10/18/2023 at 7:06 AM.

## 2023-10-18 NOTE — COMMUNITY RESOURCES LIST (ENGLISH)
10/18/2023   [facility] - Outpatient Clinics  N/A  For additional resource needs, please contact your health insurance member services or your primary care team.  Phone: [phone]   Email: N/A   Address: [address] [place] [address]   Hours: N/A        Financial Stability       Utility payment assistance  1  [place] [facility] - Energy and Utilities Distance: 1.58 miles      In-Person, Phone/Virtual   [address] [place] [address]  Language: English  Hours: Mon - Fri 8:30 AM - 4:30 PM  Fees: Free   Phone: (892) 361-9799 Website: https://[place]/energy/consumer-assistance/energy-assistance-program/     2  [place] [facility] [place]'s Telephone Assistance Plan (TAP) and [facility] Lifeline and Affordable Connectivity Program (ACP) Distance: 8.11 miles      Phone/Virtual   [address] [place] [address]  Language: English  Fees: Free   Phone: (865) 314-6304 Email: bay.ketan@[facility].mn. Website: https://[place]/puc/consumers/telephone/          Important Numbers & Websites       [facility]   211 211unitedway.org  Poison Control   (199) 439-9019 [place]poison.[place]  Suicide and Crisis Lifeline   [address] [place], [place] [address]line.org  Childhelp [place] Child Abuse Hotline   364.544.2145 Childhelphotline.org  National Sexual Assault Hotline   (261) 361-8987 (HOPE) Rainn.org  National Runaway Safeline   (157) 585-4610 (RUNAWAY) 1800runaway.org  Pregnancy & Postpartum Support [place]   Call/text 947-003-3062 Ppsupportmn.org  Substance Abuse National Helpline ([facility]   502-600-HELP (6821) Findtreatment.gov  Emergency Services   911

## 2023-10-18 NOTE — COMMUNITY RESOURCES LIST (ENGLISH)
10/18/2023   [facility]  N/A  For questions about this resource list or additional care needs, please contact your primary care clinic or care manager.  Phone: [phone]   Email: N/A   Address: [address] [place] [id number]   Hours: N/A        Financial Stability       Utility payment assistance  1  [facility] Distance: 0.92 miles      Phone/Virtual   [address] [place] [address]  Language: English, Hmong, Kinyarwanda, Icelandic  Hours: Mon - Fri 10:00 AM - 3:00 PM  Fees: Free   Phone: (727) 636-5663 Email: Doe@[facility] Cent[facility]Parastructurey.org Website: http://[facility].org/community/[facility]/     2  Community Action Partnership (CAP) [facility] - Energy Assistance Distance: 1.47 miles      Phone/Virtual   [address] [place] [address]  Language: English, Hmong, Italian, Icelandic  Hours: Mon - Fri 8:00 AM - 4:30 PM  Fees: Free   Phone: (786) 699-3808 Email: info@caprw.org Website: http://www.caprw.org/          Important Numbers & Websites       Emergency Services   911  City Services   311  Poison Control   (100) 936-7226  Suicide Prevention Lifeline   (681) 971-9970 (TALK)  Child Abuse Hotline   (348) 791-2039 (4-A-Child)  Sexual Assault Hotline   (952) 163-4850 (HOPE)  National Runaway Safeline   (409) 415-3213 (RUNAWAY)  All-Options Talkline   (977) 946-9308  Substance Abuse Referral   (200) 556-7248 (HELP)

## 2023-10-20 NOTE — TELEPHONE ENCOUNTER
Left voicemail for patient regarding scheduling surgery with Dr. Ernandez.    Provided direct contact number to discuss.    P: [phone]    __    Shannon Schmitt, Senior Perioperative Coordinator, on 10/20/2023 at 3:56 PM

## 2023-10-23 ENCOUNTER — PATIENT OUTREACH (OUTPATIENT)
Dept: CARE COORDINATION | Facility: CLINIC | Age: 68
End: 2023-10-23
Payer: COMMERCIAL

## 2023-10-23 ENCOUNTER — ANCILLARY PROCEDURE (OUTPATIENT)
Dept: MAMMOGRAPHY | Facility: CLINIC | Age: 68
End: 2023-10-23
Attending: INTERNAL MEDICINE
Payer: COMMERCIAL

## 2023-10-23 DIAGNOSIS — Z12.31 SCREENING MAMMOGRAM, ENCOUNTER FOR: ICD-10-CM

## 2023-10-23 PROBLEM — G56.01 CARPAL TUNNEL SYNDROME OF RIGHT WRIST: Status: ACTIVE | Noted: 2023-10-10

## 2023-10-23 PROCEDURE — 77067 SCR MAMMO BI INCL CAD: CPT

## 2023-10-23 NOTE — TELEPHONE ENCOUNTER
RN Care Coordinator: Sami Hammond; [phone]    Surgery is scheduled with Dr. Ernandez  Date: 12/27   Location: [facility]        H&P to be completed by: Primary Care team - patient instructed to schedule with LUIS JACKMAN MD     Post-op: 1/9 with Dr. Ernandez  Additional visits: OT visit scheduled to follow post-op visit         Patient will receive a phone call from pre-admission nurses 1-2 days prior to surgery with arrival time and NPO instructions.       Spoke with the patient and was able to confirm all scheduled information.       Patient questions/concerns: N/A       Surgery packet was provided to patient during appointment    __    Shannon Schmitt, on 10/23/2023 at 10:53 AM  P: [phone]

## 2023-10-23 NOTE — PROGRESS NOTES
Clinic Care Coordination Contact:  Community Health Worker Initial Outreach    Reason: CCC CHW Initial Outreach Called- Referral to Clinic Care Coordination (CCC) Service  Referral provider/name: Deyanira Meraz MD     Potential Patient Outreach Discussion:   Attempted #1: Patient was identified as a potential candidate and referred to Clinic Care Coordination Service. CHW call and spoke with patient today, 10- to discuss possible clinic care coordination enrollment. Have introduced myself to patient. Clinic care coordination service was described to the patient and immediate needs were discussed. Patient is aware CCC team includes CHW, SW, RN, and FRW. The patient declined enrollment into CCC service at this time. CHW suggested patient to connect with PCP or referral provider office in the future to be re-refer to CCC service if change mind. Patient confirmed and thank today's call.     Patient accepts CC: No, has no resources need or concerns at this time per patient.      Plan:   -No further action need from CCC service at this time.   -Patient connect with PCP or referral provider office in the future to be re-refer to Clinic Care Coordination Service if change mind.   -Message routed to updates referral provider.

## 2023-11-13 ENCOUNTER — TRANSFERRED RECORDS (OUTPATIENT)
Dept: HEALTH INFORMATION MANAGEMENT | Facility: CLINIC | Age: 68
End: 2023-11-13
Payer: COMMERCIAL

## 2023-11-13 NOTE — TELEPHONE ENCOUNTER
Rescheduled surgery per patient.     Spoke with the patient and was able to confirm all scheduled information.     Surgery is rescheduled with Dr. Ernandez   Date: 12/20   Location: [facility]      H&P to be completed by: Primary Care team - scheduled on 12/4     Post-op: rescheduled to 1/2 with Liliya Martinez CSC      Patient will receive a phone call from pre-admission nurses 1-2 days prior to surgery with arrival time and NPO instructions.     Patient questions/concerns: N/A       Surgery packet: sent previously     __    Shannon Schmitt Senior Perioperative Coordinator, on 11/13/2023 at 2:48 PM  P: [phone]

## 2023-12-04 ENCOUNTER — OFFICE VISIT (OUTPATIENT)
Dept: FAMILY MEDICINE | Facility: CLINIC | Age: 68
End: 2023-12-04
Payer: COMMERCIAL

## 2023-12-04 VITALS
OXYGEN SATURATION: 98 % | SYSTOLIC BLOOD PRESSURE: 137 MMHG | WEIGHT: 199 LBS | DIASTOLIC BLOOD PRESSURE: 75 MMHG | HEART RATE: 67 BPM | TEMPERATURE: 97.5 F | BODY MASS INDEX: 30.16 KG/M2 | RESPIRATION RATE: 12 BRPM | HEIGHT: 68 IN

## 2023-12-04 DIAGNOSIS — C50.112 MALIGNANT NEOPLASM OF CENTRAL PORTION OF LEFT BREAST IN FEMALE, ESTROGEN RECEPTOR POSITIVE (H): ICD-10-CM

## 2023-12-04 DIAGNOSIS — Z29.11 NEED FOR PROPHYLACTIC VACCINATION AND INOCULATION AGAINST RESPIRATORY SYNCYTIAL VIRUS (RSV): ICD-10-CM

## 2023-12-04 DIAGNOSIS — M05.79 SEROPOSITIVE RHEUMATOID ARTHRITIS OF MULTIPLE SITES (H): ICD-10-CM

## 2023-12-04 DIAGNOSIS — Z29.11 NEED FOR VACCINATION AGAINST RESPIRATORY SYNCYTIAL VIRUS: ICD-10-CM

## 2023-12-04 DIAGNOSIS — Z17.0 MALIGNANT NEOPLASM OF CENTRAL PORTION OF LEFT BREAST IN FEMALE, ESTROGEN RECEPTOR POSITIVE (H): ICD-10-CM

## 2023-12-04 DIAGNOSIS — G56.01 CARPAL TUNNEL SYNDROME OF RIGHT WRIST: ICD-10-CM

## 2023-12-04 DIAGNOSIS — Z01.818 PREOP EXAMINATION: Primary | ICD-10-CM

## 2023-12-04 LAB — HGB BLD-MCNC: 12.6 G/DL (ref 11.7–15.7)

## 2023-12-04 PROCEDURE — 90678 RSV VACC PREF BIVALENT IM: CPT | Performed by: FAMILY MEDICINE

## 2023-12-04 PROCEDURE — 36415 COLL VENOUS BLD VENIPUNCTURE: CPT | Performed by: FAMILY MEDICINE

## 2023-12-04 PROCEDURE — 99214 OFFICE O/P EST MOD 30 MIN: CPT | Mod: 25 | Performed by: FAMILY MEDICINE

## 2023-12-04 PROCEDURE — 85018 HEMOGLOBIN: CPT | Performed by: FAMILY MEDICINE

## 2023-12-04 PROCEDURE — 90471 IMMUNIZATION ADMIN: CPT | Performed by: FAMILY MEDICINE

## 2023-12-04 ASSESSMENT — ENCOUNTER SYMPTOMS
WEAKNESS: 0
FREQUENCY: 0
ALLERGIC/IMMUNOLOGIC NEGATIVE: 1
HEADACHES: 0
MYALGIAS: 0
SHORTNESS OF BREATH: 0
ABDOMINAL PAIN: 0
PALPITATIONS: 0
DIZZINESS: 0
PARESTHESIAS: 0
FEVER: 0
DIARRHEA: 0
ARTHRALGIAS: 0
DYSURIA: 0
BRUISES/BLEEDS EASILY: 0
NUMBNESS: 1
EYE PAIN: 0
PSYCHIATRIC NEGATIVE: 1
SORE THROAT: 0
COUGH: 0
CHILLS: 0
CONSTIPATION: 0

## 2023-12-04 NOTE — PROGRESS NOTES
[facility] [address] [place] [address]  Phone: [phone]  Fax: [phone]  Primary Provider: Deyanira Meraz  Pre-op Performing Provider: DEYANIRA MERAZ      PREOPERATIVE EVALUATION:  Today's date: 12/4/2023    Elza is a 68 year old, presenting for the following:  Pre-Op Exam        12/4/2023     7:37 AM   Additional Questions   Roomed by Derick GARCIA   Accompanied by self       Surgical Information:  Surgery/Procedure: RIGHT open carpal tunnel release   Surgery Location: [facility]   Surgeon: Chiki Ernandez MD   Surgery Date: 12/20/2023  Time of Surgery: 9 am  Where patient plans to recover: At home with family  Fax number for surgical facility: Note does not need to be faxed, will be available electronically in Epic.    Assessment & Plan     The proposed surgical procedure is considered LOW risk.    Problem List Items Addressed This Visit       Seropositive rheumatoid arthritis of multiple sites (H)    Malignant neoplasm of central portion of left breast in female, estrogen receptor positive (H)    Carpal tunnel syndrome of right wrist     Other Visit Diagnoses       Preop examination    -  Primary                    - No identified additional risk factors other than previously addressed    Antiplatelet or Anticoagulation Medication Instructions:   - Patient is on no antiplatelet or anticoagulation medications.    Additional Medication Instructions:  Patient is to take all scheduled medications on the day of surgery    RECOMMENDATION:  APPROVAL GIVEN to proceed with proposed procedure, without further diagnostic evaluation.            Subjective       HPI related to upcoming procedure: right hand numbness x 1-1/2 years - started last year of working - not getting better - can't sleep - wakes up 2-3 x/night             11/27/2023    12:49 PM   Preop Questions   1. Have you ever had a heart attack  "or stroke? No   2. Have you ever had surgery on your heart or blood vessels, such as a stent placement, a coronary artery bypass, or surgery on an artery in your head, neck, heart, or legs? No   3. Do you have chest pain with activity? No   4. Do you have a history of  heart failure? No   5. Do you currently have a cold, bronchitis or symptoms of other infection? No   6. Do you have a cough, shortness of breath, or wheezing? No   7. Do you or anyone in your family have previous history of blood clots? UNKNOWN - none known per pt.    8. Do you or does anyone in your family have a serious bleeding problem such as prolonged bleeding following surgeries or cuts? UNKNOWN - none known per pt   9. Have you ever had problems with anemia or been told to take iron pills? YES - with pregnancies   10. Have you had any abnormal blood loss such as black, tarry or bloody stools, or abnormal vaginal bleeding? No   11. Have you ever had a blood transfusion? No   12. Are you willing to have a blood transfusion if it is medically needed before, during, or after your surgery? Yes   13. Have you or any of your relatives ever had problems with anesthesia? No   14. Do you have sleep apnea, excessive snoring or daytime drowsiness? No   15. Do you have any artifical heart valves or other implanted medical devices like a pacemaker, defibrillator, or continuous glucose monitor? No   16. Do you have artificial joints? No   17. Are you allergic to latex? No       Health Care Directive:  Patient does not have a Health Care Directive or Living Will: discussed - \"working on it\"     Preoperative Review of :   reviewed - no record of controlled substances prescribed.      Status of Chronic Conditions:  See problem list for active medical problems.  Problems all longstanding and stable, except as noted/documented.  See ROS for pertinent symptoms related to these conditions.    Review of Systems   Constitutional:  Negative for chills and fever. "   HENT:  Negative for congestion, ear pain and sore throat.    Eyes:  Negative for pain and visual disturbance.   Respiratory:  Negative for cough and shortness of breath.    Cardiovascular:  Negative for chest pain, palpitations and peripheral edema.   Gastrointestinal:  Negative for abdominal pain, constipation and diarrhea.   Endocrine: Negative for polyuria.   Genitourinary:  Negative for dysuria, frequency and vaginal discharge.   Musculoskeletal:  Negative for arthralgias and myalgias.   Skin:  Negative for rash.   Allergic/Immunologic: Negative.    Neurological:  Positive for numbness (right hand). Negative for dizziness, weakness, headaches and paresthesias.   Hematological:  Does not bruise/bleed easily.   Psychiatric/Behavioral: Negative.     All other systems reviewed and are negative.        Patient Active Problem List    Diagnosis Date Noted    Carpal tunnel syndrome of right wrist 10/10/2023     Priority: Medium    Allergy to penicillin 12/01/2020     Priority: Medium    Malignant neoplasm of central portion of left breast in female, estrogen receptor positive (H) 09/16/2020     Priority: Medium     Added automatically from request for surgery [id number]        Lumbar paraspinal muscle spasm 09/17/2018     Priority: Medium    Trochanteric bursitis, left hip 08/28/2018     Priority: Medium    Menopause 06/29/2018     Priority: Medium    S/P total hysterectomy 06/29/2018     Priority: Medium     Done for fibroid        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      Seropositive rheumatoid arthritis of multiple sites (H) 03/30/2016     Priority: Medium    Health care maintenance 02/04/2016     Priority: Medium    High risk medication use 10/05/2015      Priority: Medium    Ulnar neuropathy 06/15/2015     Priority: Medium      Past Medical History:   Diagnosis Date    H/O small bowel obstruction     Rheumatoid arthritis (H)      Past Surgical History:   Procedure Laterality Date    HYSTERECTOMY  2008    OOPHORECTOMY  2008    CT MASTECTOMY, PARTIAL Left 9/23/2020    Procedure: Left Lumpectomy after Wire Loclaization; [place] Lymph Node Biopsy;  Surgeon: Анна Coffey MD;  Location: [facility];  Service: General    US BIOPSY FINE NEEDLE ASPIRATION LYMPH NODE (BREAST) LEFT Left 9/3/2020    US BREAST CORE BIOPSY LEFT Left 9/3/2020    US BREAST LOC W SENT NODE INJ LEFT Left 9/23/2020    [facility] FREEING BOWEL ADHESION,ENTEROLYSIS      Description: Laparoscopic Lysis Of Intestinal Adhesions;  Recorded: 08/22/2008;    [facility] TOTAL ABDOM HYSTERECTOMY      Description: Total Abdominal Hysterectomy;  Proc Date: 01/01/2005;  Comments: for uterine fibroids and adhesions; ovaries are gone, too     Current Outpatient Medications   Medication Sig Dispense Refill    anastrozole (ARIMIDEX) 1 mg tablet [ANASTROZOLE (ARIMIDEX) 1 MG TABLET] Take 1 mg by mouth daily.      CALCIUM CARBONATE/VITAMIN D3 (CALCIUM+D ORAL) [CALCIUM CARBONATE/VITAMIN D3 (CALCIUM+D ORAL)] Take 1 tablet by mouth daily.      multivitamin (ONE A DAY) per tablet [MULTIVITAMIN (ONE A DAY) PER TABLET] Take 1 tablet by mouth daily.      hydroxychloroquine (PLAQUENIL) 200 MG tablet Take 1 tablet (200 mg) by mouth 2 times daily for 90 days 180 tablet 1       No Known Allergies     Social History     Tobacco Use    Smoking status: Former    Smokeless tobacco: Never   Substance Use Topics    Alcohol use: Yes     Comment: Alcoholic Drinks/day: occasional      Family History   Problem Relation Age of Onset    Prostate Cancer Father     Coronary Artery Disease Father     Hypertension Father     Hypertension Mother     Diabetes Mother     Cerebrovascular Disease Mother     Breast Cancer Cousin     Other Cancer Cousin      "Anxiety Disorder Son     Anxiety Disorder Son     Mental Illness Sister      History   Drug Use Unknown         Objective     /75 (BP Location: Right arm, Patient Position: Sitting, Cuff Size: Adult Large)   Pulse 67   Temp 97.5  F (36.4  C) (Temporal)   Resp 12   Ht 1.719 m (5' 7.68\")   Wt 90.3 kg (199 lb)   LMP  (LMP Unknown)   SpO2 98%   BMI 30.55 kg/m      Physical Exam  Vitals reviewed.   Constitutional:       General: She is not in acute distress.     Appearance: Normal appearance.   HENT:      Head: Normocephalic.      Right Ear: Tympanic membrane, ear canal and external ear normal.      Left Ear: Tympanic membrane, ear canal and external ear normal.      Nose: Nose normal.      Mouth/Throat:      Mouth: Mucous membranes are moist.      Pharynx: No posterior oropharyngeal erythema.   Eyes:      Extraocular Movements: Extraocular movements intact.      Conjunctiva/sclera: Conjunctivae normal.      Pupils: Pupils are equal, round, and reactive to light.   Cardiovascular:      Rate and Rhythm: Normal rate and regular rhythm.      Pulses: Normal pulses.      Heart sounds: Normal heart sounds. No murmur heard.  Pulmonary:      Effort: Pulmonary effort is normal.      Breath sounds: Normal breath sounds.   Abdominal:      Palpations: Abdomen is soft. There is no mass.      Tenderness: There is no abdominal tenderness. There is no guarding or rebound.   Musculoskeletal:         General: No deformity. Normal range of motion.      Cervical back: Normal range of motion and neck supple.   Lymphadenopathy:      Cervical: No cervical adenopathy.   Skin:     General: Skin is warm and dry.   Neurological:      General: No focal deficit present.      Mental Status: She is alert.   Psychiatric:         Mood and Affect: Mood normal.         Behavior: Behavior normal.           Recent Labs   Lab Test 10/18/23  0758 04/03/23  0953 08/23/22  0749   HGB 12.4 12.6 12.3    312 301     --  140   POTASSIUM " 4.2  --  4.5   CR 0.76 0.85 0.73        Diagnostics:  Recent Results (from the past 168 hour(s))   Hemoglobin    Collection Time: 12/04/23  8:19 AM   Result Value Ref Range    Hemoglobin 12.6 11.7 - 15.7 g/dL   Albumin level    Collection Time: 12/08/23  8:22 AM   Result Value Ref Range    Albumin 4.3 3.5 - 5.2 g/dL   ALT    Collection Time: 12/08/23  8:22 AM   Result Value Ref Range    ALT 31 0 - 50 U/L   CBC with platelets    Collection Time: 12/08/23  8:22 AM   Result Value Ref Range    WBC Count 6.3 4.0 - 11.0 10e3/uL    RBC Count 4.32 3.80 - 5.20 10e6/uL    Hemoglobin 12.6 11.7 - 15.7 g/dL    Hematocrit 39.3 35.0 - 47.0 %    MCV 91 78 - 100 fL    MCH 29.2 26.5 - 33.0 pg    MCHC 32.1 31.5 - 36.5 g/dL    RDW 11.7 10.0 - 15.0 %    Platelet Count 287 150 - 450 10e3/uL   Creatinine    Collection Time: 12/08/23  8:22 AM   Result Value Ref Range    Creatinine 0.79 0.51 - 0.95 mg/dL    GFR Estimate 82 >60 mL/min/1.73m2      No EKG required for low risk surgery (cataract, skin procedure, breast biopsy, etc).    Revised Cardiac Risk Index (RCRI):  The patient has the following serious cardiovascular risks for perioperative complications:   - No serious cardiac risks = 0 points     RCRI Interpretation: 0 points: Class I (very low risk - 0.4% complication rate)         Signed Electronically by: LUIS JACKMAN MD  Copy of this evaluation report is provided to requesting physician.

## 2023-12-07 ENCOUNTER — TELEPHONE (OUTPATIENT)
Dept: RHEUMATOLOGY | Facility: CLINIC | Age: 68
End: 2023-12-07
Payer: COMMERCIAL

## 2023-12-07 DIAGNOSIS — M05.79 SEROPOSITIVE RHEUMATOID ARTHRITIS OF MULTIPLE SITES (H): Primary | ICD-10-CM

## 2023-12-07 NOTE — TELEPHONE ENCOUNTER
[facility] Call Center    Phone Message    May a detailed message be left on voicemail: yes     Reason for Call: Order(s): Other:   Reason for requested: labs  Date needed: ASAP  Provider name: Dr. Pryor     Action Taken: Other: rheum    Travel Screening: Not Applicable

## 2023-12-08 ENCOUNTER — LAB (OUTPATIENT)
Dept: LAB | Facility: CLINIC | Age: 68
End: 2023-12-08
Payer: COMMERCIAL

## 2023-12-08 DIAGNOSIS — M05.79 SEROPOSITIVE RHEUMATOID ARTHRITIS OF MULTIPLE SITES (H): ICD-10-CM

## 2023-12-08 LAB
ALBUMIN SERPL BCG-MCNC: 4.3 G/DL (ref 3.5–5.2)
ALT SERPL W P-5'-P-CCNC: 31 U/L (ref 0–50)
CREAT SERPL-MCNC: 0.79 MG/DL (ref 0.51–0.95)
EGFRCR SERPLBLD CKD-EPI 2021: 82 ML/MIN/1.73M2
ERYTHROCYTE [DISTWIDTH] IN BLOOD BY AUTOMATED COUNT: 11.7 % (ref 10–15)
HCT VFR BLD AUTO: 39.3 % (ref 35–47)
HGB BLD-MCNC: 12.6 G/DL (ref 11.7–15.7)
MCH RBC QN AUTO: 29.2 PG (ref 26.5–33)
MCHC RBC AUTO-ENTMCNC: 32.1 G/DL (ref 31.5–36.5)
MCV RBC AUTO: 91 FL (ref 78–100)
PLATELET # BLD AUTO: 287 10E3/UL (ref 150–450)
RBC # BLD AUTO: 4.32 10E6/UL (ref 3.8–5.2)
WBC # BLD AUTO: 6.3 10E3/UL (ref 4–11)

## 2023-12-08 PROCEDURE — 82565 ASSAY OF CREATININE: CPT

## 2023-12-08 PROCEDURE — 84460 ALANINE AMINO (ALT) (SGPT): CPT

## 2023-12-08 PROCEDURE — 82040 ASSAY OF SERUM ALBUMIN: CPT

## 2023-12-08 PROCEDURE — 85027 COMPLETE CBC AUTOMATED: CPT

## 2023-12-08 PROCEDURE — 36415 COLL VENOUS BLD VENIPUNCTURE: CPT

## 2023-12-11 ENCOUNTER — OFFICE VISIT (OUTPATIENT)
Dept: RHEUMATOLOGY | Facility: CLINIC | Age: 68
End: 2023-12-11
Payer: COMMERCIAL

## 2023-12-11 VITALS
DIASTOLIC BLOOD PRESSURE: 82 MMHG | HEART RATE: 74 BPM | WEIGHT: 196.7 LBS | SYSTOLIC BLOOD PRESSURE: 130 MMHG | OXYGEN SATURATION: 98 % | BODY MASS INDEX: 30.19 KG/M2

## 2023-12-11 DIAGNOSIS — Z79.899 HIGH RISK MEDICATION USE: ICD-10-CM

## 2023-12-11 DIAGNOSIS — M05.79 SEROPOSITIVE RHEUMATOID ARTHRITIS OF MULTIPLE SITES (H): Primary | ICD-10-CM

## 2023-12-11 DIAGNOSIS — R76.8 CYCLIC CITRULLINATED PEPTIDE (CCP) ANTIBODY POSITIVE: ICD-10-CM

## 2023-12-11 PROCEDURE — 99214 OFFICE O/P EST MOD 30 MIN: CPT | Performed by: INTERNAL MEDICINE

## 2023-12-11 RX ORDER — HYDROXYCHLOROQUINE SULFATE 200 MG/1
200 TABLET, FILM COATED ORAL 2 TIMES DAILY
Qty: 180 TABLET | Refills: 1 | Status: SHIPPED | OUTPATIENT
Start: 2023-12-11 | End: 2024-06-18

## 2023-12-11 NOTE — PROGRESS NOTES
Rheumatology follow-up visit note     Elza is a 68 year old female presents today for follow-up.    Elza was seen today for recheck.    Diagnoses and all orders for this visit:    Seropositive rheumatoid arthritis of multiple sites (H)  -     hydroxychloroquine (PLAQUENIL) 200 MG tablet; Take 1 tablet (200 mg) by mouth 2 times daily    High risk medication use    Cyclic citrullinated peptide (CCP) antibody positive  -     hydroxychloroquine (PLAQUENIL) 200 MG tablet; Take 1 tablet (200 mg) by mouth 2 times daily    Blood pressure 130/82, pulse 74, weight 89.2 kg (196 lb 11.2 oz), SpO2 98%.      Well-controlled seropositive rheumatoid arthritis on hydroxychloroquine that she has tolerated well.  She is doing great.  Will meet here in 12 months.  Labs every 6 months.    Follow up in 12  months.    HPI    Elza Polk is a 68 year old female is here for follow-up of rheumatoid arthritis.  This is seropositive.  Polyarticular longstanding.  At 1 point she had tried to be off all medications including hydroxychloroquine and had a relapse of her symptoms.  She has tolerated restarting the hydroxychloroquine very well.  No GI symptoms cutaneous issues.  She had RI examined earlier this year was reassured no evidence of maculopathy noted those data reviewed.  Recent labs are entirely normal.  She is taking up cross-country skiing.  She is no longer having any further palindrome's of rheumatoid arthritis.  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  She exercises both aerobic and strength training regularly.       DETAILED EXAMINATION  12/11/23  :    Vitals:    12/11/23 0954   BP: 130/82   Pulse: 74   SpO2: 98%   Weight: 89.2 kg (196 lb 11.2 oz)     Alert oriented. Head including the face is examined for malar rash, heliotropes, scarring, lupus pernio. Eyes examined for redness such as in episcleritis/scleritis, periorbital lesions.   Neck/ Face examined for parotid gland swelling, range  of motion of neck.  Left upper and lower and right upper and lower extremities examined for tenderness, swelling, warmth of the appendicular joints, range of motion, edema, rash.  Some of the important findings included: she does not have evidence of synovitis in the palpable joints of the upper extremities.  No significant deformities of the digits.  no Heberden nodes.  Range of motion of the shoulders   show full abduction.  No JLT effusion or warmth of the knees.  she does not have dactylitis of the digits.     Patient Active Problem List    Diagnosis Date Noted    Carpal tunnel syndrome of right wrist 10/10/2023     Priority: Medium    Allergy to penicillin 12/01/2020     Priority: Medium    Malignant neoplasm of central portion of left breast in female, estrogen receptor positive (H) 09/16/2020     Priority: Medium     Added automatically from request for surgery [id number]        Lumbar paraspinal muscle spasm 09/17/2018     Priority: Medium    Trochanteric bursitis, left hip 08/28/2018     Priority: Medium    Menopause 06/29/2018     Priority: Medium    S/P total hysterectomy 06/29/2018     Priority: Medium     Done for fibroid        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      Seropositive rheumatoid arthritis of multiple sites (H) 03/30/2016     Priority: Medium    Health care maintenance 02/04/2016     Priority: Medium    High risk medication use 10/05/2015     Priority: Medium    Ulnar neuropathy 06/15/2015     Priority: Medium     Past Surgical History:   Procedure Laterality Date    HYSTERECTOMY  2008    OOPHORECTOMY  2008    MS MASTECTOMY, PARTIAL Left 9/23/2020    Procedure: Left Lumpectomy after Wire Loclaization; [place] Lymph Node Biopsy;  Surgeon:  Анна Coffey MD;  Location: [facility];  Service: General    US BIOPSY FINE NEEDLE ASPIRATION LYMPH NODE (BREAST) LEFT Left 9/3/2020    US BREAST CORE BIOPSY LEFT Left 9/3/2020    US BREAST LOC W SENT NODE INJ LEFT Left 9/23/2020    [facility] FREEING BOWEL ADHESION,ENTEROLYSIS      Description: Laparoscopic Lysis Of Intestinal Adhesions;  Recorded: 08/22/2008;    [facility] TOTAL ABDOM HYSTERECTOMY      Description: Total Abdominal Hysterectomy;  Proc Date: 01/01/2005;  Comments: for uterine fibroids and adhesions; ovaries are gone, too      Past Medical History:   Diagnosis Date    H/O small bowel obstruction     Rheumatoid arthritis (H)      No Known Allergies  Current Outpatient Medications   Medication Sig Dispense Refill    anastrozole (ARIMIDEX) 1 mg tablet [ANASTROZOLE (ARIMIDEX) 1 MG TABLET] Take 1 mg by mouth daily.      CALCIUM CARBONATE/VITAMIN D3 (CALCIUM+D ORAL) [CALCIUM CARBONATE/VITAMIN D3 (CALCIUM+D ORAL)] Take 1 tablet by mouth daily.      hydroxychloroquine (PLAQUENIL) 200 MG tablet Take 1 tablet (200 mg) by mouth 2 times daily for 90 days 180 tablet 1    multivitamin (ONE A DAY) per tablet [MULTIVITAMIN (ONE A DAY) PER TABLET] Take 1 tablet by mouth daily.       family history includes Anxiety Disorder in her son and son; Breast Cancer in her cousin; Cerebrovascular Disease in her mother; Coronary Artery Disease in her father; Diabetes in her mother; Hypertension in her father and mother; Mental Illness in her sister; Other Cancer in her cousin; Prostate Cancer in her father.  Social Connections: Not on file          WBC Count   Date Value Ref Range Status   12/08/2023 6.3 4.0 - 11.0 10e3/uL Final     RBC Count   Date Value Ref Range Status   12/08/2023 4.32 3.80 - 5.20 10e6/uL Final     Hemoglobin   Date Value Ref Range Status   12/08/2023 12.6 11.7 - 15.7 g/dL Final     Hematocrit   Date Value Ref Range Status   12/08/2023 39.3 35.0 - 47.0 % Final     MCV   Date Value Ref Range Status   12/08/2023  91 78 - 100 fL Final     MCH   Date Value Ref Range Status   12/08/2023 29.2 26.5 - 33.0 pg Final     Platelet Count   Date Value Ref Range Status   12/08/2023 287 150 - 450 10e3/uL Final     % Lymphocytes   Date Value Ref Range Status   08/31/2020 34 20 - 40 % Final     AST   Date Value Ref Range Status   10/18/2023 34 0 - 45 U/L Final     Comment:     Reference intervals for this test were updated on 6/12/2023 to more accurately reflect our healthy population. There may be differences in the flagging of prior results with similar values performed with this method. Interpretation of those prior results can be made in the context of the updated reference intervals.     ALT   Date Value Ref Range Status   12/08/2023 31 0 - 50 U/L Final     Comment:     Reference intervals for this test were updated on 6/12/2023 to more accurately reflect our healthy population. There may be differences in the flagging of prior results with similar values performed with this method. Interpretation of those prior results can be made in the context of the updated reference intervals.       Albumin   Date Value Ref Range Status   12/08/2023 4.3 3.5 - 5.2 g/dL Final   03/30/2022 3.8 3.5 - 5.0 g/dL Final     Alkaline Phosphatase   Date Value Ref Range Status   10/18/2023 60 35 - 104 U/L Final     Creatinine   Date Value Ref Range Status   12/08/2023 0.79 0.51 - 0.95 mg/dL Final     GFR Estimate   Date Value Ref Range Status   12/08/2023 82 >60 mL/min/1.73m2 Final   06/23/2021 >60 >60 mL/min/1.73m2 Final     GFR Estimate If Black   Date Value Ref Range Status   06/23/2021 >60 >60 mL/min/1.73m2 Final     Creatinine Urine mg/dL (External)   Date Value Ref Range Status   03/13/2018 240.4 mg/dL Final

## 2023-12-16 ENCOUNTER — HEALTH MAINTENANCE LETTER (OUTPATIENT)
Age: 68
End: 2023-12-16

## 2023-12-18 ENCOUNTER — ANESTHESIA EVENT (OUTPATIENT)
Dept: SURGERY | Facility: AMBULATORY SURGERY CENTER | Age: 68
End: 2023-12-18
Payer: COMMERCIAL

## 2023-12-18 RX ORDER — MEPERIDINE HYDROCHLORIDE 25 MG/ML
12.5 INJECTION INTRAMUSCULAR; INTRAVENOUS; SUBCUTANEOUS EVERY 5 MIN PRN
Status: CANCELLED | OUTPATIENT
Start: 2023-12-18

## 2023-12-18 RX ORDER — LORAZEPAM 2 MG/ML
.5-1 INJECTION INTRAMUSCULAR
Status: CANCELLED | OUTPATIENT
Start: 2023-12-18

## 2023-12-18 RX ORDER — FENTANYL CITRATE 50 UG/ML
50 INJECTION, SOLUTION INTRAMUSCULAR; INTRAVENOUS EVERY 5 MIN PRN
Status: CANCELLED | OUTPATIENT
Start: 2023-12-18

## 2023-12-18 RX ORDER — OXYCODONE HYDROCHLORIDE 5 MG/1
10 TABLET ORAL
Status: CANCELLED | OUTPATIENT
Start: 2023-12-18

## 2023-12-18 RX ORDER — ACETAMINOPHEN 325 MG/1
975 TABLET ORAL ONCE
Status: CANCELLED | OUTPATIENT
Start: 2023-12-18 | End: 2023-12-18

## 2023-12-18 RX ORDER — DEXAMETHASONE SODIUM PHOSPHATE 4 MG/ML
4 INJECTION, SOLUTION INTRA-ARTICULAR; INTRALESIONAL; INTRAMUSCULAR; INTRAVENOUS; SOFT TISSUE
Status: CANCELLED | OUTPATIENT
Start: 2023-12-18

## 2023-12-18 RX ORDER — ONDANSETRON 2 MG/ML
4 INJECTION INTRAMUSCULAR; INTRAVENOUS EVERY 30 MIN PRN
Status: CANCELLED | OUTPATIENT
Start: 2023-12-18

## 2023-12-18 RX ORDER — SODIUM CHLORIDE, SODIUM LACTATE, POTASSIUM CHLORIDE, CALCIUM CHLORIDE 600; 310; 30; 20 MG/100ML; MG/100ML; MG/100ML; MG/100ML
INJECTION, SOLUTION INTRAVENOUS CONTINUOUS
Status: CANCELLED | OUTPATIENT
Start: 2023-12-18

## 2023-12-18 RX ORDER — FENTANYL CITRATE 50 UG/ML
25 INJECTION, SOLUTION INTRAMUSCULAR; INTRAVENOUS
Status: CANCELLED | OUTPATIENT
Start: 2023-12-18

## 2023-12-18 RX ORDER — KETOROLAC TROMETHAMINE 30 MG/ML
15 INJECTION, SOLUTION INTRAMUSCULAR; INTRAVENOUS
Status: CANCELLED | OUTPATIENT
Start: 2023-12-18

## 2023-12-18 RX ORDER — ONDANSETRON 4 MG/1
4 TABLET, ORALLY DISINTEGRATING ORAL EVERY 30 MIN PRN
Status: CANCELLED | OUTPATIENT
Start: 2023-12-18

## 2023-12-18 RX ORDER — LABETALOL HYDROCHLORIDE 5 MG/ML
10 INJECTION, SOLUTION INTRAVENOUS
Status: CANCELLED | OUTPATIENT
Start: 2023-12-18

## 2023-12-18 RX ORDER — ALBUTEROL SULFATE 0.83 MG/ML
2.5 SOLUTION RESPIRATORY (INHALATION) EVERY 4 HOURS PRN
Status: CANCELLED | OUTPATIENT
Start: 2023-12-18

## 2023-12-18 RX ORDER — HYDRALAZINE HYDROCHLORIDE 20 MG/ML
2.5-5 INJECTION INTRAMUSCULAR; INTRAVENOUS EVERY 10 MIN PRN
Status: CANCELLED | OUTPATIENT
Start: 2023-12-18

## 2023-12-18 RX ORDER — FENTANYL CITRATE 50 UG/ML
25 INJECTION, SOLUTION INTRAMUSCULAR; INTRAVENOUS EVERY 5 MIN PRN
Status: CANCELLED | OUTPATIENT
Start: 2023-12-18

## 2023-12-18 RX ORDER — HYDROXYZINE HYDROCHLORIDE 10 MG/1
10 TABLET, FILM COATED ORAL EVERY 6 HOURS PRN
Status: CANCELLED | OUTPATIENT
Start: 2023-12-18

## 2023-12-18 RX ORDER — HALOPERIDOL 5 MG/ML
1 INJECTION INTRAMUSCULAR
Status: CANCELLED | OUTPATIENT
Start: 2023-12-18

## 2023-12-18 RX ORDER — DIMENHYDRINATE 50 MG/ML
25 INJECTION, SOLUTION INTRAMUSCULAR; INTRAVENOUS
Status: CANCELLED | OUTPATIENT
Start: 2023-12-18

## 2023-12-18 RX ORDER — OXYCODONE HYDROCHLORIDE 5 MG/1
5 TABLET ORAL
Status: CANCELLED | OUTPATIENT
Start: 2023-12-18

## 2023-12-18 NOTE — ANESTHESIA PREPROCEDURE EVALUATION
Anesthesia Pre-Procedure Evaluation    Patient: Elza Polk   MRN: [id number] : 1955        Procedure : Procedure(s):  RIGHT open carpal tunnel release          Past Medical History:   Diagnosis Date    H/O small bowel obstruction     Rheumatoid arthritis (H)       Past Surgical History:   Procedure Laterality Date    HYSTERECTOMY      OOPHORECTOMY      VT MASTECTOMY, PARTIAL Left 2020    Procedure: Left Lumpectomy after Wire Loclaization; [place] Lymph Node Biopsy;  Surgeon: Анна Coffey MD;  Location: [facility];  Service: General    US BIOPSY FINE NEEDLE ASPIRATION LYMPH NODE (BREAST) LEFT Left 9/3/2020    US BREAST CORE BIOPSY LEFT Left 9/3/2020    US BREAST LOC W SENT NODE INJ LEFT Left 2020    [facility] FREEING BOWEL ADHESION,ENTEROLYSIS      Description: Laparoscopic Lysis Of Intestinal Adhesions;  Recorded: 2008;    [facility] TOTAL ABDOM HYSTERECTOMY      Description: Total Abdominal Hysterectomy;  Proc Date: 2005;  Comments: for uterine fibroids and adhesions; ovaries are gone, too      No Known Allergies   Social History     Tobacco Use    Smoking status: Former    Smokeless tobacco: Never   Substance Use Topics    Alcohol use: Yes     Comment: Alcoholic Drinks/day: occasional       Wt Readings from Last 1 Encounters:   23 89.2 kg (196 lb 11.2 oz)           Physical Exam    Airway        Mallampati: II   TM distance: > 3 FB   Neck ROM: full   Mouth opening: > 3 cm    Respiratory Devices and Support         Dental       (+) Minor Abnormalities - some fillings, tiny chips and Removable bridges or other hardware      Cardiovascular   cardiovascular exam normal          Pulmonary   pulmonary exam normal                OUTSIDE LABS:  CBC:   Lab Results   Component Value Date    WBC 6.3 2023    WBC 5.9 10/18/2023    HGB 12.6 2023    HGB 12.6 2023    HCT 39.3 2023    HCT 38.4 10/18/2023     2023     10/18/2023     BMP:  "  Lab Results   Component Value Date     10/18/2023     08/23/2022    POTASSIUM 4.2 10/18/2023    POTASSIUM 4.5 08/23/2022    CHLORIDE 103 10/18/2023    CHLORIDE 105 08/23/2022    CO2 25 10/18/2023    CO2 24 08/23/2022    BUN 16.1 10/18/2023    BUN 14.9 08/23/2022    CR 0.79 12/08/2023    CR 0.76 10/18/2023    GLC 92 10/18/2023     (H) 08/23/2022     COAGS: No results found for: \"PTT\", \"INR\", \"FIBR\"  POC: No results found for: \"BGM\", \"HCG\", \"HCGS\"  HEPATIC:   Lab Results   Component Value Date    ALBUMIN 4.3 12/08/2023    PROTTOTAL 7.0 10/18/2023    ALT 31 12/08/2023    AST 34 10/18/2023    ALKPHOS 60 10/18/2023    BILITOTAL 0.3 10/18/2023     OTHER:   Lab Results   Component Value Date    NEAL 9.6 10/18/2023    TSH 1.34 06/23/2021       Anesthesia Plan    ASA Status:  2    NPO Status:  NPO Appropriate    Anesthesia Type: MAC.     - Reason for MAC: straight local not clinically adequate   Induction: Intravenous, Propofol.   Maintenance: TIVA.        Consents    Anesthesia Plan(s) and associated risks, benefits, and realistic alternatives discussed. Questions answered and patient/representative(s) expressed understanding.     - Discussed:     - Discussed with:  Patient      - Extended Intubation/Ventilatory Support Discussed: No.      - Patient is DNR/DNI Status: No     Use of blood products discussed: No .     Postoperative Care    Pain management: IV analgesics, Oral pain medications, Multi-modal analgesia.   PONV prophylaxis: Dexamethasone or Solumedrol, Ondansetron (or other 5HT-3), Background Propofol Infusion     Comments:           H&P reviewed: Unable to attach H&P to encounter due to EHR limitations. H&P Update: appropriate H&P reviewed, patient examined. No interval changes since H&P (within 30 days).        Meliton Brenner MD    I have reviewed the pertinent notes and labs in the chart from the past 30 days and (re)examined the patient.  Any updates or changes from those notes are reflected " "in this note.              # Obesity: Estimated body mass index is 30.19 kg/m  as calculated from the following:    Height as of 12/4/23: 1.719 m (5' 7.68\").    Weight as of 12/11/23: 89.2 kg (196 lb 11.2 oz).      "

## 2023-12-20 ENCOUNTER — ANESTHESIA (OUTPATIENT)
Dept: SURGERY | Facility: AMBULATORY SURGERY CENTER | Age: 68
End: 2023-12-20
Payer: COMMERCIAL

## 2023-12-20 ENCOUNTER — HOSPITAL ENCOUNTER (OUTPATIENT)
Facility: AMBULATORY SURGERY CENTER | Age: 68
Discharge: HOME | End: 2023-12-20
Attending: STUDENT IN AN ORGANIZED HEALTH CARE EDUCATION/TRAINING PROGRAM | Admitting: STUDENT IN AN ORGANIZED HEALTH CARE EDUCATION/TRAINING PROGRAM
Payer: COMMERCIAL

## 2023-12-20 VITALS
SYSTOLIC BLOOD PRESSURE: 132 MMHG | RESPIRATION RATE: 16 BRPM | WEIGHT: 196.7 LBS | BODY MASS INDEX: 30.19 KG/M2 | DIASTOLIC BLOOD PRESSURE: 74 MMHG | OXYGEN SATURATION: 97 % | TEMPERATURE: 97.2 F

## 2023-12-20 PROCEDURE — G8918 PT W/O PREOP ORDER IV AB PRO: HCPCS

## 2023-12-20 PROCEDURE — 64721 CARPAL TUNNEL SURGERY: CPT | Mod: RT

## 2023-12-20 PROCEDURE — G8907 PT DOC NO EVENTS ON DISCHARG: HCPCS

## 2023-12-20 RX ORDER — ACETAMINOPHEN 325 MG/1
975 TABLET ORAL ONCE
Status: COMPLETED | OUTPATIENT
Start: 2023-12-20 | End: 2023-12-20

## 2023-12-20 RX ORDER — LIDOCAINE 40 MG/G
CREAM TOPICAL
Status: DISCONTINUED | OUTPATIENT
Start: 2023-12-20 | End: 2023-12-21 | Stop reason: HOSPADM

## 2023-12-20 RX ORDER — PROPOFOL 10 MG/ML
INJECTION, EMULSION INTRAVENOUS CONTINUOUS PRN
Status: DISCONTINUED | OUTPATIENT
Start: 2023-12-20 | End: 2023-12-20

## 2023-12-20 RX ORDER — BACITRACIN ZINC 500 [USP'U]/G
OINTMENT TOPICAL PRN
Status: DISCONTINUED | OUTPATIENT
Start: 2023-12-20 | End: 2023-12-20 | Stop reason: HOSPADM

## 2023-12-20 RX ORDER — LIDOCAINE HYDROCHLORIDE 20 MG/ML
INJECTION, SOLUTION INFILTRATION; PERINEURAL PRN
Status: DISCONTINUED | OUTPATIENT
Start: 2023-12-20 | End: 2023-12-20

## 2023-12-20 RX ORDER — SODIUM CHLORIDE, SODIUM LACTATE, POTASSIUM CHLORIDE, CALCIUM CHLORIDE 600; 310; 30; 20 MG/100ML; MG/100ML; MG/100ML; MG/100ML
INJECTION, SOLUTION INTRAVENOUS CONTINUOUS
Status: DISCONTINUED | OUTPATIENT
Start: 2023-12-20 | End: 2023-12-21 | Stop reason: HOSPADM

## 2023-12-20 RX ORDER — PROPOFOL 10 MG/ML
INJECTION, EMULSION INTRAVENOUS PRN
Status: DISCONTINUED | OUTPATIENT
Start: 2023-12-20 | End: 2023-12-20

## 2023-12-20 RX ADMIN — LIDOCAINE HYDROCHLORIDE 20 MG: 20 INJECTION, SOLUTION INFILTRATION; PERINEURAL at 09:00

## 2023-12-20 RX ADMIN — SODIUM CHLORIDE, SODIUM LACTATE, POTASSIUM CHLORIDE, CALCIUM CHLORIDE: 600; 310; 30; 20 INJECTION, SOLUTION INTRAVENOUS at 08:54

## 2023-12-20 RX ADMIN — PROPOFOL 100 MCG/KG/MIN: 10 INJECTION, EMULSION INTRAVENOUS at 09:00

## 2023-12-20 RX ADMIN — PROPOFOL 60 MG: 10 INJECTION, EMULSION INTRAVENOUS at 09:00

## 2023-12-20 RX ADMIN — ACETAMINOPHEN 975 MG: 325 TABLET ORAL at 08:37

## 2023-12-20 NOTE — ANESTHESIA POSTPROCEDURE EVALUATION
Patient: Elza Polk    Procedure: Procedure(s):  RIGHT open carpal tunnel release       Anesthesia Type:  MAC    Note:  Disposition: Outpatient   Postop Pain Control: Uneventful            Sign Out: Well controlled pain   PONV: No   Neuro/Psych: Uneventful            Sign Out: Acceptable/Baseline neuro status   Airway/Respiratory: Uneventful            Sign Out: Acceptable/Baseline resp. status   CV/Hemodynamics: Uneventful            Sign Out: Acceptable CV status; No obvious hypovolemia; No obvious fluid overload   Other NRE:    DID A NON-ROUTINE EVENT OCCUR?            Last vitals:  Vitals Value Taken Time   BP     Temp     Pulse     Resp     SpO2         Electronically Signed By: Meliton Brenner MD  December 20, 2023  9:48 AM

## 2023-12-20 NOTE — DISCHARGE INSTRUCTIONS
Hand Surgery Discharge Instructions    Patient has been treated for right carpal tunnel syndrome with Dr. Ernandez on 12/20/2023.     Care: Please keep the splint/cast/dressing clean and dry at all times. Should it get wet, please call the clinic to schedule an appointment - as it may need to be replaced. Do not hesitate to use the sling to help protect the affected extremity and in particular in the setting of a nerve block.     Medications: You can take Ibuprofen 400-800 mg and Tylenol 650 mg for pain relief. Please take each every 6 hours, and for optimal pain relief - please stagger the medications so that you are taking one or the other every 3 hours. If you had a nerve block, the effects may last 8-12 hours. If you have been prescribed additional pain medications, please take as instructed and as needed. If you are taking additional pain medications, please do not exceed 4000 mg of Tylenol daily from all sources. Also, if you are taking narcotic medications, please do not operate heavy machinery or drive. If you have been prescribed antibiotics, please also take as instructed.     Diet: Start with clear liquids and slow down if nauseated. Advance the diet as tolerated.    Weight-bearing/Activities: Move your fingers to prevent stiffness. Elevate the affected extremity to improve throbbing sensation or pain. No strenuous activities and do not raise your heart rate above 100 bpm for the first few weeks. Limit your weight-bearing with the affected extremity to 3-5 pounds unless otherwise specified.    ER Instructions: Please call the office and/or consider return to the ER if you experience worsening pain not relieved by medications, increased swelling, redness or high fevers >101F or if there are unexpected problems like shortness of breath.    Post-op follow-up: Clinic in 10-14 days with Dr. Ernandez at the [facility] or [facility]. Please call our  Ortho triage line if you have any questions or  concerns before your clinic visit: [phone].    Chiki Ernandez MD, PhD       You were given 975mg Tylenol at 8:40am. You may take more Tylenol after 2:40pm. Do not take more than 4000mg of Tylenol in a 24 hour period.

## 2023-12-20 NOTE — OP NOTE
HAND SURGERY OPERATIVE REPORT     Date of Surgery: 12/20/2023  Surgical Service: Ortho Hand     Preoperative Diagnosis: Right carpal tunnel syndrome     Postoperative Diagnosis: Right carpal tunnel syndrome     Procedures Performed: Right open carpal tunnel release     Attending:  GHANSHYAM Ernandez  Assistant: None     Complications: None apparent  Specimens: None  Implants: None  Estimated blood loss: < 5 mL  Tourniquet time: 7 minutes  Wound classification: Clean  Anesthesia: MAC with local (1% lidocaine with epinephrine mixed 1:1 with 0.25% bupivicaine plain)     Indications for Procedure: Patient is a 68 year-old right-handed non-smoker with long-standing history of now nerve conduction study-confirmed right carpal tunnel syndrome. She has tried splinting and conservative management for years. We discussed the options for continued conservative management versus surgery. After discussing the risks of the surgery, including but not limited to: infection, bleeding, pain, scarring, incomplete release, need for revision, injury to nerves, neuroma formation, complex regional pain syndrome. After discussing risks, benefits and alternatives, patient elects to proceed with righ open carpal tunnel release.     Intraoperative findings: The right transverse carpal ligament (TCL) was fully released with the median nerve visualized and protected throughout the case. The distal deep palmar fat pad was visualized on the distal release. The proximal extent of the carpal tunnel was decompressed including division of the distal antebrachial fascia under direct visualization. The TCL was thickened.      Description of Procedure: Patient was seen in the preoperative holding area.  Consent was verified.  The right palm was marked.  All additional questions were answered.  The risks of the surgery were reiterated, including (but not limited to): infection, bleeding, scarring, pain, injury to surrounding structures including nerves and  tendons, incomplete release, need for additional revision surgery, neuroma formation, complex regional pain syndrome, stiffness. Following discussion of all these risks, the patient again agreed to proceed with surgery.  Patient was then transferred to the operating room and placed supine on the operating table.  All pressure points were padded.  Sequential compression devices were placed on bilateral lower extremities and verified to be operational.  Preinduction timeout was performed.  Monitored anesthesia care was commenced.  The planned longitudinally-oriented incision was marked and infiltrated in the subdermis with 1% lidocaine containing epinephrine. Once done, the right upper extremity was prepped and draped in usual sterile fashion. An Esmarch was used to exsanguinate the extremity and the tourniquet was inflated on the forearm to 250 mm Hg. Next, the longitudinally-oriented incision marked 2 mm from the distal-most wrist crease to Maynard's cardinal line, along the imaginary line along the radial side of the fourth ray, was made with a #15 blade. Once through skin, the palmar fascia was identified and longitudinally divided. A self-retaining retractor was place and the transverse carpal ligament fibers were identified. Of note, I identified the ulnar-most aspect of the ligament and there was no identified trans-ligamentous recurrent motor branch seen. Next, the TCL was divided longitudinally under direct visualization. It was further divided until the distal-most extent using a #15 blade. Once the deep palmar fat was seen, a Ronen's tenotomy was used to spread to ensure there was no incomplete release distally. Next, the proximal TCL as well as the distal antebrachial fascia was divided under direct visualization with the wrist slight flexed and the nerve protected. Next, my index finger swept under the leaflets of the TCL to ensure once more that it was fully released. Next, the tourniquet was deflated.  "Any bleeding from the fat was bipolar cauterized. The skin was then closed with 4-0 Nylon suture in interrupted simple and horizontal mattress fashion. The incision was dressed with bacitracin and Xeroform, 4 x 4\" gauze, Webril and an ACE bandage. Patient was woken up and transferred to the postanesthesia recovery area without any events and no pain.      Postoperative plan: Patient will be seen in the clinic in approximately 10-14 days for suture removal.      Chiki Ernandez MD, PhD   "

## 2023-12-20 NOTE — PROGRESS NOTES
Ortho Hand    Medically optimized. NO changes in history or exam.    OR today for RIGHT open carpal tunnel release.    Discussed the risks of surgery, including but not limited to: infection, bleeding, pain, poor scarring, injury to the nerves, neuroma formation, injurt to tendons, incomplete release, recurrence of carpal tunnel syndrome, need for revision surgery, complex regional pain syndrome, anesthesia-related complication. Despite these risks, patient consents to and would like to proceed with right open carpal tunnel release.  All questions answered.     Chiki Ernandez MD, PhD

## 2023-12-20 NOTE — ANESTHESIA CARE TRANSFER NOTE
Patient: Elza Polk    Procedure: Procedure(s):  RIGHT open carpal tunnel release       Diagnosis: Carpal tunnel syndrome of right wrist [G56.01]  Diagnosis Additional Information: No value filed.    Anesthesia Type:   MAC     Note:    Oropharynx: oropharynx clear of all foreign objects and spontaneously breathing  Level of Consciousness: awake and drowsy  Oxygen Supplementation: room air    Independent Airway: airway patency satisfactory and stable  Dentition: dentition unchanged  Vital Signs Stable: post-procedure vital signs reviewed and stable  Report to RN Given: handoff report given  Patient transferred to: Phase II    Handoff Report: Identifed the Patient, Identified the Reponsible Provider, Reviewed the pertinent medical history, Discussed the surgical course, Reviewed Intra-OP anesthesia mangement and issues during anesthesia, Set expectations for post-procedure period and Allowed opportunity for questions and acknowledgement of understanding    Vitals:  Vitals Value Taken Time   BP     Temp     Pulse     Resp     SpO2         Electronically Signed By: STACEY Siegel CRNA  December 20, 2023  9:41 AM

## 2024-01-02 ENCOUNTER — OFFICE VISIT (OUTPATIENT)
Dept: ORTHOPEDICS | Facility: CLINIC | Age: 69
End: 2024-01-02
Payer: COMMERCIAL

## 2024-01-02 DIAGNOSIS — G56.01 CARPAL TUNNEL SYNDROME OF RIGHT WRIST: Primary | ICD-10-CM

## 2024-01-02 PROCEDURE — 99024 POSTOP FOLLOW-UP VISIT: CPT | Performed by: STUDENT IN AN ORGANIZED HEALTH CARE EDUCATION/TRAINING PROGRAM

## 2024-01-02 NOTE — LETTER
1/2/2024         RE: Elza Polk  [address]  [place] [address]        Dear Colleague,    Thank you for referring your patient, Elza Polk, to the [facility] [place]. Please see a copy of my visit note below.    Ortho Hand    Doing well.  No issues.  Can finally sleep.  Patient very happy with result.    On exam, right palm incision is intact with no wounds or signs of infection.    A/P: 68-year-old female 2 weeks status post right open carpal tunnel release, doing well    -Sutures out today  -Steri-Strips placed  -Patient can initiate scar treatment with silicone-based ointment or Bio-oil next week.    -Limit lifting to 7-10 pounds for the next 2 weeks, followed by 15-20 pounds for postoperative weeks 5-6  -Instructed patient to use the fingers to prevent stiffness  -Counseled patient to understand that the baseline tingling and numbness will improve over the next several months  -Return to clinic in 4 weeks for reevaluation    Chiki Ernandez MD, PhD

## 2024-01-02 NOTE — NURSING NOTE
Reason For Visit:   Chief Complaint   Patient presents with    Surgical Followup     Post op Right open carpal release DOS: 12/20/23       Primary MD: Deyanira Meraz  Ref. MD: Adonis    Age: 68 year old    ?  No      LMP  (LMP Unknown)       Pain Assessment  Patient Currently in Pain: Yes  0-10 Pain Scale: 1  Primary Pain Location: Wrist (Right)  Pain Descriptors: Discomfort, Itching    Hand Dominance Evaluation  Hand Dominance: Right          QuickDASH Assessment      1/1/2024     9:53 AM   QuickDASH Main   1. Open a tight or new jar Mild difficulty   2. Do heavy household chores (e.g., wash walls, floors) Mild difficulty   3. Carry a shopping bag or briefcase No difficulty   4. Wash your back Mild difficulty   5. Use a knife to cut food Mild difficulty   6. Recreational activities in which you take some force or impact through your arm, shoulder or hand (e.g., golf, hammering, tennis, etc.) Mild difficulty   7. During the past week, to what extent has your arm, shoulder or hand problem interfered with your normal social activities with family, friends, neighbours or groups Slightly   8. During the past week, were you limited in your work or other regular daily activities as a result of your arm, shoulder or hand problem Slightly limited   9. Arm, shoulder or hand pain Mild   10.Tingling (pins and needles) in your arm,shoulder or hand Mild   11. During the past week, how much difficulty have you had sleeping because of the pain in your arm, shoulder or hand No difficulty   Quickdash Ability Score 20.45          Current Outpatient Medications   Medication Sig Dispense Refill    anastrozole (ARIMIDEX) 1 mg tablet [ANASTROZOLE (ARIMIDEX) 1 MG TABLET] Take 1 mg by mouth daily.      CALCIUM CARBONATE/VITAMIN D3 (CALCIUM+D ORAL) [CALCIUM CARBONATE/VITAMIN D3 (CALCIUM+D ORAL)] Take 1 tablet by mouth daily.      hydroxychloroquine (PLAQUENIL) 200 MG tablet Take 1 tablet (200 mg) by mouth 2 times  daily 180 tablet 1    multivitamin (ONE A DAY) per tablet [MULTIVITAMIN (ONE A DAY) PER TABLET] Take 1 tablet by mouth daily.         No Known Allergies    BOB ZAMORA, ATC

## 2024-01-02 NOTE — PROGRESS NOTES
Ortho Hand    Doing well.  No issues.  Can finally sleep.  Patient very happy with result.    On exam, right palm incision is intact with no wounds or signs of infection.    A/P: 68-year-old female 2 weeks status post right open carpal tunnel release, doing well    -Sutures out today  -Steri-Strips placed  -Patient can initiate scar treatment with silicone-based ointment or Bio-oil next week.    -Limit lifting to 7-10 pounds for the next 2 weeks, followed by 15-20 pounds for postoperative weeks 5-6  -Instructed patient to use the fingers to prevent stiffness  -Counseled patient to understand that the baseline tingling and numbness will improve over the next several months  -Return to clinic in 4 weeks for reevaluation    Chiki Ernandez MD, PhD

## 2024-01-10 ENCOUNTER — TRANSFERRED RECORDS (OUTPATIENT)
Dept: HEALTH INFORMATION MANAGEMENT | Facility: CLINIC | Age: 69
End: 2024-01-10
Payer: COMMERCIAL

## 2024-01-10 ENCOUNTER — MEDICAL CORRESPONDENCE (OUTPATIENT)
Dept: SCHEDULING | Facility: CLINIC | Age: 69
End: 2024-01-10
Payer: COMMERCIAL

## 2024-05-30 ENCOUNTER — TRANSFERRED RECORDS (OUTPATIENT)
Dept: HEALTH INFORMATION MANAGEMENT | Facility: CLINIC | Age: 69
End: 2024-05-30
Payer: COMMERCIAL

## 2024-06-14 ENCOUNTER — TELEPHONE (OUTPATIENT)
Dept: RHEUMATOLOGY | Facility: CLINIC | Age: 69
End: 2024-06-14
Payer: COMMERCIAL

## 2024-06-14 DIAGNOSIS — R76.8 CYCLIC CITRULLINATED PEPTIDE (CCP) ANTIBODY POSITIVE: ICD-10-CM

## 2024-06-14 DIAGNOSIS — M05.79 SEROPOSITIVE RHEUMATOID ARTHRITIS OF MULTIPLE SITES (H): ICD-10-CM

## 2024-06-17 ENCOUNTER — LAB (OUTPATIENT)
Dept: LAB | Facility: CLINIC | Age: 69
End: 2024-06-17
Payer: COMMERCIAL

## 2024-06-17 DIAGNOSIS — M05.79 SEROPOSITIVE RHEUMATOID ARTHRITIS OF MULTIPLE SITES (H): ICD-10-CM

## 2024-06-17 LAB
ALBUMIN SERPL BCG-MCNC: 4.4 G/DL (ref 3.5–5.2)
ALT SERPL W P-5'-P-CCNC: 19 U/L (ref 0–50)
CREAT SERPL-MCNC: 0.81 MG/DL (ref 0.51–0.95)
EGFRCR SERPLBLD CKD-EPI 2021: 80 ML/MIN/1.73M2
ERYTHROCYTE [DISTWIDTH] IN BLOOD BY AUTOMATED COUNT: 11.8 % (ref 10–15)
HCT VFR BLD AUTO: 35.4 % (ref 35–47)
HGB BLD-MCNC: 11.5 G/DL (ref 11.7–15.7)
MCH RBC QN AUTO: 29.5 PG (ref 26.5–33)
MCHC RBC AUTO-ENTMCNC: 32.5 G/DL (ref 31.5–36.5)
MCV RBC AUTO: 91 FL (ref 78–100)
PLATELET # BLD AUTO: 271 10E3/UL (ref 150–450)
RBC # BLD AUTO: 3.9 10E6/UL (ref 3.8–5.2)
WBC # BLD AUTO: 5.9 10E3/UL (ref 4–11)

## 2024-06-17 PROCEDURE — 85027 COMPLETE CBC AUTOMATED: CPT

## 2024-06-17 PROCEDURE — 82565 ASSAY OF CREATININE: CPT

## 2024-06-17 PROCEDURE — 82040 ASSAY OF SERUM ALBUMIN: CPT

## 2024-06-17 PROCEDURE — 36415 COLL VENOUS BLD VENIPUNCTURE: CPT

## 2024-06-17 PROCEDURE — 84460 ALANINE AMINO (ALT) (SGPT): CPT

## 2024-06-18 RX ORDER — HYDROXYCHLOROQUINE SULFATE 200 MG/1
200 TABLET, FILM COATED ORAL 2 TIMES DAILY
Qty: 60 TABLET | Refills: 0 | Status: SHIPPED | OUTPATIENT
Start: 2024-06-18 | End: 2024-07-11

## 2024-06-18 NOTE — TELEPHONE ENCOUNTER
Patient had labs completed 6/17 and has an eye exam scheduled for 7/9. That was the soonest appointment available.

## 2024-07-09 ENCOUNTER — TRANSFERRED RECORDS (OUTPATIENT)
Dept: HEALTH INFORMATION MANAGEMENT | Facility: CLINIC | Age: 69
End: 2024-07-09
Payer: COMMERCIAL

## 2024-07-10 ENCOUNTER — ANCILLARY PROCEDURE (OUTPATIENT)
Dept: BONE DENSITY | Facility: CLINIC | Age: 69
End: 2024-07-10
Attending: INTERNAL MEDICINE
Payer: COMMERCIAL

## 2024-07-10 DIAGNOSIS — C50.212 MALIGNANT NEOPLASM OF UPPER-INNER QUADRANT OF LEFT FEMALE BREAST (H): ICD-10-CM

## 2024-07-10 DIAGNOSIS — Z79.811 LONG TERM CURRENT USE OF AROMATASE INHIBITOR: ICD-10-CM

## 2024-07-10 PROCEDURE — 77080 DXA BONE DENSITY AXIAL: CPT | Mod: TC | Performed by: RADIOLOGY

## 2024-07-10 PROCEDURE — 77081 DXA BONE DENSITY APPENDICULR: CPT | Mod: TC | Performed by: RADIOLOGY

## 2024-07-11 RX ORDER — HYDROXYCHLOROQUINE SULFATE 200 MG/1
200 TABLET, FILM COATED ORAL 2 TIMES DAILY
Qty: 180 TABLET | Refills: 0 | Status: SHIPPED | OUTPATIENT
Start: 2024-07-11

## 2024-07-31 ENCOUNTER — TRANSFERRED RECORDS (OUTPATIENT)
Dept: HEALTH INFORMATION MANAGEMENT | Facility: CLINIC | Age: 69
End: 2024-07-31
Payer: COMMERCIAL

## 2024-09-18 ENCOUNTER — PATIENT OUTREACH (OUTPATIENT)
Dept: CARE COORDINATION | Facility: CLINIC | Age: 69
End: 2024-09-18
Payer: COMMERCIAL

## 2024-10-08 ENCOUNTER — PATIENT OUTREACH (OUTPATIENT)
Dept: CARE COORDINATION | Facility: CLINIC | Age: 69
End: 2024-10-08
Payer: COMMERCIAL

## 2024-10-18 SDOH — HEALTH STABILITY: PHYSICAL HEALTH: ON AVERAGE, HOW MANY DAYS PER WEEK DO YOU ENGAGE IN MODERATE TO STRENUOUS EXERCISE (LIKE A BRISK WALK)?: 4 DAYS

## 2024-10-18 SDOH — HEALTH STABILITY: PHYSICAL HEALTH: ON AVERAGE, HOW MANY MINUTES DO YOU ENGAGE IN EXERCISE AT THIS LEVEL?: 60 MIN

## 2024-10-18 ASSESSMENT — SOCIAL DETERMINANTS OF HEALTH (SDOH): HOW OFTEN DO YOU GET TOGETHER WITH FRIENDS OR RELATIVES?: MORE THAN THREE TIMES A WEEK

## 2024-10-21 ENCOUNTER — OFFICE VISIT (OUTPATIENT)
Dept: FAMILY MEDICINE | Facility: CLINIC | Age: 69
End: 2024-10-21
Payer: COMMERCIAL

## 2024-10-21 VITALS
OXYGEN SATURATION: 98 % | WEIGHT: 193 LBS | DIASTOLIC BLOOD PRESSURE: 72 MMHG | SYSTOLIC BLOOD PRESSURE: 116 MMHG | HEART RATE: 69 BPM | BODY MASS INDEX: 29.25 KG/M2 | HEIGHT: 68 IN | RESPIRATION RATE: 20 BRPM | TEMPERATURE: 98.1 F

## 2024-10-21 DIAGNOSIS — Z17.0 MALIGNANT NEOPLASM OF CENTRAL PORTION OF LEFT BREAST IN FEMALE, ESTROGEN RECEPTOR POSITIVE (H): ICD-10-CM

## 2024-10-21 DIAGNOSIS — C50.112 MALIGNANT NEOPLASM OF CENTRAL PORTION OF LEFT BREAST IN FEMALE, ESTROGEN RECEPTOR POSITIVE (H): ICD-10-CM

## 2024-10-21 DIAGNOSIS — Z29.11 NEED FOR VACCINATION AGAINST RESPIRATORY SYNCYTIAL VIRUS: ICD-10-CM

## 2024-10-21 DIAGNOSIS — Z29.11 NEED FOR PROPHYLACTIC VACCINATION AND INOCULATION AGAINST RESPIRATORY SYNCYTIAL VIRUS (RSV): ICD-10-CM

## 2024-10-21 DIAGNOSIS — M05.79 SEROPOSITIVE RHEUMATOID ARTHRITIS OF MULTIPLE SITES (H): ICD-10-CM

## 2024-10-21 DIAGNOSIS — E55.9 VITAMIN D DEFICIENCY: ICD-10-CM

## 2024-10-21 DIAGNOSIS — Z00.00 ENCOUNTER FOR MEDICARE ANNUAL WELLNESS EXAM: Primary | ICD-10-CM

## 2024-10-21 DIAGNOSIS — L98.9 SKIN LESION: ICD-10-CM

## 2024-10-21 LAB
ALBUMIN SERPL BCG-MCNC: 4.3 G/DL (ref 3.5–5.2)
ALP SERPL-CCNC: 64 U/L (ref 40–150)
ALT SERPL W P-5'-P-CCNC: 34 U/L (ref 0–50)
ANION GAP SERPL CALCULATED.3IONS-SCNC: 9 MMOL/L (ref 7–15)
AST SERPL W P-5'-P-CCNC: 66 U/L (ref 0–45)
BILIRUB SERPL-MCNC: 0.5 MG/DL
BUN SERPL-MCNC: 17.2 MG/DL (ref 8–23)
CALCIUM SERPL-MCNC: 9.6 MG/DL (ref 8.8–10.4)
CHLORIDE SERPL-SCNC: 107 MMOL/L (ref 98–107)
CREAT SERPL-MCNC: 0.81 MG/DL (ref 0.51–0.95)
EGFRCR SERPLBLD CKD-EPI 2021: 79 ML/MIN/1.73M2
ERYTHROCYTE [DISTWIDTH] IN BLOOD BY AUTOMATED COUNT: 11.7 % (ref 10–15)
GLUCOSE SERPL-MCNC: 97 MG/DL (ref 70–99)
HCO3 SERPL-SCNC: 24 MMOL/L (ref 22–29)
HCT VFR BLD AUTO: 37.3 % (ref 35–47)
HGB BLD-MCNC: 12.3 G/DL (ref 11.7–15.7)
MCH RBC QN AUTO: 29.7 PG (ref 26.5–33)
MCHC RBC AUTO-ENTMCNC: 33 G/DL (ref 31.5–36.5)
MCV RBC AUTO: 90 FL (ref 78–100)
PLATELET # BLD AUTO: 291 10E3/UL (ref 150–450)
POTASSIUM SERPL-SCNC: 4.6 MMOL/L (ref 3.4–5.3)
PROT SERPL-MCNC: 7 G/DL (ref 6.4–8.3)
RBC # BLD AUTO: 4.14 10E6/UL (ref 3.8–5.2)
SODIUM SERPL-SCNC: 140 MMOL/L (ref 135–145)
VIT D+METAB SERPL-MCNC: 30 NG/ML (ref 20–50)
WBC # BLD AUTO: 5.5 10E3/UL (ref 4–11)

## 2024-10-21 PROCEDURE — G0439 PPPS, SUBSEQ VISIT: HCPCS | Performed by: FAMILY MEDICINE

## 2024-10-21 PROCEDURE — 82306 VITAMIN D 25 HYDROXY: CPT | Performed by: FAMILY MEDICINE

## 2024-10-21 PROCEDURE — 85027 COMPLETE CBC AUTOMATED: CPT | Performed by: FAMILY MEDICINE

## 2024-10-21 PROCEDURE — 36415 COLL VENOUS BLD VENIPUNCTURE: CPT | Performed by: FAMILY MEDICINE

## 2024-10-21 PROCEDURE — 80053 COMPREHEN METABOLIC PANEL: CPT | Performed by: FAMILY MEDICINE

## 2024-10-21 ASSESSMENT — PAIN SCALES - GENERAL: PAINLEVEL: NO PAIN (0)

## 2024-10-21 NOTE — PROGRESS NOTES
"Preventive Care Visit  [facility] [place]  LUIS JACKMAN MD, Family Medicine  Oct 21, 2024      1. Encounter for Medicare annual wellness exam (Primary)  This is a 68 yo female here for AWV     2. Seropositive rheumatoid arthritis of multiple sites (H)  Patient follows with rheumatology for her RA  - CBC with platelets; Future  - Comprehensive metabolic panel (BMP + Alb, Alk Phos, ALT, AST, Total. Bili, TP); Future  - CBC with platelets  - Comprehensive metabolic panel (BMP + Alb, Alk Phos, ALT, AST, Total. Bili, TP)    3. Malignant neoplasm of central portion of left breast in female, estrogen receptor positive (H)  H/o breast cancer - follows with oncology - stable, no current issues     4. Vitamin D deficiency  H/o low vitamin D - check levels -   - Vitamin D Deficiency; Future  - Vitamin D Deficiency    5. Need for prophylactic vaccination and inoculation against respiratory syncytial virus (RSV)  Due for RSV vaccine - see below     6. Skin lesion  Patient has a skin lesion - at edge of distal right nares - bleeds spontaneously - but due for skin check - referral placed   - Adult Dermatology  Referral; Future    7. Need for vaccination against respiratory syncytial virus  Due for RSV vaccine - discussed   - RSV vaccine, bivalent, ABRYSVO, injection; Inject 0.5 mLs into the muscle once for 1 dose. Pharmacist administered  Dispense: 0.5 mL; Refill: 0      Farhan Bertrand is a 69 year old, presenting for the following:  No chief complaint on file.        10/21/2024     7:53 AM   Additional Questions   Roomed by [facility] Care Directive  Patient does not have a Health Care Directive or Living Will: Discussed advance care planning with patient; information given to patient to review.    Still working 3 days/week -   Was camping with [employer] -     Busy with yard work   Goes to the gym - \"all the time\" - 3-4x/week  Water aerobics, swims, weights, yoga, free " "weights, machines  Gym is \"like eating\"  Gets at least 14967 steps/day    Got flu and COVID vaccines at [employer] - recently   Has mammogram on 24th     Has tiny bleeding lesion at tip of right nares            10/18/2024   General Health   How would you rate your overall physical health? Excellent   Feel stress (tense, anxious, or unable to sleep) Not at all            10/18/2024   Nutrition   Diet: Regular (no restrictions)            10/18/2024   Exercise   Days per week of moderate/strenous exercise 4 days   Average minutes spent exercising at this level 60 min            10/18/2024   Social Factors   Frequency of gathering with friends or relatives More than three times a week   Worry food won't last until get money to buy more No   Food not last or not have enough money for food? No   Do you have housing? (Housing is defined as stable permanent housing and does not include staying ouside in a car, in a tent, in an abandoned building, in an overnight shelter, or couch-surfing.) Yes   Are you worried about losing your housing? No   Lack of transportation? No   Unable to get utilities (heat,electricity)? No            10/20/2024   Fall Risk   Fallen 2 or more times in the past year? No    Trouble with walking or balance? No        Patient-reported          10/18/2024   Activities of Daily Living- Home Safety   Needs help with the following daily activites None of the above   Safety concerns in the home None of the above            10/18/2024   Dental   Dentist two times every year? Yes            10/18/2024   Hearing Screening   Hearing concerns? None of the above            10/18/2024   Driving Risk Screening   Patient/family members have concerns about driving No            10/18/2024   General Alertness/Fatigue Screening   Have you been more tired than usual lately? No            10/18/2024   Urinary Incontinence Screening   Bothered by leaking urine in past 6 months Yes            10/18/2024   TB Screening "   Were you born outside of the [place]? No            Today's PHQ-2 Score:       10/20/2024    10:53 AM   PHQ-2 (  [employer])   Q1: Little interest or pleasure in doing things 0    Q2: Feeling down, depressed or hopeless 0    PHQ-2 Score 0   Q1: Little interest or pleasure in doing things Not at all   Q2: Feeling down, depressed or hopeless Not at all   PHQ-2 Score 0       Patient-reported           10/18/2024   Substance Use   Alcohol more than 3/day or more than 7/wk No   Do you have a current opioid prescription? No   How severe/bad is pain from 1 to 10? 0/10 (No Pain)   Do you use any other substances recreationally? No        Social History     Tobacco Use    Smoking status: Former     Current packs/day: 0.00     Types: Cigarettes     Quit date:      Years since quittin.8    Smokeless tobacco: Never   Substance Use Topics    Alcohol use: Yes     Comment: Alcoholic Drinks/day: occasional     Drug use: Not Currently     Types: Marijuana           2023   LAST FHS-7 RESULTS   1st degree relative breast or ovarian cancer Yes   Any relative bilateral breast cancer Unknown   Any male have breast cancer Unknown   Any ONE woman have BOTH breast AND ovarian cancer Yes   Any woman with breast cancer before 50yrs Unknown   2 or more relatives with breast AND/OR ovarian cancer Yes   2 or more relatives with breast AND/OR bowel cancer Yes           Mammogram Screening - Annual screen due to history of breast cancer, carcinoma in situ, or hyperplasia      History of abnormal Pap smear: No - age 65 or older with adequate negative prior screening test results (3 consecutive negative cytology results, 2 consecutive negative cotesting results, or 2 consecutive negative HrHPV test results within 10 years, with the most recent test occurring within the recommended screening interval for the test used)        Latest Ref Rng & Units 2020     9:01 AM   PAP / HPV   PAP Negative for squamous intraepithelial lesion or  malignancy. Negative for squamous intraepithelial lesion or malignancy  Electronically signed by Shane Barker CT (ASCP) on 2020 at  2:09 PM      HPV 16 DNA NEG Negative    HPV 18 DNA NEG Negative    Other HR HPV NEG Negative      ASCVD Risk   The 10-year ASCVD risk score (Jennifer DEAN, et al., 2019) is: 5.5%    Values used to calculate the score:      Age: 69 years      Sex: Female      Is Non- : No      Diabetic: No      Tobacco smoker: No      Systolic Blood Pressure: 116 mmHg      Is BP treated: No      HDL Cholesterol: 63 mg/dL      Total Cholesterol: 201 mg/dL            Reviewed and updated as needed this visit by Provider                    Past Medical History:   Diagnosis Date    Cancer (H) 2020    H/O small bowel obstruction     Rheumatoid arthritis (H)      Past Surgical History:   Procedure Laterality Date    BREAST SURGERY      COLONOSCOPY  yes    HYSTERECTOMY  2008    OOPHORECTOMY  2008    ID MASTECTOMY, PARTIAL Left 2020    Procedure: Left Lumpectomy after Wire Loclaization; [place] Lymph Node Biopsy;  Surgeon: Анна Coffey MD;  Location: [facility];  Service: General    RELEASE CARPAL TUNNEL Right 2023    Procedure: RIGHT open carpal tunnel release;  Surgeon: Chiki Ernandez MD;  Location:  OR    US BIOPSY FINE NEEDLE ASPIRATION LYMPH NODE (BREAST) LEFT Left 2020     BREAST CORE BIOPSY LEFT Left 2020    US BREAST LOC W SENT NODE INJ LEFT Left 2020    [facility] FREEING BOWEL ADHESION,ENTEROLYSIS      Description: Laparoscopic Lysis Of Intestinal Adhesions;  Recorded: 2008;    [facility] TOTAL ABDOM HYSTERECTOMY      Description: Total Abdominal Hysterectomy;  Proc Date: 2005;  Comments: for uterine fibroids and adhesions; ovaries are gone, too     OB History    Para Term  AB Living   2 2 2 0 0 0   SAB IAB Ectopic Multiple Live Births   0 0 0 0 0      # Outcome Date GA Lbr  Carlos Alberto/2nd Weight Sex Type Anes PTL Lv   2 Term            1 Term              BP Readings from Last 3 Encounters:   10/21/24 116/72   12/20/23 132/74   12/11/23 130/82    Wt Readings from Last 3 Encounters:   10/21/24 87.5 kg (193 lb)   12/20/23 89.2 kg (196 lb 11.2 oz)   12/11/23 89.2 kg (196 lb 11.2 oz)                  Patient Active Problem List   Diagnosis    Osteoarthritis Of The Knee    Ulnar neuropathy    High risk medication use    Health care maintenance    Seropositive rheumatoid arthritis of multiple sites (H)    Cyclic citrullinated peptide (CCP) antibody positive    Menopause    S/P total hysterectomy    Trochanteric bursitis, left hip    Lumbar paraspinal muscle spasm    Malignant neoplasm of central portion of left breast in female, estrogen receptor positive (H)    Allergy to penicillin    Carpal tunnel syndrome of right wrist     Past Surgical History:   Procedure Laterality Date    BREAST SURGERY  2021    COLONOSCOPY  yes    HYSTERECTOMY  01/01/2008    OOPHORECTOMY  01/01/2008    [place] MASTECTOMY, PARTIAL Left 09/23/2020    Procedure: Left Lumpectomy after Wire Loclaization; [place] Lymph Node Biopsy;  Surgeon: Анна Coffey MD;  Location: [facility];  Service: General    RELEASE CARPAL TUNNEL Right 12/20/2023    Procedure: RIGHT open carpal tunnel release;  Surgeon: Chiki Ernandez MD;  Location:  OR    US BIOPSY FINE NEEDLE ASPIRATION LYMPH NODE (BREAST) LEFT Left 09/03/2020     BREAST CORE BIOPSY LEFT Left 09/03/2020    US BREAST LOC W SENT NODE INJ LEFT Left 09/23/2020    [facility] FREEING BOWEL ADHESION,ENTEROLYSIS      Description: Laparoscopic Lysis Of Intestinal Adhesions;  Recorded: 08/22/2008;    [facility] TOTAL ABDOM HYSTERECTOMY      Description: Total Abdominal Hysterectomy;  Proc Date: 01/01/2005;  Comments: for uterine fibroids and adhesions; ovaries are gone, too       Social History     Tobacco Use    Smoking status: Former     Current packs/day: 0.00     Types: Cigarettes      Quit date:      Years since quittin.8    Smokeless tobacco: Never   Substance Use Topics    Alcohol use: Yes     Comment: Alcoholic Drinks/day: occasional      Family History   Problem Relation Age of Onset    Prostate Cancer Father     Coronary Artery Disease Father     Hypertension Father     Hypertension Mother     Diabetes Mother     Cerebrovascular Disease Mother     Breast Cancer Cousin     Other Cancer Cousin     Anxiety Disorder Son     Anxiety Disorder Son     Mental Illness Sister          Current Outpatient Medications   Medication Sig Dispense Refill    anastrozole (ARIMIDEX) 1 mg tablet [ANASTROZOLE (ARIMIDEX) 1 MG TABLET] Take 1 mg by mouth daily.      CALCIUM CARBONATE/VITAMIN D3 (CALCIUM+D ORAL) [CALCIUM CARBONATE/VITAMIN D3 (CALCIUM+D ORAL)] Take 1 tablet by mouth daily.      multivitamin (ONE A DAY) per tablet [MULTIVITAMIN (ONE A DAY) PER TABLET] Take 1 tablet by mouth daily.      hydroxychloroquine (PLAQUENIL) 200 MG tablet TAKE 1 TABLET(200 MG) BY MOUTH TWICE DAILY 180 tablet 0     No Known Allergies  Recent Labs   Lab Test 10/21/24  0905 24  0747 23  0822 10/18/23  0758 22  0711 21  0919 21  0910 20  0858 20  1713 19  1622   LDL  --   --   --   --   --  119  --  98  --  87   HDL  --   --   --   --   --  63  --  82  --  85   TRIG  --   --   --   --   --  96  --  93  --  157*   ALT 34 19 31 37   < > 23 22 19   < >  --    CR 0.81 0.81 0.79 0.76   < > 0.78 0.77 0.80   < >  --    GFRESTIMATED 79 80 82 86   < > >60 >60 >60   < >  --    GFRESTBLACK  --   --   --   --   --  >60 >60 >60   < >  --    POTASSIUM 4.6  --   --  4.2   < > 4.3  --  4.6  --   --    TSH  --   --   --   --   --  1.34  --  1.32  --   --     < > = values in this interval not displayed.      Current providers sharing in care for this patient include:  Patient Care Team:  Deyanira Meraz MD as PCP - General (Family Medicine)  Deyanira Meraz MD as  Assigned PCP  Grace Pryor MBBS as Assigned Rheumatology Provider  Eamon Diaz MD as Assigned Musculoskeletal Provider  Chiki Ernandez MD as Assigned Surgical Provider    The following health maintenance items are reviewed in Epic and correct as of today:  Health Maintenance   Topic Date Due    LUNG CANCER SCREENING  Never done    RSV VACCINE (1 - Risk 60-74 years 1-dose series) Never done    COVID-19 Vaccine (8 - 2024-25 season) 09/01/2024    PAP FOLLOW-UP  08/31/2025    HPV FOLLOW-UP  08/31/2025    MEDICARE ANNUAL WELLNESS VISIT  10/21/2025    ANNUAL REVIEW OF HM ORDERS  10/21/2025    FALL RISK ASSESSMENT  10/21/2025    MAMMO SCREENING  10/25/2025    LIPID  06/23/2026    GLUCOSE  10/21/2027    ADVANCE CARE PLANNING  10/21/2029    COLORECTAL CANCER SCREENING  02/03/2030    DEXA  07/11/2039    HEPATITIS C SCREENING  Completed    PHQ-2 (once per calendar year)  Completed    INFLUENZA VACCINE  Completed    Pneumococcal Vaccine: 65+ Years  Completed    ZOSTER IMMUNIZATION  Completed    HPV IMMUNIZATION  Aged Out    MENINGITIS IMMUNIZATION  Aged Out    RSV MONOCLONAL ANTIBODY  Aged Out    PAP  Discontinued    DTAP/TDAP/TD IMMUNIZATION  Discontinued       Review of Systems   Constitutional:  Negative for chills and fever.   HENT:  Negative for ear pain and sore throat.    Eyes:  Negative for pain and visual disturbance.   Respiratory:  Negative for cough and shortness of breath.    Cardiovascular:  Negative for chest pain and palpitations.   Gastrointestinal:  Negative for abdominal pain and vomiting.   Genitourinary:  Negative for dysuria and hematuria.   Musculoskeletal:  Positive for arthralgias. Negative for back pain.   Skin:  Negative for color change and rash.        Skin lesion      Neurological:  Negative for seizures and syncope.   All other systems reviewed and are negative.         Objective    Exam  /72 (BP Location: Left arm, Patient Position: Sitting, Cuff Size: Adult Regular)   Pulse 69    "Temp 98.1  F (36.7  C) (Oral)   Resp 20   Ht 1.735 m (5' 8.31\")   Wt 87.5 kg (193 lb)   LMP  (LMP Unknown)   SpO2 98%   BMI 29.08 kg/m     Estimated body mass index is 29.08 kg/m  as calculated from the following:    Height as of this encounter: 1.735 m (5' 8.31\").    Weight as of this encounter: 87.5 kg (193 lb).    Physical Exam  Vitals reviewed.   Constitutional:       General: She is not in acute distress.     Appearance: Normal appearance.   HENT:      Head: Normocephalic.      Right Ear: Tympanic membrane, ear canal and external ear normal.      Left Ear: Tympanic membrane, ear canal and external ear normal.      Nose: Nose normal.      Mouth/Throat:      Mouth: Mucous membranes are moist.      Pharynx: No posterior oropharyngeal erythema.   Eyes:      Extraocular Movements: Extraocular movements intact.      Conjunctiva/sclera: Conjunctivae normal.      Pupils: Pupils are equal, round, and reactive to light.   Cardiovascular:      Rate and Rhythm: Normal rate and regular rhythm.      Pulses: Normal pulses.      Heart sounds: Normal heart sounds. No murmur heard.  Pulmonary:      Effort: Pulmonary effort is normal.      Breath sounds: Normal breath sounds.   Abdominal:      Palpations: Abdomen is soft. There is no mass.      Tenderness: There is no abdominal tenderness. There is no guarding or rebound.   Musculoskeletal:         General: No deformity. Normal range of motion.      Cervical back: Normal range of motion and neck supple.   Lymphadenopathy:      Cervical: No cervical adenopathy.   Skin:     General: Skin is warm and dry.      Findings: Lesion (small bleeding lesion at tip of right nares) present.   Neurological:      General: No focal deficit present.      Mental Status: She is alert.   Psychiatric:         Mood and Affect: Mood normal.         Behavior: Behavior normal.       Results for orders placed or performed in visit on 10/21/24   CBC with platelets     Status: Normal   Result Value " Ref Range    WBC Count 5.5 4.0 - 11.0 10e3/uL    RBC Count 4.14 3.80 - 5.20 10e6/uL    Hemoglobin 12.3 11.7 - 15.7 g/dL    Hematocrit 37.3 35.0 - 47.0 %    MCV 90 78 - 100 fL    MCH 29.7 26.5 - 33.0 pg    MCHC 33.0 31.5 - 36.5 g/dL    RDW 11.7 10.0 - 15.0 %    Platelet Count 291 150 - 450 10e3/uL   Comprehensive metabolic panel (BMP + Alb, Alk Phos, ALT, AST, Total. Bili, TP)     Status: Abnormal   Result Value Ref Range    Sodium 140 135 - 145 mmol/L    Potassium 4.6 3.4 - 5.3 mmol/L    Carbon Dioxide (CO2) 24 22 - 29 mmol/L    Anion Gap 9 7 - 15 mmol/L    Urea Nitrogen 17.2 8.0 - 23.0 mg/dL    Creatinine 0.81 0.51 - 0.95 mg/dL    GFR Estimate 79 >60 mL/min/1.73m2    Calcium 9.6 8.8 - 10.4 mg/dL    Chloride 107 98 - 107 mmol/L    Glucose 97 70 - 99 mg/dL    Alkaline Phosphatase 64 40 - 150 U/L    AST 66 (H) 0 - 45 U/L    ALT 34 0 - 50 U/L    Protein Total 7.0 6.4 - 8.3 g/dL    Albumin 4.3 3.5 - 5.2 g/dL    Bilirubin Total 0.5 <=1.2 mg/dL   Vitamin D Deficiency     Status: Normal   Result Value Ref Range    Vitamin D, Total (25-Hydroxy) 30 20 - 50 ng/mL    Narrative    Season, race, dietary intake, and treatment affect the concentration of 25-hydroxy-Vitamin D. Values may decrease during winter months and increase during summer months.    Vitamin D determination is routinely performed by an immunoassay specific for 25 hydroxyvitamin D3.  If an individual is on vitamin D2(ergocalciferol) supplementation, please specify 25 OH vitamin D2 and D3 level determination by LCMSMS test [id number].               10/21/2024   Mini Cog   Clock Draw Score 2 Normal   3 Item Recall 3 objects recalled   Mini Cog Total Score 5             Prior to immunization administration, verified patients identity using patient s name and date of birth. Please see Immunization Activity for additional information.     Screening Questionnaire for Adult Immunization    Are you sick today?   No   Do you have allergies to medications, food, a vaccine  component or latex?   No   Have you ever had a serious reaction after receiving a vaccination?   No   Do you have a long-term health problem with heart, lung, kidney, or metabolic disease (e.g., diabetes), asthma, a blood disorder, no spleen, complement component deficiency, a cochlear implant, or a spinal fluid leak?  Are you on long-term aspirin therapy?   No   Do you have cancer, leukemia, HIV/AIDS, or any other immune system problem?   No   Do you have a parent, brother, or sister with an immune system problem?   Yes   In the past 3 months, have you taken medications that affect  your immune system, such as prednisone, other steroids, or anticancer drugs; drugs for the treatment of rheumatoid arthritis, Crohn s disease, or psoriasis; or have you had radiation treatments?   Yes   Have you had a seizure, or a brain or other nervous system problem?   No   During the past year, have you received a transfusion of blood or blood    products, or been given immune (gamma) globulin or antiviral drug?   No   For women: Are you pregnant or is there a chance you could become       pregnant during the next month?   No   Have you received any vaccinations in the past 4 weeks?   Yes     Immunization questionnaire was positive for at least one answer.  Notified .      Patient instructed to remain in clinic for 15 minutes afterwards, and to report any adverse reactions.     Screening performed by Sailaja Garcia MA on 10/21/2024 at 8:02 AM.         Signed Electronically by: LUIS JACKMAN MD

## 2024-10-25 ENCOUNTER — ANCILLARY PROCEDURE (OUTPATIENT)
Dept: MAMMOGRAPHY | Facility: HOSPITAL | Age: 69
End: 2024-10-25
Payer: COMMERCIAL

## 2024-10-25 DIAGNOSIS — Z12.31 VISIT FOR SCREENING MAMMOGRAM: ICD-10-CM

## 2024-10-25 PROCEDURE — 77063 BREAST TOMOSYNTHESIS BI: CPT

## 2024-10-27 ASSESSMENT — ENCOUNTER SYMPTOMS
ABDOMINAL PAIN: 0
BACK PAIN: 0
EYE PAIN: 0
SORE THROAT: 0
DYSURIA: 0
HEMATURIA: 0
SEIZURES: 0
PALPITATIONS: 0
VOMITING: 0
ARTHRALGIAS: 1
COLOR CHANGE: 0
CHILLS: 0
FEVER: 0
ROS SKIN COMMENTS: SKIN LESION
COUGH: 0
SHORTNESS OF BREATH: 0

## 2024-12-04 ENCOUNTER — LAB (OUTPATIENT)
Dept: LAB | Facility: CLINIC | Age: 69
End: 2024-12-04
Payer: COMMERCIAL

## 2024-12-04 DIAGNOSIS — M05.79 SEROPOSITIVE RHEUMATOID ARTHRITIS OF MULTIPLE SITES (H): ICD-10-CM

## 2024-12-04 LAB
ALBUMIN SERPL BCG-MCNC: 4.1 G/DL (ref 3.5–5.2)
ALT SERPL W P-5'-P-CCNC: 17 U/L (ref 0–50)
CREAT SERPL-MCNC: 0.75 MG/DL (ref 0.51–0.95)
EGFRCR SERPLBLD CKD-EPI 2021: 87 ML/MIN/1.73M2
ERYTHROCYTE [DISTWIDTH] IN BLOOD BY AUTOMATED COUNT: 11.8 % (ref 10–15)
HCT VFR BLD AUTO: 38.1 % (ref 35–47)
HGB BLD-MCNC: 12.7 G/DL (ref 11.7–15.7)
MCH RBC QN AUTO: 29.7 PG (ref 26.5–33)
MCHC RBC AUTO-ENTMCNC: 33.3 G/DL (ref 31.5–36.5)
MCV RBC AUTO: 89 FL (ref 78–100)
PLATELET # BLD AUTO: 182 10E3/UL (ref 150–450)
RBC # BLD AUTO: 4.28 10E6/UL (ref 3.8–5.2)
WBC # BLD AUTO: 6.3 10E3/UL (ref 4–11)

## 2024-12-04 PROCEDURE — 36415 COLL VENOUS BLD VENIPUNCTURE: CPT

## 2024-12-04 PROCEDURE — 82565 ASSAY OF CREATININE: CPT

## 2024-12-04 PROCEDURE — 84460 ALANINE AMINO (ALT) (SGPT): CPT

## 2024-12-04 PROCEDURE — 82040 ASSAY OF SERUM ALBUMIN: CPT

## 2024-12-04 PROCEDURE — 85027 COMPLETE CBC AUTOMATED: CPT

## 2024-12-11 ENCOUNTER — OFFICE VISIT (OUTPATIENT)
Dept: RHEUMATOLOGY | Facility: CLINIC | Age: 69
End: 2024-12-11
Payer: COMMERCIAL

## 2024-12-11 VITALS
BODY MASS INDEX: 29.08 KG/M2 | OXYGEN SATURATION: 97 % | DIASTOLIC BLOOD PRESSURE: 72 MMHG | HEART RATE: 75 BPM | WEIGHT: 193 LBS | SYSTOLIC BLOOD PRESSURE: 124 MMHG

## 2024-12-11 DIAGNOSIS — M17.10 UNILATERAL PRIMARY OSTEOARTHRITIS, UNSPECIFIED KNEE: ICD-10-CM

## 2024-12-11 DIAGNOSIS — M05.79 SEROPOSITIVE RHEUMATOID ARTHRITIS OF MULTIPLE SITES (H): Primary | ICD-10-CM

## 2024-12-11 DIAGNOSIS — R76.8 CYCLIC CITRULLINATED PEPTIDE (CCP) ANTIBODY POSITIVE: ICD-10-CM

## 2024-12-11 DIAGNOSIS — Z79.899 HIGH RISK MEDICATION USE: ICD-10-CM

## 2024-12-11 PROCEDURE — 99214 OFFICE O/P EST MOD 30 MIN: CPT | Performed by: INTERNAL MEDICINE

## 2024-12-11 PROCEDURE — G2211 COMPLEX E/M VISIT ADD ON: HCPCS | Performed by: INTERNAL MEDICINE

## 2024-12-11 RX ORDER — HYDROXYCHLOROQUINE SULFATE 200 MG/1
200 TABLET, FILM COATED ORAL 2 TIMES DAILY
Qty: 180 TABLET | Refills: 3 | Status: SHIPPED | OUTPATIENT
Start: 2024-12-11 | End: 2025-03-11

## 2024-12-11 NOTE — PROGRESS NOTES
Rheumatology follow-up visit note     Elza is a 69 year old female presents today for follow-up.    Elza was seen today for recheck.    Diagnoses and all orders for this visit:    Seropositive rheumatoid arthritis of multiple sites (H)  -     hydroxychloroquine (PLAQUENIL) 200 MG tablet; Take 1 tablet (200 mg) by mouth 2 times daily.    Cyclic citrullinated peptide (CCP) antibody positive  -     hydroxychloroquine (PLAQUENIL) 200 MG tablet; Take 1 tablet (200 mg) by mouth 2 times daily.    High risk medication use    Osteoarthritis Of The Knee        The longitudinal plan of care for the diagnosis(es)/condition(s) as documented were addressed during this visit. Due to the added complexity in care, I will continue to support Elza in the subsequent management and with ongoing continuity of care.      Follow up in 1 year.    HPI    Elza Polk is a 69 year old female is here for follow-up of   rheumatoid arthritis.  This is seropositive.  Polyarticular longstanding.  At 1 point she had tried to be off all medications including hydroxychloroquine and had a relapse of her symptoms.  She has tolerated restarting the hydroxychloroquine very well.  No GI symptoms cutaneous issues.  Her eye examination was in summer 2024 was reassured.  Recent labs are entirely normal.  She is taking up cross-country skiing.  She is no longer having any further palindrome's of rheumatoid arthritis.  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  She exercises both aerobic and strength training regularly.  She volunteers, meditates, overall doing great.           DETAILED EXAMINATION  12/11/24  :    Vitals:    12/11/24 1003   BP: 124/72   BP Location: Right arm   Pulse: 75   SpO2: 97%   Weight: 87.5 kg (193 lb)     Alert oriented. Head including the face is examined for malar rash, heliotropes, scarring, lupus pernio. Eyes examined for redness such as in episcleritis/scleritis,  periorbital lesions.   Neck/ Face examined for parotid gland swelling, range of motion of neck.  Left upper and lower and right upper and lower extremities examined for tenderness, swelling, warmth of the appendicular joints, range of motion, edema, rash.  Some of the important findings included: she does not have evidence of synovitis in the palpable joints of the upper extremities.  No significant deformities of the digits.  no Heberden nodes.  Range of motion of the shoulders  show full abduction.  No JLT effusion or warmth of the knees.  she does not have dactylitis of the digits.  She has minimal triggering of the right ring finger without significant tenderness of the A1 nodularity.     Patient Active Problem List    Diagnosis Date Noted    Carpal tunnel syndrome of right wrist 10/10/2023     Priority: Medium    Allergy to penicillin 12/01/2020     Priority: Medium    Malignant neoplasm of central portion of left breast in female, estrogen receptor positive (H) 09/16/2020     Priority: Medium     Added automatically from request for surgery [id number]        Lumbar paraspinal muscle spasm 09/17/2018     Priority: Medium    Trochanteric bursitis, left hip 08/28/2018     Priority: Medium    Menopause 06/29/2018     Priority: Medium    S/P total hysterectomy 06/29/2018     Priority: Medium     Done for fibroid        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      Seropositive rheumatoid arthritis of multiple sites (H) 03/30/2016     Priority: Medium    Health care maintenance 02/04/2016     Priority: Medium    High risk medication use 10/05/2015     Priority: Medium    Ulnar neuropathy 06/15/2015     Priority: Medium     Past Surgical History:   Procedure Laterality  Date    BREAST SURGERY  2021    COLONOSCOPY  yes    HYSTERECTOMY  01/01/2008    OOPHORECTOMY  01/01/2008    MI MASTECTOMY, PARTIAL Left 09/23/2020    Procedure: Left Lumpectomy after Wire Loclaization; [place] Lymph Node Biopsy;  Surgeon: Анна Coffey MD;  Location: [facility];  Service: General    RELEASE CARPAL TUNNEL Right 12/20/2023    Procedure: RIGHT open carpal tunnel release;  Surgeon: Chiki Ernandez MD;  Location:  OR    US BIOPSY FINE NEEDLE ASPIRATION LYMPH NODE (BREAST) LEFT Left 09/03/2020    US BREAST CORE BIOPSY LEFT Left 09/03/2020    US BREAST LOC W SENT NODE INJ LEFT Left 09/23/2020    [facility] FREEING BOWEL ADHESION,ENTEROLYSIS      Description: Laparoscopic Lysis Of Intestinal Adhesions;  Recorded: 08/22/2008;    [facility] TOTAL ABDOM HYSTERECTOMY      Description: Total Abdominal Hysterectomy;  Proc Date: 01/01/2005;  Comments: for uterine fibroids and adhesions; ovaries are gone, too      Past Medical History:   Diagnosis Date    Cancer (H) 9/2020    H/O small bowel obstruction     Rheumatoid arthritis (H)      No Known Allergies  Current Outpatient Medications   Medication Sig Dispense Refill    anastrozole (ARIMIDEX) 1 mg tablet [ANASTROZOLE (ARIMIDEX) 1 MG TABLET] Take 1 mg by mouth daily.      CALCIUM CARBONATE/VITAMIN D3 (CALCIUM+D ORAL) [CALCIUM CARBONATE/VITAMIN D3 (CALCIUM+D ORAL)] Take 1 tablet by mouth daily.      hydroxychloroquine (PLAQUENIL) 200 MG tablet TAKE 1 TABLET(200 MG) BY MOUTH TWICE DAILY 180 tablet 0    multivitamin (ONE A DAY) per tablet [MULTIVITAMIN (ONE A DAY) PER TABLET] Take 1 tablet by mouth daily.       family history includes Anxiety Disorder in her son and son; Breast Cancer in her cousin; Cerebrovascular Disease in her mother; Coronary Artery Disease in her father; Diabetes in her mother; Hypertension in her father and mother; Mental Illness in her sister; Other Cancer in her cousin; Prostate Cancer in her father.  Social Connections: Unknown  (10/18/2024)    Social Connection and Isolation Panel [NHANES]     Frequency of Communication with Friends and Family: Not on file     Frequency of Social Gatherings with Friends and Family: More than three times a week     Attends Muslim Services: Not on file     Active Member of Clubs or Organizations: Not on file     Attends Club or Organization Meetings: Not on file     Marital Status: Not on file          WBC Count   Date Value Ref Range Status   12/04/2024 6.3 4.0 - 11.0 10e3/uL Final     RBC Count   Date Value Ref Range Status   12/04/2024 4.28 3.80 - 5.20 10e6/uL Final     Hemoglobin   Date Value Ref Range Status   12/04/2024 12.7 11.7 - 15.7 g/dL Final     Hematocrit   Date Value Ref Range Status   12/04/2024 38.1 35.0 - 47.0 % Final     MCV   Date Value Ref Range Status   12/04/2024 89 78 - 100 fL Final     MCH   Date Value Ref Range Status   12/04/2024 29.7 26.5 - 33.0 pg Final     Platelet Count   Date Value Ref Range Status   12/04/2024 182 150 - 450 10e3/uL Final     % Lymphocytes   Date Value Ref Range Status   08/31/2020 34 20 - 40 % Final     AST   Date Value Ref Range Status   10/21/2024 66 (H) 0 - 45 U/L Final     ALT   Date Value Ref Range Status   12/04/2024 17 0 - 50 U/L Final     Albumin   Date Value Ref Range Status   12/04/2024 4.1 3.5 - 5.2 g/dL Final   03/30/2022 3.8 3.5 - 5.0 g/dL Final     Alkaline Phosphatase   Date Value Ref Range Status   10/21/2024 64 40 - 150 U/L Final     Creatinine   Date Value Ref Range Status   12/04/2024 0.75 0.51 - 0.95 mg/dL Final     GFR Estimate   Date Value Ref Range Status   12/04/2024 87 >60 mL/min/1.73m2 Final     Comment:     eGFR calculated using 2021 CKD-EPI equation.   06/23/2021 >60 >60 mL/min/1.73m2 Final     GFR Estimate If Black   Date Value Ref Range Status   06/23/2021 >60 >60 mL/min/1.73m2 Final     Creatinine Urine mg/dL (External)   Date Value Ref Range Status   03/13/2018 240.4 mg/dL Final

## 2024-12-16 ENCOUNTER — TRANSFERRED RECORDS (OUTPATIENT)
Dept: HEALTH INFORMATION MANAGEMENT | Facility: CLINIC | Age: 69
End: 2024-12-16
Payer: COMMERCIAL

## 2025-01-29 ENCOUNTER — TRANSFERRED RECORDS (OUTPATIENT)
Dept: HEALTH INFORMATION MANAGEMENT | Facility: CLINIC | Age: 70
End: 2025-01-29
Payer: COMMERCIAL

## 2025-05-27 DIAGNOSIS — M05.79 SEROPOSITIVE RHEUMATOID ARTHRITIS OF MULTIPLE SITES (H): Primary | ICD-10-CM

## 2025-06-02 ENCOUNTER — TRANSFERRED RECORDS (OUTPATIENT)
Dept: HEALTH INFORMATION MANAGEMENT | Facility: CLINIC | Age: 70
End: 2025-06-02
Payer: COMMERCIAL

## 2025-06-09 ENCOUNTER — LAB (OUTPATIENT)
Dept: LAB | Facility: CLINIC | Age: 70
End: 2025-06-09
Payer: COMMERCIAL

## 2025-06-09 DIAGNOSIS — M05.79 SEROPOSITIVE RHEUMATOID ARTHRITIS OF MULTIPLE SITES (H): ICD-10-CM

## 2025-06-09 LAB
ALBUMIN SERPL BCG-MCNC: 4.2 G/DL (ref 3.5–5.2)
ALT SERPL W P-5'-P-CCNC: 25 U/L (ref 0–50)
CREAT SERPL-MCNC: 0.85 MG/DL (ref 0.51–0.95)
EGFRCR SERPLBLD CKD-EPI 2021: 75 ML/MIN/1.73M2
ERYTHROCYTE [DISTWIDTH] IN BLOOD BY AUTOMATED COUNT: 11.9 % (ref 10–15)
HCT VFR BLD AUTO: 37.6 % (ref 35–47)
HGB BLD-MCNC: 12.5 G/DL (ref 11.7–15.7)
MCH RBC QN AUTO: 30 PG (ref 26.5–33)
MCHC RBC AUTO-ENTMCNC: 33.2 G/DL (ref 31.5–36.5)
MCV RBC AUTO: 90 FL (ref 78–100)
PLATELET # BLD AUTO: 299 10E3/UL (ref 150–450)
RBC # BLD AUTO: 4.17 10E6/UL (ref 3.8–5.2)
WBC # BLD AUTO: 6.9 10E3/UL (ref 4–11)

## 2025-06-10 ENCOUNTER — RESULTS FOLLOW-UP (OUTPATIENT)
Dept: RHEUMATOLOGY | Facility: CLINIC | Age: 70
End: 2025-06-10

## 2025-07-21 ENCOUNTER — PATIENT OUTREACH (OUTPATIENT)
Dept: CARE COORDINATION | Facility: CLINIC | Age: 70
End: 2025-07-21
Payer: COMMERCIAL

## 2025-08-19 ENCOUNTER — TRANSFERRED RECORDS (OUTPATIENT)
Dept: HEALTH INFORMATION MANAGEMENT | Facility: CLINIC | Age: 70
End: 2025-08-19
Payer: COMMERCIAL

## (undated) DEVICE — GLOVE BIOGEL PI MICRO INDICATOR UNDERGLOVE SZ 7.0 48970

## (undated) DEVICE — DRAPE STERI TOWEL SM 1000

## (undated) DEVICE — SOL NACL 0.9% IRRIG 1000ML BOTTLE 07138-09

## (undated) DEVICE — SOL WATER IRRIG 1000ML BOTTLE 07139-09

## (undated) DEVICE — CAST PADDING 2" STERILE 9062S

## (undated) DEVICE — PACK HAND WRIST SOP15HWFSP

## (undated) DEVICE — ESU CORD BIPOLAR GREEN 10-4000

## (undated) DEVICE — GLOVE BIOGEL PI ULTRATOUCH G SZ 6.5 42165

## (undated) DEVICE — GLOVE BIOGEL PI MICRO SZ 7.5 48575

## (undated) DEVICE — PREP CHLORAPREP 26ML TINTED ORANGE  260815

## (undated) RX ORDER — ACETAMINOPHEN 325 MG/1
TABLET ORAL
Status: DISPENSED
Start: 2023-12-20

## (undated) RX ORDER — LIDOCAINE HYDROCHLORIDE 10 MG/ML
INJECTION, SOLUTION EPIDURAL; INFILTRATION; INTRACAUDAL; PERINEURAL
Status: DISPENSED
Start: 2023-03-17

## (undated) RX ORDER — TRIAMCINOLONE ACETONIDE 40 MG/ML
INJECTION, SUSPENSION INTRA-ARTICULAR; INTRAMUSCULAR
Status: DISPENSED
Start: 2023-03-17

## (undated) RX ORDER — BUPIVACAINE HYDROCHLORIDE 2.5 MG/ML
INJECTION, SOLUTION EPIDURAL; INFILTRATION; INTRACAUDAL
Status: DISPENSED
Start: 2023-03-17